# Patient Record
Sex: FEMALE | Race: WHITE | NOT HISPANIC OR LATINO | Employment: OTHER | URBAN - METROPOLITAN AREA
[De-identification: names, ages, dates, MRNs, and addresses within clinical notes are randomized per-mention and may not be internally consistent; named-entity substitution may affect disease eponyms.]

---

## 2017-01-12 ENCOUNTER — DOCUMENTATION ONLY (OUTPATIENT)
Dept: FAMILY MEDICINE | Facility: CLINIC | Age: 67
End: 2017-01-12

## 2017-01-12 NOTE — PROGRESS NOTES
"When opening a documentation only encounter, be sure to enter in \"Chief Complaint\" Forms and in \" Comments\" Title of form, description if needed.    Form received via: Fax  Form now resides in: Provider Ready    Form sent out via: Fax  Patient informed: No  Output date: January 12, 2017'    Form received by recipient via fax confirmation  Please close the encounter.    "

## 2017-06-28 ENCOUNTER — HOSPITAL ENCOUNTER (OUTPATIENT)
Dept: MAMMOGRAPHY | Facility: CLINIC | Age: 67
Discharge: HOME | End: 2017-06-28
Attending: FAMILY MEDICINE | Admitting: FAMILY MEDICINE
Payer: MEDICARE

## 2017-06-28 DIAGNOSIS — Z12.31 VISIT FOR SCREENING MAMMOGRAM: ICD-10-CM

## 2017-06-28 PROCEDURE — 77063 BREAST TOMOSYNTHESIS BI: CPT

## 2018-03-23 ENCOUNTER — TELEPHONE (OUTPATIENT)
Dept: FAMILY MEDICINE | Facility: CLINIC | Age: 68
End: 2018-03-23

## 2018-03-23 DIAGNOSIS — Z71.84 TRAVEL ADVICE ENCOUNTER: Primary | ICD-10-CM

## 2018-03-23 NOTE — TELEPHONE ENCOUNTER
[facility] phone call message- general phone call:    Reason for call: Patient called stating that her partner Jatinder Vaughn has been communicating with Dr. Menchaca regarding getting typhoid medication before they go out of the country. Patient just wants to make sure that Dr. Menchaca will be sending over medication for her as well since they will both be there. Please call back to discuss.    Return call needed: Yes    OK to leave a message on voice mail? Yes    Primary language: English      needed? No    Call taken on March 23, 2018 at 8:23 AM by Aleshia Beasley

## 2018-06-25 ENCOUNTER — HOSPITAL ENCOUNTER (OUTPATIENT)
Dept: MAMMOGRAPHY | Facility: CLINIC | Age: 68
Discharge: HOME | End: 2018-06-25
Attending: FAMILY MEDICINE | Admitting: FAMILY MEDICINE
Payer: MEDICARE

## 2018-06-25 DIAGNOSIS — Z12.31 VISIT FOR SCREENING MAMMOGRAM: ICD-10-CM

## 2018-06-25 PROCEDURE — 77063 BREAST TOMOSYNTHESIS BI: CPT

## 2018-11-28 ENCOUNTER — OFFICE VISIT (OUTPATIENT)
Dept: PHARMACY | Facility: CLINIC | Age: 68
End: 2018-11-28
Payer: MEDICARE

## 2018-11-28 ENCOUNTER — OFFICE VISIT (OUTPATIENT)
Dept: FAMILY MEDICINE | Facility: CLINIC | Age: 68
End: 2018-11-28
Payer: MEDICARE

## 2018-11-28 VITALS
HEART RATE: 62 BPM | OXYGEN SATURATION: 97 % | RESPIRATION RATE: 16 BRPM | BODY MASS INDEX: 23.51 KG/M2 | HEIGHT: 59 IN | TEMPERATURE: 98.6 F | SYSTOLIC BLOOD PRESSURE: 128 MMHG | DIASTOLIC BLOOD PRESSURE: 78 MMHG | WEIGHT: 116.6 LBS

## 2018-11-28 DIAGNOSIS — Z13.220 LIPID SCREENING: ICD-10-CM

## 2018-11-28 DIAGNOSIS — R20.0 NUMBNESS OF RIGHT HAND: ICD-10-CM

## 2018-11-28 DIAGNOSIS — M54.2 NECK PAIN: ICD-10-CM

## 2018-11-28 DIAGNOSIS — Z00.00 PREVENTATIVE HEALTH CARE: Primary | ICD-10-CM

## 2018-11-28 DIAGNOSIS — Z00.00 MEDICARE ANNUAL WELLNESS VISIT, SUBSEQUENT: Primary | ICD-10-CM

## 2018-11-28 DIAGNOSIS — Z11.59 NEED FOR HEPATITIS C SCREENING TEST: ICD-10-CM

## 2018-11-28 LAB
CHOLEST SERPL-MCNC: 185.2 MG/DL (ref 0–200)
CHOLEST/HDLC SERPL: 2 {RATIO} (ref 0–5)
HDLC SERPL-MCNC: 94.5 MG/DL
LDLC SERPL CALC-MCNC: 81 MG/DL (ref 0–129)
TRIGL SERPL-MCNC: 50.4 MG/DL (ref 0–150)
VLDL CHOLESTEROL: 10.1 MG/DL (ref 7–32)

## 2018-11-28 NOTE — PATIENT INSTRUCTIONS
[facility] - SERVICES     Review these tests with your doctor then decide which ones you want and take this page home for your reference  Immunization History   Administered Date(s) Administered     HEPA 05/12/1997, 12/01/1997     HepB 09/30/1997, 06/12/2006, 12/11/2006     Influenza (H1N1) 01/07/2010     Influenza (High Dose) 3 valent vaccine 10/11/2016, 09/30/2018     Influenza (IIV3) PF 11/14/1996, 11/07/1997, 10/17/2006, 11/06/2007, 11/19/2008, 09/29/2011, 09/17/2014     Influenza Vaccine IM 3yrs+ 4 Valent IIV4 10/21/2015     OPV, trivalent, live 08/05/1958, 06/22/1960, 07/27/1966     Small Pox 12/21/1966     TD (ADULT, 7+) 06/22/1960, 06/19/1963, 07/27/1966, 07/19/2002     TDAP Vaccine (Boostrix) 05/07/2013     Tetanus 01/01/1969     Typhoid Oral 07/11/2006, 07/11/2006, 07/13/2006, 07/15/2006, 07/17/2006     Yellow Fever 07/11/2006     Zoster vaccine, live 04/20/2010          IMMUNIZATIONS Description Recommend today?     Influenza (flu shot) Helps to prevent flu; you should get it every year Up to date   PCV 13 Prevents pneumonia; given once Recommeded and patient declined.    PPSV 23 Prevents pneumonia; given once Recommeded and patient declined.    Tetanus Prevents tetanus; need every 10 years No; is up to date.   Herpes Zoster (shingles) Prevents or lessens symptoms from shingles; given once Re[facility] to get at [facility]   Hepatitis B  If you have any of the following risk factors you should be immunized for hepatitis B: severe kidney disease, people who live in the same house as a carrier of Hepatitis B virus, people who live in  institutions (e.g. nursing homes or group homes), homosexual men, patients with hemophilia who received Factor VIII or IX concentrates, abusers of illicit injectable drugs No; is up to date.           Health Maintenance   Topic Date Due     HEPATITIS C SCREENING  06/23/1967     PNEUMOCOCCAL (1 of 2 - PCV13) 06/23/2014     LIPID SCREEN Q5  YR FEMALE (SYSTEM ASSIGNED)  02/28/2017     PHQ-2 Q1 YR  12/09/2017     ADVANCE DIRECTIVE PLANNING Q5 YRS  11/01/2018     FALL RISK ASSESSMENT  11/28/2019     MAMMO SCREEN Q2 YR (SYSTEM ASSIGNED)  06/25/2020     TETANUS IMMUNIZATION (SYSTEM ASSIGNED)  05/07/2023     COLON CANCER SCREEN (SYSTEM ASSIGNED)  12/02/2026     DEXA SCAN SCREENING (SYSTEM ASSIGNED)  Completed     INFLUENZA VACCINE  Completed       SCREENING TESTS     Description   Year of Last Screening   Recommended Today?   Heart disease screening blood tests    Cholesterol level Reducing cholesterol can reduce your risk of heart attacks by 25%.  Screening is recommended yearly if you are at risk of heart disease otherwise every 4-5 years  Yes; Recommended and ordered.   Diabetes screening tests    Hemoglobin A1c blood test   Finding and treating diabetes early can reduce complications.  Screening recommended/covered yearly if you have high blood pressure, high cholesterol, obesity (BMI >30), or a history of high blood glucose tests; or 2 of the following: family history of diabetes, overweight (BMI >25 but <30), age 65 years or older, and a history of diabetes of pregnancy or gave birth to baby weighing more than 9 lbs.  No; is up to date.   Hepatitis B screening Finding hepatitis B early can reduce complications.  Screening is recommended for persons with selected risk factors.  No: is not indicated today.   Hepatitis C screening Finding hepatitis C early can reduce complications.  Screening is recommended for all persons born from 1945 through 1965 and for those with selected other risk factors.   Yes; Recommended and ordered.   HIV screening Finding HIV early can reduce complications.  Screening is recommended for persons with risk factors for HIV infection.  No: is not indicated today.   Glaucoma screening Early detection of glaucoma can prevent blindness.   Please talk to your eye doctor about this.           SCREENING TESTS     Description   Year of  Last Screening   Recommended Today?   Colorectal cancer screening    Fecal occult blood test     Screening colonoscopy Screening for colon cancer has been shown to reduce death from colon cancer by 25-30%. Screening recommended to start at 50 years and continuing until age 75 years.    No; is up to date.   Breast Cancer Screening (women)    Mammogram Mammogram screening for breast cancer has been shown to reduce the risk of dying from breast cancer and prolong life. Screening is recommended every 1-2 years for women aged 50 to 74 years.  2018 No; is up to date.   Cervical Cancer screening (women)    Pap Cervical pap smears can reduce cervical cancer. Screening is recommended annually if high risk (history of abnormal pap smears) otherwise every 2-3 years, stop screening at 65 years of age if history of normal paps.  No; is up to date.   Screening for Osteoporosis:  Bone mass measurements (women)    Dexa Scan Screening and treating Osteoporosis can reduce the risk of hip and spine fractures. Screening is recommended in women 65 years or older and in women and men at risk of osteoporosis.  No: is not indicated today.   Screening for Lung Cancer     Low-dose CT scanning Screening can reduce mortality in persons aged 55-80 who have smoked at least 30 pack-years and who are either still smoking or have quit in the past 15 years.  No: is not indicated today.   Abdominal Aortic Aneurysm (AAA) screening    Ultrasound (US)   An aneurysm treated before rupture is very safe -a ruptured aneurysm can be fatal.  Screening  by US for AAA is limited to patients who meet one of the following criteria:    Men who are 65-75 years old and have smoked more than 100 cigarettes in their lifetime    Anyone with a family history of abdominal aortic aneurysm  No: is not indicated today.       MEDICARE WELLNESS EXAM PATIENT INSTRUCTIONS    Yearly exam:     See your health care provider every year in order to review changes in your health,  review medicines that you take, and discuss preventive care needs such as immunizations and cancer screening.    Get a flu shot each year.     Advance Directives:    If you have not done so, you are encouraged to complete advance directives and/or a living will.   More information about advance directives can be found at: http://www.[facility]med.org/advocacy/Key-Issues/Advance-Directives    Nutrition:     Eat at least 5 servings of fruits and vegetables each day.     Eat whole-grain bread, whole-wheat pasta and brown rice instead of white grains and rice.     Talk to your doctor about Calcium and Vitamin D.     Lifestyle:    Exercise for at least 150 minutes a week (30 minutes a day, 5 days a week). This will help you control your weight and prevent disease.     Limit alcohol to one drink per day.     If you smoke, try to quit - your doctor will be happy to help.     Wear sunscreen to prevent skin cancer.     See your dentist every six months for an exam and cleaning.     See your eye doctor every 1 to 2 years to screen for conditions such as glaucoma, macular degeneration and cataracts.

## 2018-11-28 NOTE — PROGRESS NOTES
Clinical Pharmacy Note     Carli was referred by Dr. Menchaca for pharmacy services for annual wellness medication review.     MEDICATION REVIEW:  Discussed all medication indications, dosage and effectiveness, adverse effects, and adherence with patient/caregiver.    Pt had meds with them: no  Pt had med list with them: no  Pt was knowledgeable about meds: yes  Medications set up by: self  Medications administered by someone else (e.g., [facility]): No  Pt uses a medication box or automated dispenser: no  Called pharmacy to obtain or clarify med list:  no  Called [facility] or [facility] to obtain or clarify med list:  no    Medication Discrepancies  Medications on EMR med list that pt is NOT taking:  triamcinolone ointment, ketoconazole cream, typhoid capsules  Medications pt IS taking that are NOT on EMR med list (e.g., from specialist, hospital): yes, Preservision ARED2  OTC meds/ dietary supplements pt taking on own that are NOT on EMR med list:  none  Dosage listed differently than how patient is taking: none  Frequency listed differently than how patient is taking: none  Duplicate medication on list (two occurrences of the same medication):  none  TOTAL NUMBER OF MEDICATION DISCREPANCIES:  2  ______________________________________________________________________      Subjective:  Carli is here today for an annual wellness visit. She has not been using her Premarin cream for a couple months. She denies side effects from any of her medications and is interested in receiving a Shingrix vaccine when it becomes available. She has no other concerns today.      Patient reported being compliant all of the time   Patient reports no side effects.    Objective:  There were no vitals taken for this visit.  BP Readings from Last 6 Encounters:   11/28/18 128/78   11/16/16 145/77   07/21/15 147/78   12/12/14 128/75   05/07/13 131/78   10/04/11 121/68     CrCl cannot be calculated (No order found.).  No results found for: GFRESTIMATED  No  results found for: GFRESTBLACK  Immunization History   Administered Date(s) Administered     HEPA 05/12/1997, 12/01/1997     HepB 09/30/1997, 06/12/2006, 12/11/2006     Influenza (H1N1) 01/07/2010     Influenza (High Dose) 3 valent vaccine 10/11/2016, 09/30/2018     Influenza (IIV3) PF 11/14/1996, 11/07/1997, 10/17/2006, 11/06/2007, 11/19/2008, 09/29/2011, 09/17/2014     Influenza Vaccine IM 3yrs+ 4 Valent IIV4 10/21/2015     OPV, trivalent, live 08/05/1958, 06/22/1960, 07/27/1966     Small Pox 12/21/1966     TD (ADULT, 7+) 06/22/1960, 06/19/1963, 07/27/1966, 07/19/2002     TDAP Vaccine (Boostrix) 05/07/2013     Tetanus 01/01/1969     Typhoid Oral 07/11/2006, 07/11/2006, 07/13/2006, 07/15/2006, 07/17/2006     Yellow Fever 07/11/2006     Zoster vaccine, live 04/20/2010     Patient Active Problem List   Diagnosis     Onychomycosis     Pain of left thumb     SOCIAL HISTORY:   reports that she has never smoked. She has never used smokeless tobacco. She reports that she drinks about 3.5 - 4.5 oz of alcohol per week  She reports that she does not use illicit drugs.    Current Outpatient Prescriptions   Medication Sig Dispense Refill     calcium carbonate (TUMS) 500 MG chewable tablet Take 1 chew tab by mouth daily       Multiple Vitamins-Minerals (MULTIVITAMIN WOMEN 50+ PO) Take 1 tablet by mouth daily       Multiple Vitamins-Minerals (PRESERVISION AREDS 2 PO) Take 1 capsule by mouth daily       conjugated estrogens (PREMARIN) vaginal cream Place 1 g vaginally once a week (Patient not taking: Reported on 11/28/2018) 30 g 3     Allergies   Allergen Reactions     Sulfa Drugs      Environmental factors that may impact patient: none.  There were no vitals taken for this visit.    Drug Therapy Assessment:  Drug therapy problems identified:     Preventative Health  Status- uncontrolled  DTP- needs additional preventative vaccine.     Carli inquired about receiving a Shingrix vaccine. This vaccine is on national shortage. It is  sporadically available in most retail settings. The shortage is estimated to resolve around the first of the year. She is indicated and has no contraindications to the vaccine. Carli should receive Shingrix if desired. It is a two dose series given day 0 and the second dose given 2-6 months later.     All medications were reviewed and found to be indicated, effective, safe and convenient unless drug therapy problem identified as described above.      Drug Therapy Plan and Follow up:    Plan  - Around the first of the new year ask local pharmacy about Shingrix vaccine and get on waiting list.     Follow up: with PharmD as requested by patient or provider     Options for treatment and/or follow-up care were reviewed with the patient. Patient was engaged and actively involved in the decision making process.  Carli STEPHENS Christakatelynn verbalized understanding of the options discussed and was satisfied with the final plan.      Dr. Menchaca was provided my action  in clinic today and was available for supervision during this visit and is the authorizing prescriber for this visit through the pharmacist collaborative practice agreement.    PURNIMA Vitale Pharm.D. IV Student     ===============    I was present with the pharmacy student who participated in the service and in the documentation of this note. I have verified the history, personally performed the medical decision making, and have verified the content of the note, which accurately reflects my assessment of the patient and the plan of care.  Clara Webb, PharmD    =================    Total Time: 15  # DTPs Identified: 0

## 2018-11-28 NOTE — PROGRESS NOTES
I have verified the content of the note, which accurately reflects my assessment of the patient and the plan of care.   Tray Kitchen, PharmD

## 2018-11-28 NOTE — MR AVS SNAPSHOT
After Visit Summary   11/28/2018    Carli Michelle    MRN: [id number]           Patient Information     Date Of Birth          1950        Visit Information        Provider Department      11/28/2018 1:40 PM Houston Menchaca MD [facility]        Today's Diagnoses     Medicare annual wellness visit, subsequent    -  1    Lipid screening        Need for hepatitis C screening test        Neck pain        Numbness of right hand          Care Instructions    [facility] PERSONAL PREVENTIVE SERVICES PLAN - SERVICES     Review these tests with your doctor then decide which ones you want and take this page home for your reference  Immunization History   Administered Date(s) Administered     HEPA 05/12/1997, 12/01/1997     HepB 09/30/1997, 06/12/2006, 12/11/2006     Influenza (H1N1) 01/07/2010     Influenza (High Dose) 3 valent vaccine 10/11/2016, 09/30/2018     Influenza (IIV3) PF 11/14/1996, 11/07/1997, 10/17/2006, 11/06/2007, 11/19/2008, 09/29/2011, 09/17/2014     Influenza Vaccine IM 3yrs+ 4 Valent IIV4 10/21/2015     OPV, trivalent, live 08/05/1958, 06/22/1960, 07/27/1966     Small Pox 12/21/1966     TD (ADULT, 7+) 06/22/1960, 06/19/1963, 07/27/1966, 07/19/2002     TDAP Vaccine (Boostrix) 05/07/2013     Tetanus 01/01/1969     Typhoid Oral 07/11/2006, 07/11/2006, 07/13/2006, 07/15/2006, 07/17/2006     Yellow Fever 07/11/2006     Zoster vaccine, live 04/20/2010          IMMUNIZATIONS Description Recommend today?     Influenza (flu shot) Helps to prevent flu; you should get it every year Up to date   PCV 13 Prevents pneumonia; given once Recommeded and patient declined.    PPSV 23 Prevents pneumonia; given once Recommeded and patient declined.    Tetanus Prevents tetanus; need every 10 years No; is up to date.   Herpes Zoster (shingles) Prevents or lessens symptoms from shingles; given once Recommended to get at [facility]   Hepatitis B  If you have any of the following  risk factors you should be immunized for hepatitis B: severe kidney disease, people who live in the same house as a carrier of Hepatitis B virus, people who live in  institutions (e.g. nursing homes or group homes), homosexual men, patients with hemophilia who received Factor VIII or IX concentrates, abusers of illicit injectable drugs No; is up to date.           Health Maintenance   Topic Date Due     HEPATITIS C SCREENING  06/23/1967     PNEUMOCOCCAL (1 of 2 - PCV13) 06/23/2014     LIPID SCREEN Q5 YR FEMALE (SYSTEM ASSIGNED)  02/28/2017     PHQ-2 Q1 YR  12/09/2017     ADVANCE DIRECTIVE PLANNING Q5 YRS  11/01/2018     FALL RISK ASSESSMENT  11/28/2019     MAMMO SCREEN Q2 YR (SYSTEM ASSIGNED)  06/25/2020     TETANUS IMMUNIZATION (SYSTEM ASSIGNED)  05/07/2023     COLON CANCER SCREEN (SYSTEM ASSIGNED)  12/02/2026     DEXA SCAN SCREENING (SYSTEM ASSIGNED)  Completed     INFLUENZA VACCINE  Completed       SCREENING TESTS     Description   Year of Last Screening   Recommended Today?   Heart disease screening blood tests    Cholesterol level Reducing cholesterol can reduce your risk of heart attacks by 25%.  Screening is recommended yearly if you are at risk of heart disease otherwise every 4-5 years  Yes; Recommended and ordered.   Diabetes screening tests    Hemoglobin A1c blood test   Finding and treating diabetes early can reduce complications.  Screening recommended/covered yearly if you have high blood pressure, high cholesterol, obesity (BMI >30), or a history of high blood glucose tests; or 2 of the following: family history of diabetes, overweight (BMI >25 but <30), age 65 years or older, and a history of diabetes of pregnancy or gave birth to baby weighing more than 9 lbs.  No; is up to date.   Hepatitis B screening Finding hepatitis B early can reduce complications.  Screening is recommended for persons with selected risk factors.  No: is not indicated today.   Hepatitis C screening Finding hepatitis C early  can reduce complications.  Screening is recommended for all persons born from 1945 through 1965 and for those with selected other risk factors.   Yes; Recommended and ordered.   HIV screening Finding HIV early can reduce complications.  Screening is recommended for persons with risk factors for HIV infection.  No: is not indicated today.   Glaucoma screening Early detection of glaucoma can prevent blindness.   Please talk to your eye doctor about this.           SCREENING TESTS     Description   Year of Last Screening   Recommended Today?   Colorectal cancer screening    Fecal occult blood test     Screening colonoscopy Screening for colon cancer has been shown to reduce death from colon cancer by 25-30%. Screening recommended to start at 50 years and continuing until age 75 years.    No; is up to date.   Breast Cancer Screening (women)    Mammogram Mammogram screening for breast cancer has been shown to reduce the risk of dying from breast cancer and prolong life. Screening is recommended every 1-2 years for women aged 50 to 74 years.  2018 No; is up to date.   Cervical Cancer screening (women)    Pap Cervical pap smears can reduce cervical cancer. Screening is recommended annually if high risk (history of abnormal pap smears) otherwise every 2-3 years, stop screening at 65 years of age if history of normal paps.  No; is up to date.   Screening for Osteoporosis:  Bone mass measurements (women)    Dexa Scan Screening and treating Osteoporosis can reduce the risk of hip and spine fractures. Screening is recommended in women 65 years or older and in women and men at risk of osteoporosis.  No: is not indicated today.   Screening for Lung Cancer     Low-dose CT scanning Screening can reduce mortality in persons aged 55-80 who have smoked at least 30 pack-years and who are either still smoking or have quit in the past 15 years.  No: is not indicated today.   Abdominal Aortic Aneurysm (AAA) screening    Ultrasound (US)    An aneurysm treated before rupture is very safe -a ruptured aneurysm can be fatal.  Screening  by US for AAA is limited to patients who meet one of the following criteria:    Men who are 65-75 years old and have smoked more than 100 cigarettes in their lifetime    Anyone with a family history of abdominal aortic aneurysm  No: is not indicated today.       MEDICARE WELLNESS EXAM PATIENT INSTRUCTIONS    Yearly exam:     See your health care provider every year in order to review changes in your health, review medicines that you take, and discuss preventive care needs such as immunizations and cancer screening.    Get a flu shot each year.     Advance Directives:    If you have not done so, you are encouraged to complete advance directives and/or a living will.   More information about advance directives can be found at: http://www.[place]med.org/advocacy/Key-Issues/Advance-Directives    Nutrition:     Eat at least 5 servings of fruits and vegetables each day.     Eat whole-grain bread, whole-wheat pasta and brown rice instead of white grains and rice.     Talk to your doctor about Calcium and Vitamin D.     Lifestyle:    Exercise for at least 150 minutes a week (30 minutes a day, 5 days a week). This will help you control your weight and prevent disease.     Limit alcohol to one drink per day.     If you smoke, try to quit - your doctor will be happy to help.     Wear sunscreen to prevent skin cancer.     See your dentist every six months for an exam and cleaning.     See your eye doctor every 1 to 2 years to screen for conditions such as glaucoma, macular degeneration and cataracts.                Follow-ups after your visit        Who to contact     Please call your clinic at [phone] to:    Ask questions about your health    Make or cancel appointments    Discuss your medicines    Learn about your test results    Speak to your doctor            Additional Information About Your Visit        MyChart Information      "[employer] gives you secure access to your electronic health record. If you see a primary care provider, you can also send messages to your care team and make appointments. If you have questions, please call your primary care clinic.  If you do not have a primary care provider, please call [phone] and they will assist you.      [employer] is an electronic gateway that provides easy, online access to your medical records. With [employer], you can request a clinic appointment, read your test results, renew a prescription or communicate with your care team.     To access your existing account, please contact your [facility] or call [phone] for assistance.        Care EveryWhere ID     This is your Care EveryWhere ID. This could be used by other organizations to access your [place] medical records  [id number]        Your Vitals Were     Pulse Temperature Respirations Height Pulse Oximetry BMI (Body Mass Index)    62 98.6  F (37  C) (Oral) 16 4' 11.41\" (150.9 cm) 97% 23.23 kg/m2       Blood Pressure from Last 3 Encounters:   11/28/18 128/78   11/16/16 145/77   07/21/15 147/78    Weight from Last 3 Encounters:   11/28/18 116 lb 9.6 oz (52.9 kg)   11/16/16 119 lb (54 kg)   07/21/15 117 lb 12.8 oz (53.4 kg)              We Performed the Following     Full Code     Hepatitis C Screen Reflex to HCV RNA Quant and Genotype     Lipid Cascade ([facility])          Today's Medication Changes          These changes are accurate as of 11/28/18  4:28 PM.  If you have any questions, ask your nurse or doctor.               These medicines have changed or have updated prescriptions.        Dose/Directions    * MULTIVITAMIN WOMEN 50+ PO   This may have changed:  Another medication with the same name was removed. Continue taking this medication, and follow the directions you see here.   Changed by:  Clara Webb [facility]        Dose:  1 tablet   Take 1 tablet by mouth daily   Refills:  0       * PRESERVISION " AREDS 2 PO   This may have changed:  Another medication with the same name was removed. Continue taking this medication, and follow the directions you see here.   Changed by:  Clara Webb RPH        Dose:  1 capsule   Take 1 capsule by mouth daily   Refills:  0       * Notice:  This list has 2 medication(s) that are the same as other medications prescribed for you. Read the directions carefully, and ask your doctor or other care provider to review them with you.      Stop taking these medicines if you haven't already. Please contact your care team if you have questions.     ketoconazole 2 % external cream   Commonly known as:  NIZORAL   Stopped by:  Clara Webb RPH           triamcinolone 0.1 % external ointment   Commonly known as:  KENALOG   Stopped by:  Clara Webb RPH           typhoid CR capsule   Commonly known as:  VIVOTIF   Stopped by:  Clara Webb RPH                    Primary Care Provider Office Phone # Fax #    Houston Menchaac [phone]       [address] [address]        Equal Access to Services     [facility]: Hadii aad ku hadasho Soomaali, waaxda luqadaha, qaybta kaalmada adeegyada, waxay joanain hayluan prabhakar . So [facility] [phone].    ATENCIÓN: Si habla español, tiene a ca disposición servicios gratuitos de asistencia lingüística. Llame al [phone].    We comply with applicable federal civil rights laws and [place] laws. We do not discriminate on the basis of race, color, national origin, age, disability, sex, sexual orientation, or gender identity.            Thank you!     Thank you for choosing [facility]  for your care. Our goal is always to provide you with excellent care. Hearing back from our patients is one way we can continue to improve our services. Please take a few minutes to complete the written survey that you may receive in the mail after your visit with us. Thank you!              Your Updated Medication List - Protect others around you: Learn how to safely use, store and throw away your medicines at www.disposemymeds.org.          This list is accurate as of 11/28/18  4:28 PM.  Always use your most recent med list.                   Brand Name Dispense Instructions for use Diagnosis    calcium carbonate 500 MG chewable tablet    TUMS     Take 1 chew tab by mouth daily        conjugated estrogens 0.625 MG/GM vaginal cream    PREMARIN    30 g    Place 1 g vaginally once a week    Atrophic vaginitis       * MULTIVITAMIN WOMEN 50+ PO      Take 1 tablet by mouth daily        * PRESERVISION AREDS 2 PO      Take 1 capsule by mouth daily        * Notice:  This list has 2 medication(s) that are the same as other medications prescribed for you. Read the directions carefully, and ask your doctor or other care provider to review them with you.

## 2018-11-28 NOTE — MR AVS SNAPSHOT
After Visit Summary   11/28/2018    Carli Michelle    MRN: [id number]           Patient Information     Date Of Birth          1950        Visit Information        Provider Department      11/28/2018 1:20 PM Clara Webb [facility]        Today's Diagnoses     Preventative health care    -  1       Follow-ups after your visit        Who to contact     Please call your clinic at [phone] to:    Ask questions about your health    Make or cancel appointments    Discuss your medicines    Learn about your test results    Speak to your doctor            Additional Information About Your Visit        MyChart Information     [employer] gives you secure access to your electronic health record. If you see a primary care provider, you can also send messages to your care team and make appointments. If you have questions, please call your primary care clinic.  If you do not have a primary care provider, please call [phone] and they will assist you.      [employer] is an electronic gateway that provides easy, online access to your medical records. With [employer], you can request a clinic appointment, read your test results, renew a prescription or communicate with your care team.     To access your existing account, please contact your [facility] or call [phone] for assistance.        Care EveryWhere ID     This is your Care EveryWhere ID. This could be used by other organizations to access your [place] medical records  [id number]         Blood Pressure from Last 3 Encounters:   11/28/18 128/78   11/16/16 145/77   07/21/15 147/78    Weight from Last 3 Encounters:   11/28/18 116 lb 9.6 oz (52.9 kg)   11/16/16 119 lb (54 kg)   07/21/15 117 lb 12.8 oz (53.4 kg)              Today, you had the following     No orders found for display         Today's Medication Changes          These changes are accurate as of 11/28/18 11:59 PM.  If you have  any questions, ask your nurse or doctor.               These medicines have changed or have updated prescriptions.        Dose/Directions    * MULTIVITAMIN WOMEN 50+ PO   This may have changed:  Another medication with the same name was removed. Continue taking this medication, and follow the directions you see here.   Changed by:  Clara Webb RPH        Dose:  1 tablet   Take 1 tablet by mouth daily   Refills:  0       * PRESERVISION AREDS 2 PO   This may have changed:  Another medication with the same name was removed. Continue taking this medication, and follow the directions you see here.   Changed by:  Clara Webb RPH        Dose:  1 capsule   Take 1 capsule by mouth daily   Refills:  0       * Notice:  This list has 2 medication(s) that are the same as other medications prescribed for you. Read the directions carefully, and ask your doctor or other care provider to review them with you.      Stop taking these medicines if you haven't already. Please contact your care team if you have questions.     ketoconazole 2 % external cream   Commonly known as:  NIZORAL   Stopped by:  Clara Webb RPH           triamcinolone 0.1 % external ointment   Commonly known as:  KENALOG   Stopped by:  Clara Webb RPH           typhoid CR capsule   Commonly known as:  VIVOTIF   Stopped by:  Clara Webb RPH                    Primary Care Provider Office Phone # Fax #    Houston Menchaca [phone]       [address] [address]        Equal Access to Services     [facility]: Hadii dalia anderson Soamalia, waaxda luqadaha, qaybta kaalmada jade, birgit prabhakar . So [facility] [phone].    ATENCIÓN: Si habla español, tiene a ca disposición servicios gratuitos de asistencia lingüística. Llame al [phone].    We comply with applicable federal civil rights laws and [place] laws. We do not discriminate on the basis of race, color, national  origin, age, disability, sex, sexual orientation, or gender identity.            Thank you!     Thank you for choosing [facility]  for your care. Our goal is always to provide you with excellent care. Hearing back from our patients is one way we can continue to improve our services. Please take a few minutes to complete the written survey that you may receive in the mail after your visit with us. Thank you!             Your Updated Medication List - Protect others around you: Learn how to safely use, store and throw away your medicines at www.disposemymeds.org.          This list is accurate as of 11/28/18 11:59 PM.  Always use your most recent med list.                   Brand Name Dispense Instructions for use Diagnosis    calcium carbonate 500 MG chewable tablet    TUMS     Take 1 chew tab by mouth daily        conjugated estrogens 0.625 MG/GM vaginal cream    PREMARIN    30 g    Place 1 g vaginally once a week    Atrophic vaginitis       * MULTIVITAMIN WOMEN 50+ PO      Take 1 tablet by mouth daily        * PRESERVISION AREDS 2 PO      Take 1 capsule by mouth daily        * Notice:  This list has 2 medication(s) that are the same as other medications prescribed for you. Read the directions carefully, and ask your doctor or other care provider to review them with you.

## 2018-11-28 NOTE — PROGRESS NOTES
>64 year old Wellness Visit ( Medicare etc)           HPI       This 68 year old female presents as an established patient  Houston Menchaca who presents for a  Annual Wellness Exam.    Other issues patient wants to address today:    yes, right shoulder pain and right hand numbness   Right handed -happens while on the treadmill, holds bar, happens when reading in bed, also reports some pain in the right neck and shoulder area.  No change in range of motion no pain with movement of shoulder.  Denies change in strength denies significant overuse though did report that this occurred after some leaflet blowing.    Some hearing loss though not enough to to get hearing aids.      Visual Acuity: :  Testing not done; patient has seen eye doctor in the past 12 months.    Patient Active Problem List   Diagnosis     Onychomycosis     Pain of left thumb       Past Medical History:   Diagnosis Date     Atrophic vaginitis         Family History   Problem Relation Age of Onset     Breast Cancer Mother 40     Glaucoma Mother      Alzheimer Disease Father 82     Diabetes Father      Cancer Maternal Grandmother 68     unknown cancer     Breast Cancer Maternal Aunt      Glaucoma Maternal Aunt          Problem List, Family History and past Medical History reviewed and unchanged/updated.       Health risk Assessment/ Review of Systems:         Constitutional:   Fevers or night sweats?  NO      Eyes:   Vision problems?  NO            Hearing:  Do you feel you have hearing loss?  Yes, some TV shows she feels like they are mumbling per pt  Cardiovascular:  Chest pain, palpitations, or pain with walking?  NO        Respiratory:   Breathing problems or cough?  NO    :   Difficulty controlling urination?  NO        Musculoskeletal:   Stiffness or sore joints?  Yes, right shoulder soreness for 6 weeks        Skin:   concerning lesions or moles?  NO           Nervous System:   loss of strength or sensation,  numbness or tingling,  tremor,   dizziness,  headache?  Yes, right hand numbness for years    Mental Health:   depression or anxiety,  sleep problems?  Yes, disrupted sleep for many years. Sleep is worsening  Cognition:  Memory problems?  NO       Weight Loss: Have you lost 10 or more pounds unintentionally in the previous year?  NO  Energy: How much of the time during the past 3 weeks did you feel tired?   (check one of the following):   ?  All of the time  ? Most of the time  ? Some of the time  X A little of the time  ? None of the Time    PHQ-2 Score:   PHQ-2 ( 1999 Pfizer) 11/16/2016 11/16/2016   Q1: Little interest or pleasure in doing things 0 0   Q2: Feeling down, depressed or hopeless 0 0   PHQ-2 Score 0 0       PHQ-9 Score:   [facility] Follow[facility] to [facility] 12/9/2016   PHQ-9 9. Suicide Ideation past 2 weeks Not at all     Medical Care:     What other specialists or organizations are involved in your medical care?  None   Patient Care Team       Relationship Specialty Notifications Start End    Houston Menchaca MD PCP - General Family Practice  4/8/11     Phone: [phone] Fax: [phone]         [address] [place] [address]    Houston Menchaca MD MD Family Practice  11/18/16     Comment:  referring to [facility]    Phone: [phone] Fax: [phone]         [address] [place] [address]    Frank Diamond MD MD Colon and Rectal Surgery  11/18/16     Phone: [phone] Fax: [phone]         [address] [place] [id number]              Social History / Home Safety     Social History   Substance Use Topics     Smoking status: Never Smoker     Smokeless tobacco: Not on file     Alcohol use 3.5 - 4.5 oz/week     7 - 9 drink(s) per week     Marital Status:  Who lives in your household?   Does your home have any of the following safety concerns? Loose rugs in the hallway, no grab bars in the bathroom, no handrails on the stairs or have poorly lit areas?  No   Do you feel  threatened or controlled by a partner, ex-partner or anyone in your life? {Yes No   Has anyone hurt you physically, for example by pushing, hitting, slapping or kicking you   or forcing you to have sex? No       Functional Status     Do you need help with dressing yourself, bathing, or walking?No   Do you need help with the phone, transportation, shopping, preparing meals, housework, laundry, medications or managing money?No   By yourself and not using aids, do you have any difficulty walking up 10 steps  without resting? No   By yourself and not using aids, do you have any difficulty walking several hundred yards? No              Risk Behaviors and Healthy Habits     History   Smoking Status     Never Smoker   Smokeless Tobacco     Not on file     How many servings of fruits and vegetables do you eat a day? 1-2  Exercise:walking  and yoga/stretching 3 days a week  Do you frequently drive without a seatbelt? No   History   Smoking Status     Never Smoker   Smokeless Tobacco     Not on file     Do you use any other drugs? No       Do you use alcohol?Yes, 1-2 per day 7 days a week        Functional Ability   Was the patient's timed Up & Go test (Get up from chair walk, 10 feet turn, return to chair and sit down) unsteady or longer than 30 seconds? No     Fall risk:     1. Have you fallen two or more times in the past year? No   2. Have you fallen and had an injury in the past year?  No         Evaulation of Cognitive Function     Family member/caregiver input: Normal    1) Repeat 3 items (Leader, Season, Table)    2) Clock draw: Normal  3) 3 item recall: Recalls 2 objects   Results: NORMAL clock, 1-2 items recalled: COGNITIVE IMPAIRMENT LESS LIKELY    Mini-CogTM Copyright S Rodríguez. Licensed by the author for use in [facility]; reprinted with permission (radha@.[email or web address]). All rights reserved.            Other Assessments:     CV Risk based on Pooled Cohort Risk (consider assessing every 4-6 years; consider  "statin in patients with 10-yr risk > 7.5%):   The 10-year ASCVD risk score ([place] URRUTIA Jr, et al., 2013) is: 7.3%    Values used to calculate the score:      Age: 68 years      Sex: Female      Is Non- : No      Diabetic: No      Tobacco smoker: No      Systolic Blood Pressure: 128 mmHg      Is BP treated: No      HDL Cholesterol: 94.5 mg/dL      Total Cholesterol: 185.2 mg/dL    Advance Directives: Discussed with patient and family as appropriate.  Has patient completed advance directives and/or a living will?  yes     Immunization History   Administered Date(s) Administered     HEPA 05/12/1997, 12/01/1997     HepB 09/30/1997, 06/12/2006, 12/11/2006     Influenza (H1N1) 01/07/2010     Influenza (High Dose) 3 valent vaccine 10/11/2016, 09/30/2018     Influenza (IIV3) PF 11/14/1996, 11/07/1997, 10/17/2006, 11/06/2007, 11/19/2008, 09/29/2011, 09/17/2014     Influenza Vaccine IM 3yrs+ 4 Valent IIV4 10/21/2015     OPV, trivalent, live 08/05/1958, 06/22/1960, 07/27/1966     Small Pox 12/21/1966     TD (ADULT, 7+) 06/22/1960, 06/19/1963, 07/27/1966, 07/19/2002     TDAP Vaccine (Boostrix) 05/07/2013     Tetanus 01/01/1969     Typhoid Oral 07/11/2006, 07/11/2006, 07/13/2006, 07/15/2006, 07/17/2006     Yellow Fever 07/11/2006     Zoster vaccine, live 04/20/2010     Reviewed Immunization Record Today    Physical Exam     Vitals: /78  Pulse 62  Temp 98.6  F (37  C) (Oral)  Resp 16  Ht 4' 11.41\" (150.9 cm)  Wt 116 lb 9.6 oz (52.9 kg)  SpO2 97%  BMI 23.23 kg/m2  BMI= Body mass index is 23.23 kg/(m^2).  GENERAL APPEARANCE: alert and no distress  HENT: ear canals patent and TM's normal; mouth without ulcers or lesions; and oral mucous membranes moist  Hearing loss screen:   Normal   DENTITION:  Good repair?  yes  RESP: lungs clear to auscultation - no rales, rhonchi or wheezes  CV: regular rates and rhythm, no murmur, click or rub, no peripheral edema and peripheral pulses strong  SKIN: no " suspicious lesions or rashes  NEURO: Normal strength and tone  MENTAL STATUS EXAM  Appearance: appropriate  Attitude: cooperative  Behavior: normal  Eye Contact: normal  Speech: normal  Orientation: oreinted to person , place, time and situation  Mood: Euthymic.  Affect: Mood Congruent  Musculoskeletal exam: Left shoulder and arm normal.  right trapezius and neck muscles showed mild spasm.  There is some increase in numbness with tests for thoracic outlet syndrome on the right.  Thought Process: clear        Assessment and Plan         Welcome to Medicare Preventive Visit / Initial Medicare Annual Wellness Exam - reviewed Preventive Services and Plan form with patient as specified in Patient Instructions.    1. Medicare annual wellness visit, subsequent  Reviewed required elements.    2. Lipid screening  We will communicate results by my chart.  - Lipid Cascade ([place])    3. Need for hepatitis C screening test  We will communicate results by my chart  - Hepatitis C Screen Reflex to HCV RNA Quant and Genotype    4. Neck pain  Differential diagnosis includes overuse syndrome neck sprain or possible nerve compression in in in the neck.  Exam shows there is no change in sensation neurologic function will continue to observe advised neck relaxation exercises and strengthening exercises follow-up via my chart in 1 month if still concerned or sooner if worse.    5. Numbness of right hand  Differential diagnosis includes carpal tunnel syndrome overuse syndrome possible nerve compression in the neck in the cervical neck.  Will continue with neck exercises and follow-up if not improved in 1 month or sooner if worse.    Options for treatment and follow-up care were reviewed with the Carli Michelle and/or guardian engaged in the decision making process and verbalized understanding of the options discussed and agreed with the final plan.    Houston Menchaca MD

## 2018-11-29 LAB — HCV AB SERPL QL IA: NONREACTIVE

## 2019-05-27 ENCOUNTER — MYC MEDICAL ADVICE (OUTPATIENT)
Dept: FAMILY MEDICINE | Facility: CLINIC | Age: 69
End: 2019-05-27
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
## 2019-05-28 RX ORDER — DOXYCYCLINE 100 MG/1
200 CAPSULE ORAL ONCE
Qty: 2 CAPSULE | Refills: 0 | Status: SHIPPED | OUTPATIENT
Start: 2019-05-28 | End: 2019-06-13

## 2019-06-13 ENCOUNTER — MYC MEDICAL ADVICE (OUTPATIENT)
Dept: FAMILY MEDICINE | Facility: CLINIC | Age: 69
End: 2019-06-13
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
## 2019-06-13 RX ORDER — DOXYCYCLINE 100 MG/1
200 CAPSULE ORAL ONCE
Qty: 2 CAPSULE | Refills: 0 | Status: SHIPPED | OUTPATIENT
Start: 2019-06-13 | End: 2019-06-13

## 2019-06-13 NOTE — TELEPHONE ENCOUNTER

## 2019-06-26 ENCOUNTER — HOSPITAL ENCOUNTER (OUTPATIENT)
Dept: MAMMOGRAPHY | Facility: CLINIC | Age: 69
Discharge: HOME | End: 2019-06-26
Attending: FAMILY MEDICINE | Admitting: FAMILY MEDICINE
Payer: MEDICARE

## 2019-06-26 DIAGNOSIS — Z12.31 VISIT FOR SCREENING MAMMOGRAM: ICD-10-CM

## 2019-06-26 PROCEDURE — 77063 BREAST TOMOSYNTHESIS BI: CPT

## 2019-10-02 ENCOUNTER — HEALTH MAINTENANCE LETTER (OUTPATIENT)
Age: 69
End: 2019-10-02

## 2020-01-30 ENCOUNTER — OFFICE VISIT (OUTPATIENT)
Dept: FAMILY MEDICINE | Facility: CLINIC | Age: 70
End: 2020-01-30
Payer: MEDICARE

## 2020-01-30 VITALS
TEMPERATURE: 98.4 F | WEIGHT: 118 LBS | BODY MASS INDEX: 23.79 KG/M2 | DIASTOLIC BLOOD PRESSURE: 78 MMHG | HEIGHT: 59 IN | SYSTOLIC BLOOD PRESSURE: 138 MMHG

## 2020-01-30 DIAGNOSIS — H91.90 HEARING LOSS, UNSPECIFIED HEARING LOSS TYPE, UNSPECIFIED LATERALITY: ICD-10-CM

## 2020-01-30 DIAGNOSIS — M75.41 IMPINGEMENT SYNDROME OF SHOULDER REGION, RIGHT: ICD-10-CM

## 2020-01-30 DIAGNOSIS — Z71.89 ADVANCED DIRECTIVES, COUNSELING/DISCUSSION: ICD-10-CM

## 2020-01-30 DIAGNOSIS — Z23 IMMUNIZATION DUE: ICD-10-CM

## 2020-01-30 DIAGNOSIS — Z00.00 MEDICARE ANNUAL WELLNESS VISIT, SUBSEQUENT: Primary | ICD-10-CM

## 2020-01-30 ASSESSMENT — MIFFLIN-ST. JEOR: SCORE: 960.87

## 2020-01-30 NOTE — PROGRESS NOTES
>64 year old Wellness Visit ( Medicare etc)         HPI       This 69 year old female presents as an established patient  Houston Menchaca who presents for a  Annual Wellness Exam.    Other issues patient wants to address today: Yes    1. Hearing Concerns  For the past one year, Carli has noticed gradually worsening hearing in both ears. This is most evident when she is talking to her , who frequently mumbles, or watching TV. She has not been to an audiologist in over 20 years but would like a referral so she can get a baseline hearing assessment.     2. Memory Concerns  Carli and her  both have parents who had Alzheimer's dementia. Carli's dad had symptoms in his late 60s and had a very slow progression. Since her last visit one year ago, Carli feels she has greater difficulty finding words. She occasionally sets her coffee cup down and loses it, which has been happening the past several decades, but is happening more frequently lately. She has decided not to do their taxes this year because she is worried about making a mistake.     3. Numbness in R hand  Over the past several years, Carli has experienced numbness in her R hand with certain activities, namely walking on the treadmill and holding her book or newspaper up to the light while she lays in bed. She reports that it feels like her hand falls asleep, and occasionally but not always the numbness is also present in her forearm. The numbness occurs daily with reading, but only occasionally on the treadmill. It is acute-onset after about 5 minutes of the activity, and the numbness resolves on its own when she changes her arm position within a few minutes. She maldonado snot experience shoulder pain, neck pain, weakness, or any other symptoms with this numbness.     4. Cough  Longstanding history of post-nasal drip associated with a constant sensation of needing to clear her throat and a dry cough. Carli reports she has been noticing this  cough/needing to clear her throat more since her last visit a year ago. Her cough does not produce any sputum and has not tried any interventions for the cough.     Visual Acuity: :  Testing not done; patient has seen eye doctor in the past 12 months (and visit planned in February).    Patient Active Problem List   Diagnosis     Onychomycosis     Pain of left thumb       Past Medical History:   Diagnosis Date     Atrophic vaginitis         Family History   Problem Relation Age of Onset     Breast Cancer Mother 40     Glaucoma Mother      Alzheimer Disease Father 82     Diabetes Father      Cancer Maternal Grandmother 68        unknown cancer     Breast Cancer Maternal Aunt      Glaucoma Maternal Aunt          Problem List, Family History and past Medical History reviewed and unchanged/updated.       Health risk Assessment/ Review of Systems:       Constitutional:   Fevers or night sweats?  NO      Eyes:   Vision problems?  NO            Hearing:  Do you feel you have hearing loss?  NO  Cardiovascular:  Chest pain, palpitations, or pain with walking?  NO        Respiratory:   Breathing problems or cough?  NO    :   Difficulty controlling urination?  NO        Musculoskeletal:   Stiffness or sore joints?  NO            Skin:   concerning lesions or moles?  NO           Nervous System:   loss of strength or sensation,  numbness or tingling,  tremor,  dizziness,  headache?  NO         Mental Health:   depression or anxiety,  sleep problems?  NO   Cognition:  Memory problems?  YES (see HPI)  Weight Loss: Have you lost 10 or more pounds unintentionally in the previous year?  NO  Energy: How much of the time during the past 3 weeks did you feel tired?   (check one of the following):   ?  All of the time  ? Most of the time  ? Some of the time  ? A little of the time  ? None of the Time    PHQ-2 Score:   PHQ-2 ( 1999 Pfizer) 1/30/2020 11/28/2018   Q1: Little interest or pleasure in doing things 0 0   Q2: Feeling down,  depressed or hopeless 0 0   PHQ-2 Score 0 0       PHQ-9 Score:   [facility] Follow-up to [facility] 12/9/2016   PHQ-9 9. Suicide Ideation past 2 weeks Not at all     Medical Care:     What other specialists or organizations are involved in your medical care?   Patient Care Team       Relationship Specialty Notifications Start End    Houston eMnchaca MD PCP - General Family Practice  4/8/11     Phone: [phone] Fax: [phone]         [address] [address]    Houston Menchaca MD MD Family Practice  11/18/16     referring to [facility]    Phone: [phone] Fax: [phone]         [address] [address]    Frank Diamond MD MD Colon and Rectal Surgery  11/18/16     Phone: [phone] Fax: [phone]         [address] [facility] [address] [facility] [address]        ASCVD 10 year RISK:  Calculated 01/30/20   The 10-year ASCVD risk score ([place] URRUTIA Jr., et al., 2013) is: 9.6%    Values used to calculate the score:      Age: 69 years      Sex: Female      Is Non- : No      Diabetic: No      Tobacco smoker: No      Systolic Blood Pressure: 138 mmHg      Is BP treated: No      HDL Cholesterol: 94.5 mg/dL      Total Cholesterol: 185.2 mg/dL     Therapy Plan:Statin not prescribed intentionally at this time: discussed risks/benefits of statin initiation today. Carli plans to think about it but prefers to avoid medication at this time.        Do you have an advanced directive? Yes - reviewed. Still desires to be full code. The scanned advanced directive form from 2013 in the chart was reviewed and still current.       Social History / Home Safety     Social History     Tobacco Use     Smoking status: Never Smoker     Smokeless tobacco: Never Used   Substance Use Topics     Alcohol use: Yes     Alcohol/week: 5.8 - 7.5 standard drinks     Types: 7 - 9 Standard drinks or equivalent per week     Marital Status: Partnered  Who lives in your household? Self and  Partner   Does your home have any of the following safety concerns? Loose rugs in the hallway, no grab bars in the bathroom, no handrails on the stairs or have poorly lit areas?  No   Do you feel threatened or controlled by a partner, ex-partner or anyone in your life? {Yes No   Has anyone hurt you physically, for example by pushing, hitting, slapping or kicking you   or forcing you to have sex? No       Functional Status     Do you need help with dressing yourself, bathing, or walking?No   Do you need help with the phone, transportation, shopping, preparing meals, housework, laundry, medications or managing money?No   By yourself and not using aids, do you have any difficulty walking up 10 steps  without resting? No   By yourself and not using aids, do you have any difficulty walking several hundred yards? No              Risk Behaviors and Healthy Habits     History   Smoking Status     Never Smoker   Smokeless Tobacco     Never Used     How many servings of fruits and vegetables do you eat a day? 2-3  Exercise:Does a exercise class at [facility] and does Treadmill  4-5 days/week for an average of 30-45 minutes  Do you frequently drive without a seatbelt? No   History   Smoking Status     Never Smoker   Smokeless Tobacco     Never Used     Do you use any other drugs? No       Do you use alcohol?No      Functional Ability   Was the patient's timed Up & Go test (Get up from chair walk, 10 feet turn, return to chair and sit down) unsteady or longer than 10 seconds? No     Fall risk:     1. Have you fallen two or more times in the past year? No   2. Have you fallen and had an injury in the past year?  No       Evaulation of Cognitive Function     Family member/caregiver input: Normal    1) Repeat 3 items (Leader, Season, Table)    2) Clock draw: NORMAL  3) 3 item recall: Recalls 2 objects   Results: NORMAL clock, 1-2 items recalled: COGNITIVE IMPAIRMENT LESS LIKELY    Mini-CogTM Copyright RONALDO Arreguin. Licensed by the  "author for use in [facility]; reprinted with permission (radha@[email or web address]). All rights reserved.          Other Assessments:     CV Risk based on Pooled Cohort Risk (consider assessing every 4-6 years; consider statin in patients with 10-yr risk > 7.5%):   The 10-year ASCVD risk score ([place] URRUTIA Jr., et al., 2013) is: 9.6%    Values used to calculate the score:      Age: 69 years      Sex: Female      Is Non- : No      Diabetic: No      Tobacco smoker: No      Systolic Blood Pressure: 138 mmHg      Is BP treated: No      HDL Cholesterol: 94.5 mg/dL      Total Cholesterol: 185.2 mg/dL    Advance Directives: Discussed with patient and family as appropriate.  Has patient completed advance directives? Yes, advance care planning is on file. Reviewed and discussed, as above.    Immunization History   Administered Date(s) Administered     HEPA 05/12/1997, 12/01/1997     HepB 09/30/1997, 06/12/2006, 12/11/2006     Influenza (H1N1) 01/07/2010     Influenza (High Dose) 3 valent vaccine 10/11/2016, 09/30/2018     Influenza (IIV3) PF 11/14/1996, 11/07/1997, 10/17/2006, 11/06/2007, 11/19/2008, 09/29/2011, 09/17/2014     Influenza Vaccine IM > 6 months Valent IIV4 10/21/2015, 10/17/2019     OPV, trivalent, live 08/05/1958, 06/22/1960, 07/27/1966     Pneumo Conj 13-V (2010&after) 01/30/2020     Small Pox 12/21/1966     TD (ADULT, 7+) 06/22/1960, 06/19/1963, 07/27/1966, 07/19/2002     TDAP Vaccine (Boostrix) 05/07/2013     Tetanus 01/01/1969     Typhoid Oral 07/11/2006, 07/11/2006, 07/13/2006, 07/15/2006, 07/17/2006     Yellow Fever 07/11/2006     Zoster vaccine, live 04/20/2010, 02/25/2019, 04/30/2019     Reviewed Immunization Record Today.    Physical Exam     Vitals: /78   Temp 98.4  F (36.9  C) (Oral)   Ht 1.499 m (4' 11\")   Wt 53.5 kg (118 lb)   BMI 23.83 kg/m    BMI= Body mass index is 23.83 kg/m .  GENERAL APPEARANCE: Alert and no distress, pleasant and interactive  HENT: ear canals " "patent and TM's normal; mouth without ulcers or lesions; and oral mucous membranes moist. Inferior turbinates are red and edematous bilaterally.   Hearing loss screen:  Normal.  DENTITION:  Good repair?  yes  RESP: lungs clear to auscultation - no rales, rhonchi or wheezes  CV: regular rates and rhythm, no murmur, click or rub, no peripheral edema and peripheral pulses strong  SKIN: no suspicious lesions or rashes  NEURO: Normal strength and tone  MENTAL STATUS EXAM  Appearance: appropriate  Attitude: cooperative  Behavior: normal  Eye Contact: normal  Speech: normal  Orientation: oreinted to person , place, time and situation  Mood:  \"good\"  Affect: Mood Congruent  Thought Process: clear  MUSCULOSKELETAL: Negative Tinel sign and Phalen maneuver. Unable to reproduce numbness or tingling on exam. Normal strength and sensation of bilateral upper extremities. Hyperextending R arm and rotation behind back does not elicit numbness in R hand or forearm.      Assessment and Plan     Medicare Annual Wellness Exam - reviewed Preventive Services and Plan form with patient as specified in Patient Instructions.    1. Medicare annual wellness visit, subsequent  Discussed and encouraged preventative care. Carli will get the pneumococcal vaccine today. Gave information about initiation of statin. She prefers to defer medication today, but will consider it in the future.   - Pneumococcal vaccine 13 valent PCV13 IM (Prevnar) [[id number]]    2. Hearing loss, unspecified hearing loss type, unspecified laterality  Gradual worsening of hearing bilaterally, most noticeable over the past one year. Carli usually notices this in conversations with her  or with the TV.   - AUDIOLOGY ADULT REFERRAL; Future    3. Impingement syndrome of shoulder region, right  Carli has a history of a frozen R shoulder and has recently been experiencing intermittent soreness. She has also been experiencing R hand numbness occasionally when walking on the " treadmill and nightly when holding her book or newspaper to read before bed. No evidence for carpal tunnel on exam or based on history, as her wrists are in a very neutral position. She has experienced benefit from PT in the past and is interested in returning, so a referral was made today.   - [facility] REFERRAL; Future    5. Advanced directives, counseling/discussion  Discussed advanced directive, up to date and on file.     6. Cough  Longstanding post-nasal drip and associated mild cough/feeling that she needs to clear her throat. Based on clinical history and exam, likely allergic in nature. Discussed use of a Neti Pot, saline drops, daily non-drowsy antihistamine, or nasal steroid spray (I.e. Flonase) and encouraged good hydration.      Options for treatment and follow-up care were reviewed with the Carli STEPHENS Christakatelynn and/or guardian engaged in the decision making process and verbalized understanding of the options discussed and agreed with the final plan.    Josie Love, MS3  Carli was seen and evaluated with Dr. Houston Menchaca.    I was present with the medical student who participated in the service and in the documentation of this note. I have verified the history and personally performed the physical exam and medical decision making, and have verified the content of the note, which accurately reflects my assessment of the patient and the plan of care.   Houston Menchaca MD

## 2020-02-05 ENCOUNTER — THERAPY VISIT (OUTPATIENT)
Dept: PHYSICAL THERAPY | Facility: CLINIC | Age: 70
End: 2020-02-05
Attending: FAMILY MEDICINE
Payer: MEDICARE

## 2020-02-05 DIAGNOSIS — M75.41 IMPINGEMENT SYNDROME OF SHOULDER REGION, RIGHT: ICD-10-CM

## 2020-02-05 DIAGNOSIS — M54.12 CERVICAL RADICULOPATHY: ICD-10-CM

## 2020-02-05 PROCEDURE — 97110 THERAPEUTIC EXERCISES: CPT | Mod: GP | Performed by: PHYSICAL THERAPIST

## 2020-02-05 PROCEDURE — 97112 NEUROMUSCULAR REEDUCATION: CPT | Mod: GP | Performed by: PHYSICAL THERAPIST

## 2020-02-05 PROCEDURE — 97161 PT EVAL LOW COMPLEX 20 MIN: CPT | Mod: GP | Performed by: PHYSICAL THERAPIST

## 2020-02-05 NOTE — LETTER
DEPARTMENT OF HEALTH AND HUMAN SERVICES  CENTERS FOR MEDICARE & MEDICAID SERVICES    PLAN/UPDATED PLAN OF PROGRESS FOR OUTPATIENT REHABILITATION    PATIENTS NAME:  Carli Michelle   : 1950  PROVIDER NUMBER:    [id number]  HICN:  [id number]  PROVIDER NAME: [facility] PHYSICAL [facility]  MEDICAL RECORD NUMBER: [id number]   START OF CARE DATE:  SOC Date: 20   TYPE:  PT    PRIMARY/TREATMENT DIAGNOSIS: (Pertinent Medical Diagnosis)     Impingement syndrome of shoulder region, right  Cervical radiculopathy    VISITS FROM START OF CARE:  Rxs Used: 1     [place] [facility] Initial Evaluation -- Cervical  Evaluation Date: 2020  Carli Michelle is a 69 year old female with a R shoulder/neck condition.   Referral: IM  Work mechanical stresses: retired   Employment status: retired  Leisure mechanical stresses: normal household activities, [facility] [employer]  Functional disability score (NDI):    VAS score (0-10): 2/10  Patient goals/expectations:  To be able to read in bed and walk on the treadmill without my R hand going numb.    HISTORY:    Present symptoms:  R scapular pain, R neck pain  Pain quality (sharp/shooting/stabbing/aching/burning/cramping):   Aching, tight.  Paresthesia (yes/no):  Has R hand numbness with holding the newspaper while reading in bed (lying on back with pillow under her upper back/neck), also with holding the railing on the treadmill while walking--becoming more frequent and comes on quicker over the past 5 years.  Carli Michelle being seen for right shoulder, numbness in right hand.  Problem began 2010 and reported as 2/10 on pain scale. General health as reported by patient is excellent. Pertinent medical history includes:  Numbness/tingling.  Medical allergies: other. Other medical allergies details: sulfa drugs. Surgeries include:  Other. Other surgery history details: apendectomy.  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  Patient is retired.   Present since (onset date): 20+ years; saw MD on 02/04/2020 who ordered PT.     Symptoms (improving/unchanging/worsening):  worsening.  Symptoms commenced as a result of: unknown   Condition occurred in the following environment:  unknown  Symptoms at onset (neck/arm/forearm/headache): R neck pain  Constant symptoms (neck/arm/forearm/headache): none  Intermittent symptoms (neck/arm/forearm/headache): R neck pain, R hand numbness  Symptoms are made worse with the following: lying on her back/R side in bed and reading--R hand goes numb in approximately 3 minutes; walking on treadmill and holding on to the railings--R hand goes numb in 3 minutes   Symptoms are made better with the following: changing positions  Disturbed sleep (yes/no): no  Number of pillows: 1  Sleeping postures (prone/sup/side R/L): sides  Previous episodes (0/1-5/6-10/11+): 11+ Year of first episode: many years  Previous history: ongoing problems with R shoulder/shoulder blade  Previous treatments: PT--helpful and resolved pain    Specific Questions: (as reported by the patient)  Dizziness/Tinnitus/Nausea/Swallowing (pos/neg): neg  Gait/Upper Limbs (normal/abnormal):  normal  Medications (nil/NSAIDS/anlag/steroids/anticoag/other):  None  Medical allergies:  sulfa  General health (excellent/good/fair/poor):  excellent  Pertinent medical history:  Calf pain, numbness/tingling  Imaging (None/Xray/MRI/Other):  none  Recent or major surgery (yes/no): no--hx of appendectomy  Night pain (yes/no): no  Accidents (yes/no): no  Unexplained weight loss (yes/no): no  Barriers at home: no  Other red flags: no    EXAMINATION    Posture:   Sitting (good/fair/poor): fair  Standing (good/fair/poor): good     Protruded head (yes/no): yes    Wry Neck (right/left/nil):  nil  Relevant (yes/no):  na     Correction of posture(better/worse/no effect): NE  Other observations:  none    Neurological:    Motor Deficit:   normal   Reflexes:  Not assessed  Sensory Deficit:  normal   Dural signs:  Not assessed    Movement Loss:   Jessee Mod Min Nil Pain   Protrusion    x NE   Flexion    x NE   Retraction   x  NE   Extension   x  NE   Lateral flexion R    x Tight L neck   Lateral flexion L    x Tight R neck   Rotation R    x Increases R scapular pain   Rotation L    x NE       Test Movements:   During: produces, abolishes, increases, decreases, no effect, centralizing, peripheralizing  After: better, worse, no better, no worse, no effect, centralized, peripheralized    Pretest symptoms sitting: R scapular pain 2/10   Symptoms During Symptoms After ROM increased ROM decreased No Effect   PRO        Rep PRO        RET W/self-OP--NE No Effect         Rep RET W/self-OP--produces R neck pain No Worse neck  R scapula better      X--abolished R scapular pain with R rotation   RET EXT        Rep RET EXT        Pretest symptoms lying:     Symptoms During Symptoms After ROM increased ROM decreased No Effect   RET        Rep RET        RET EXT        Rep RET EXT        If required, pretest symptoms sitting:      Symptoms During Symptoms After ROM increased ROM decreased No Effect   LF-R        Rep LF-R        LF-L        Rep LF-L        ROT-R        Rep ROT-R        ROT-L        Rep ROT-L        FLEX        Rep FLEX            Static Tests:   Protrusion:    Flexion:    Retraction:    Extension (sitting/prone/supine):      Other Tests:     Provisional Classification:  Derangement - Asymmetrical, unilateral, symptoms below elbow        Principle of Management:  Education:  Posture--use of lumbar roll in sitting, correct posture for neck with reading at night, role and impact of posture, POC, treatment rationale and expectations.  Equipment provided:  none  Mechanical therapy (Y/N):  y   Extension principle:  Repeated cervical retraction with self-OP x10 reps, every 2 hours  Lateral principle:  Flexion principle:  Other:      ASSESSMENT/PLAN:  Patient  is a 69 year old female with cervical and R UE complaints.  Provisional classification of derangement with directional preference for retraction.  R scapular pain was abolished after repeated cervical retraction with self-OP today.  R hand numbness was not reproduced so she will assess effect on this as she does exercises at home.  She needs to change postures with some of her activities--I.e. reading and walking on the treadmill--to decrease stress on her spine and assist with resolution of symptoms.    Patient has the following significant findings with corresponding treatment plan.                Diagnosis 1:  R cervical radiculopathy  Pain -  self management, education, directional preference exercise and home program  Decreased ROM/flexibility - manual therapy, therapeutic exercise and home program  Decreased function - therapeutic activities, home program  Impaired posture - neuro re-education and home program    Cumulative Therapy Evaluation is: Low complexity.  Previous and current functional limitations:  (See Goal Flow Sheet for this information)    Short term and Long term goals: (See Goal Flow Sheet for this information)   Communication ability:  Patient appears to be able to clearly communicate and understand verbal and written communication and follow directions correctly.  Treatment Explanation - The following has been discussed with the patient:   RX ordered/plan of care  Anticipated outcomes  Possible risks and side effects  This patient would benefit from PT intervention to resume normal activities.   Rehab potential is good.  Frequency:  1 X week, once daily  Duration:  for 4 weeks tapering to 2 X a month over 1 month   Discharge Plan:  Achieve all LTG.  Independent in home treatment program.  Reach maximal therapeutic benefit.    Caregiver Signature/Credentials _____________________________ Date ________       Treating Provider: Clara Aguirre, PT   I have reviewed and certified the need for these  "services and plan of treatment while under my care.    PHYSICIAN'S SIGNATURE:   _________________________ Date___________                                           Houston Menchaca MD    Certification period:  Beginning of Cert date period: 02/05/20 to  End of Cert period date: 04/15/20     Functional Level Progress Report: Please see attached \"Goal Flow sheet for Functional level.\"  ____X____ Continue Services or       ________ DC Services              Service dates: From  SOC Date: 02/05/20 date to present                       "

## 2020-02-05 NOTE — PROGRESS NOTES
[place] [facility] Initial Evaluation -- Cervical    Evaluation Date: February 5, 2020  Carli Michelle is a 69 year old female with a R shoulder/neck condition.   Referral: IM  Work mechanical stresses: retired   Employment status: retired  Leisure mechanical stresses: normal household activities, working out at [employer]  Functional disability score (NDI):    VAS score (0-10): 2/10  Patient goals/expectations:  To be able to read in bed and walk on the treadmill without my R hand going numb.    HISTORY:    Present symptoms:  R scapular pain, R neck pain  Pain quality (sharp/shooting/stabbing/aching/burning/cramping):   Aching, tight.  Paresthesia (yes/no):  Has R hand numbness with holding the newspaper while reading in bed (lying on back with pillow under her upper back/neck), also with holding the railing on the treadmill while walking--becoming more frequent and comes on quicker over the past 5 years    Present since (onset date): 20+ years; saw MD on 02/04/2020 who ordered PT.     Symptoms (improving/unchanging/worsening):  worsening.    Symptoms commenced as a result of: unknown   Condition occurred in the following environment:  unknown    Symptoms at onset (neck/arm/forearm/headache): R neck pain  Constant symptoms (neck/arm/forearm/headache): none  Intermittent symptoms (neck/arm/forearm/headache): R neck pain, R hand numbness    Symptoms are made worse with the following: lying on her back/R side in bed and reading--R hand goes numb in approximately 3 minutes; walking on treadmill and holding on to the railings--R hand goes numb in 3 minutes   Symptoms are made better with the following: changing positions    Disturbed sleep (yes/no): no  Number of pillows: 1  Sleeping postures (prone/sup/side R/L): sides    Previous episodes (0/1-5/6-10/11+): 11+ Year of first episode: many years    Previous history: ongoing problems with R shoulder/shoulder blade  Previous treatments: PT--helpful and  resolved pain    Specific Questions: (as reported by the patient)  Dizziness/Tinnitus/Nausea/Swallowing (pos/neg): neg  Gait/Upper Limbs (normal/abnormal):  normal  Medications (nil/NSAIDS/anlag/steroids/anticoag/other):  None  Medical allergies:  sulfa  General health (excellent/good/fair/poor):  excellent  Pertinent medical history:  Calf pain, numbness/tingling  Imaging (None/Xray/MRI/Other):  none  Recent or major surgery (yes/no): no--hx of appendectomy  Night pain (yes/no): no  Accidents (yes/no): no  Unexplained weight loss (yes/no): no  Barriers at home: no  Other red flags: no    EXAMINATION    Posture:   Sitting (good/fair/poor): fair  Standing (good/fair/poor): good     Protruded head (yes/no): yes    Wry Neck (right/left/nil):  nil  Relevant (yes/no):  na     Correction of posture(better/worse/no effect): NE  Other observations:  none    Neurological:    Motor Deficit:  normal   Reflexes:  Not assessed  Sensory Deficit:  normal   Dural signs:  Not assessed    Movement Loss:   Jessee Mod Min Nil Pain   Protrusion    x NE   Flexion    x NE   Retraction   x  NE   Extension   x  NE   Lateral flexion R    x Tight L neck   Lateral flexion L    x Tight R neck   Rotation R    x Increases R scapular pain   Rotation L    x NE     Test Movements:   During: produces, abolishes, increases, decreases, no effect, centralizing, peripheralizing  After: better, worse, no better, no worse, no effect, centralized, peripheralized    Pretest symptoms sitting: R scapular pain 2/10   Symptoms During Symptoms After ROM increased ROM decreased No Effect   PRO        Rep PRO        RET W/self-OP--NE No Effect         Rep RET W/self-OP--produces R neck pain No Worse neck  R scapula better      X--abolished R scapular pain with R rotation   RET EXT        Rep RET EXT        Pretest symptoms lying:     Symptoms During Symptoms After ROM increased ROM decreased No Effect   RET        Rep RET        RET EXT        Rep RET EXT        If  required, pretest symptoms sitting:      Symptoms During Symptoms After ROM increased ROM decreased No Effect   LF-R        Rep LF-R        LF-L        Rep LF-L        ROT-R        Rep ROT-R        ROT-L        Rep ROT-L        FLEX        Rep FLEX            Static Tests:   Protrusion:    Flexion:    Retraction:    Extension (sitting/prone/supine):      Other Tests:     Provisional Classification:  Derangement - Asymmetrical, unilateral, symptoms below elbow    Principle of Management:  Education:  Posture--use of lumbar roll in sitting, correct posture for neck with reading at night, role and impact of posture, POC, treatment rationale and expectations.    Equipment provided:  none  Mechanical therapy (Y/N):  y   Extension principle:  Repeated cervical retraction with self-OP x10 reps, every 2 hours   Lateral principle:    Flexion principle:     Other:      ASSESSMENT/PLAN:    Patient is a 69 year old female with cervical and R UE complaints.  Provisional classification of derangement with directional preference for retraction.  R scapular pain was abolished after repeated cervical retraction with self-OP today.  R hand numbness was not reproduced so she will assess effect on this as she does exercises at home.  She needs to change postures with some of her activities--I.e. reading and walking on the treadmill--to decrease stress on her spine and assist with resolution of symptoms.        Patient has the following significant findings with corresponding treatment plan.                Diagnosis 1:  R cervical radiculopathy  Pain -  self management, education, directional preference exercise and home program  Decreased ROM/flexibility - manual therapy, therapeutic exercise and home program  Decreased function - therapeutic activities, home program  Impaired posture - neuro re-education and home program    Cumulative Therapy Evaluation is: Low complexity.    Previous and current functional limitations:  (See Goal  Flow Sheet for this information)    Short term and Long term goals: (See Goal Flow Sheet for this information)     Communication ability:  Patient appears to be able to clearly communicate and understand verbal and written communication and follow directions correctly.  Treatment Explanation - The following has been discussed with the patient:   RX ordered/plan of care  Anticipated outcomes  Possible risks and side effects  This patient would benefit from PT intervention to resume normal activities.   Rehab potential is good.    Frequency:  1 X week, once daily  Duration:  for 4 weeks tapering to 2 X a month over 1 month   Discharge Plan:  Achieve all LTG.  Independent in home treatment program.  Reach maximal therapeutic benefit.    Please refer to the daily flowsheet for treatment today, total treatment time and time spent performing 1:1 timed codes.

## 2020-02-06 NOTE — PROGRESS NOTES
[place] [facility] Initial Evaluation  Subjective:    Carli Michelle being seen for right shoulder, numbness in right hand.   Problem began 2/5/2015.   and reported as 2/10 on pain scale. General health as reported by patient is excellent. Pertinent medical history includes:  Numbness/tingling.  Medical allergies: other. Other medical allergies details: sulfa drugs.  Surgeries include:  Other. Other surgery history details: apendectomy.     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           Patient is retired.                           Objective:  System    Physical Exam    General     ROS    Assessment/Plan:

## 2020-02-19 ENCOUNTER — THERAPY VISIT (OUTPATIENT)
Dept: PHYSICAL THERAPY | Facility: CLINIC | Age: 70
End: 2020-02-19
Payer: MEDICARE

## 2020-02-19 DIAGNOSIS — M54.12 CERVICAL RADICULOPATHY: ICD-10-CM

## 2020-02-19 PROCEDURE — 97530 THERAPEUTIC ACTIVITIES: CPT | Mod: GP | Performed by: PHYSICAL THERAPIST

## 2020-02-19 PROCEDURE — 97112 NEUROMUSCULAR REEDUCATION: CPT | Mod: GP | Performed by: PHYSICAL THERAPIST

## 2020-02-19 PROCEDURE — 97110 THERAPEUTIC EXERCISES: CPT | Mod: GP | Performed by: PHYSICAL THERAPIST

## 2020-02-28 ENCOUNTER — DOCUMENTATION ONLY (OUTPATIENT)
Dept: FAMILY MEDICINE | Facility: CLINIC | Age: 70
End: 2020-02-28

## 2020-02-28 NOTE — PROGRESS NOTES
"When opening a documentation only encounter, be sure to enter in \"Chief Complaint\" Forms and in \" Comments\" Title of form, description if needed.    Carli is a 69 year old  female  Form received via: Fax  Form now resides in: Provider Ready    Leyda Rea CMA          Form has been completed by provider.     Form sent out via: Fax to [facility] athletic medicine 02/28/2020 at Fax Number: [phone]  Patient informed: NA   Output date: February 28, 2020    Leyda Rea CMA      **Please close the encounter**          "

## 2020-07-20 ENCOUNTER — HOSPITAL ENCOUNTER (OUTPATIENT)
Dept: MAMMOGRAPHY | Facility: CLINIC | Age: 70
Discharge: HOME | End: 2020-07-20
Attending: FAMILY MEDICINE | Admitting: FAMILY MEDICINE
Payer: MEDICARE

## 2020-07-20 DIAGNOSIS — Z12.31 SCREENING MAMMOGRAM, ENCOUNTER FOR: ICD-10-CM

## 2020-07-20 PROCEDURE — 77063 BREAST TOMOSYNTHESIS BI: CPT

## 2020-10-19 ENCOUNTER — OFFICE VISIT (OUTPATIENT)
Dept: FAMILY MEDICINE | Facility: CLINIC | Age: 70
End: 2020-10-19
Payer: MEDICARE

## 2020-10-19 VITALS
RESPIRATION RATE: 16 BRPM | HEART RATE: 66 BPM | TEMPERATURE: 98.7 F | WEIGHT: 118.8 LBS | BODY MASS INDEX: 23.95 KG/M2 | HEIGHT: 59 IN | SYSTOLIC BLOOD PRESSURE: 155 MMHG | DIASTOLIC BLOOD PRESSURE: 75 MMHG | OXYGEN SATURATION: 98 %

## 2020-10-19 DIAGNOSIS — T14.8XXD DELAYED WOUND HEALING: Primary | ICD-10-CM

## 2020-10-19 DIAGNOSIS — M77.8 DELTOID TENDINITIS OF RIGHT SHOULDER: ICD-10-CM

## 2020-10-19 LAB — HBA1C MFR BLD: 4.8 % (ref 4.1–5.7)

## 2020-10-19 PROCEDURE — 83036 HEMOGLOBIN GLYCOSYLATED A1C: CPT | Mod: GZ | Performed by: FAMILY MEDICINE

## 2020-10-19 PROCEDURE — 99213 OFFICE O/P EST LOW 20 MIN: CPT | Performed by: FAMILY MEDICINE

## 2020-10-19 PROCEDURE — 36415 COLL VENOUS BLD VENIPUNCTURE: CPT | Performed by: FAMILY MEDICINE

## 2020-10-19 ASSESSMENT — MIFFLIN-ST. JEOR: SCORE: 959.5

## 2020-10-19 NOTE — PROGRESS NOTES
audiologyDenise is a 70 year old  who presents for   Patient presents with:  Pain: Chronic right arm pain, present since May, limiting mobility. Patient states the pain is more localized to the upper arm. Patient states it was triggered by digging/moving many plants in her yard.   WOUND CARE: Patient has an anti itch (diphenhydramine) cream that she has been using for 2 months on healing burn site on the right hand.     Assessment and Plan      1. Delayed wound healing  With lichenification from scratching  Recommended using hydrocortisone ointment to decrease inflamation  - Hemoglobin A1c ([facility])    2. Deltoid tendinitis of right shoulder  Ice Two times daily, ROM exercises, then advance to low weights if getting better       No follow-ups on file.    Medications Discontinued During This Encounter   Medication Reason     conjugated estrogens (PREMARIN) vaginal cream Stopped by Patient         Houston Menchaca MD         [facility] [place]       Carli is a 70 year old  who presents for   Patient presents with:  Pain: Chronic right arm pain, present since May, limiting mobility. Patient states the pain is more localized to the upper arm. Patient states it was triggered by digging/moving many plants in her yard.   WOUND CARE: Patient has an anti itch (diphenhydramine) cream that she has been using for 2 months on healing burn site on the right hand.         Visit [facility]  1. R upper arm pain  Has been intermittent for 6-12 months, worse with lifting and gardening -no injury.  2. Right had has a healing burn for the last 2 months. Very itchy        Problem, Medication and Allergy Lists were reviewed and updated if needed..  Patient is an established patient of this clinic.         Review of Systems:   Review of Systems  7 point review of symptoms was otherwise negative except as noted in HPI         Physical Exam:     Vitals:    10/19/20 1116   BP: (!) 155/75   BP Location: Right arm   Patient Position: Sitting   Cuff Size: Adult  "Regular   Pulse: 66   Resp: 16   Temp: 98.7  F (37.1  C)   TempSrc: Oral   SpO2: 98%   Weight: 53.9 kg (118 lb 12.8 oz)   Height: 1.499 m (4' 11\")     Body mass index is 23.99 kg/m .  Vitals were reviewed and were normal     Physical Exam  Vitals signs and nursing note reviewed.   Constitutional:       General: She is not in acute distress.     Appearance: She is well-developed. She is not diaphoretic.   Cardiovascular:      Rate and Rhythm: Normal rate.   Musculoskeletal:      Right shoulder: She exhibits tenderness ( over deltoid insertion, ) and pain.        Arms:    Skin:     General: Skin is warm and dry.   Neurological:      Mental Status: She is alert and oriented to person, place, and time.   Psychiatric:         Behavior: Behavior normal.         Thought Content: Thought content normal.           Results:      Results from this visit  Results for orders placed or performed in visit on 10/19/20   Hemoglobin A1c ([facility])     Status: None   Result Value Ref Range    Hemoglobin A1C 4.8 4.1 - 5.7 %       Options for treatment and follow-up care were reviewed with the patient. Carli Michelle  engaged in the decision making process and verbalized understanding of the options discussed and agreed with the final plan.  Houston Menchaca MD  [facility]          "

## 2020-10-19 NOTE — PATIENT INSTRUCTIONS
Here is the plan from today's visit    1. Delayed wound healing  Try over the counter 1% hydrocortisone ointment   - Hemoglobin A1c ([facility])    2. Deltoid tendinitis of right shoulder  No weight bearing in the R arm for 2 weeks. Ice to mid arm two times daily, two times daily range of motion exercises -progress to light weights when pain decreases      Please call or return to clinic if your symptoms don't go away.    Follow up plan  Return for [facility] in 2 weeks if not improved.     Thank you for coming to [facility] today.  Lab Testing:  **If you had lab testing today and your results are reassuring or normal they will be mailed to you or sent through [employer] within 7 days.   **If the lab tests need quick action we will call you with the results.  The phone number we will call with results is # There are no phone numbers on file.. If this is not the best number please call our clinic and change the number.  Medication Refills:  If you need any refills please call your pharmacy and they will contact us.   If you need to  your refill at a new pharmacy, please contact the new pharmacy directly. The new pharmacy will help you get your medications transferred faster.   Scheduling:  If you have any concerns about today's visit or wish to schedule another appointment please call our office during normal business hours [phone] (8-5:00 M-F)   eferrals to [facility] please call [phone].   Mammogram Scheduling [phone]     XRay/CT/Ultrasound/MRI Scheduling [phone]    Medical Concerns:  If you have urgent medical concerns please call [phone] at any time of the day.    Houston Menchaca MD

## 2020-12-30 NOTE — PROGRESS NOTES
Subjective:  HPI  Physical Exam                    Objective:  System    Physical Exam    General     ROS    Assessment/Plan:    DISCHARGE REPORT    Progress reporting period is from 02/05/2020 to 02/19/2020.       SUBJECTIVE  Subjective: Patient reports her R UE numbness does not seem as frequent.  She was able to walk on the treadmill with her hands on the railings the other day without R UE numbness.  Still gets numbness with reading in bed and holding the book with her R hand, but this seems not as intense.      Current Pain level: As of 02/19/2020:  0/10.     Initial Pain level: 2/10.   Changes in function:  Unknown as patient did not return for additional follow-up visits.  Adverse reaction to treatment or activity: Unknown as patient did not return for additional follow-up visits.    OBJECTIVE  Objective:   As of 02/19/2020:  No symptoms in clinic today.  Corrected technique with exercises and worked more to end range.  Pt to continue as previously.     Current objective measurements are unavailable as patient did not return for additional follow-up visits.    ASSESSMENT/PLAN  Patient was seen for 2 visits with treatment focusing on cervical retraction exercises and posture to address neck and R UE symptoms.  She was doing well at her last visit and planned to follow-up if not continuing to improve.  No additional follow-up visits have been scheduled, so patient is discharged from PT.    Updated problem list and treatment plan: Diagnosis 1:  Neck, R UE numbness   No updated problem list or treatment plan as patient did not return for additional visits and is discharged from PT at this time.    STG/LTGs have been met or progress has been made towards goals:  Unknown as patient did not return for additional follow-up visits.  Assessment of Progress: The patient has not returned to therapy. Current status is unknown.  Self Management Plans:  Patient has been instructed in a home treatment program.  Patient  has  been instructed in self management of symptoms.  Carli continues to require the following intervention to meet STG and LTG's:  PT intervention is no longer required to meet STG/LTG.    Recommendations:  Patient is discharged from PT as she did not require further follow-up visits.    Please refer to the daily flowsheet for treatment today, total treatment time and time spent performing 1:1 timed codes.

## 2021-01-24 ENCOUNTER — MYC MEDICAL ADVICE (OUTPATIENT)
Dept: FAMILY MEDICINE | Facility: CLINIC | Age: 71
End: 2021-01-24

## 2021-01-24 DIAGNOSIS — H90.6 MIXED HEARING LOSS, BILATERAL: Primary | ICD-10-CM

## 2021-02-02 ENCOUNTER — ANCILLARY PROCEDURE (OUTPATIENT)
Dept: GENERAL RADIOLOGY | Facility: CLINIC | Age: 71
End: 2021-02-02
Attending: FAMILY MEDICINE
Payer: MEDICARE

## 2021-02-02 ENCOUNTER — OFFICE VISIT (OUTPATIENT)
Dept: FAMILY MEDICINE | Facility: CLINIC | Age: 71
End: 2021-02-02
Payer: MEDICARE

## 2021-02-02 VITALS
BODY MASS INDEX: 22.93 KG/M2 | OXYGEN SATURATION: 100 % | HEART RATE: 57 BPM | HEIGHT: 60 IN | TEMPERATURE: 98.3 F | DIASTOLIC BLOOD PRESSURE: 81 MMHG | SYSTOLIC BLOOD PRESSURE: 147 MMHG | RESPIRATION RATE: 16 BRPM | WEIGHT: 116.8 LBS

## 2021-02-02 DIAGNOSIS — M25.511 CHRONIC RIGHT SHOULDER PAIN: ICD-10-CM

## 2021-02-02 DIAGNOSIS — G89.29 CHRONIC RIGHT SHOULDER PAIN: ICD-10-CM

## 2021-02-02 DIAGNOSIS — Z00.00 HEALTHCARE MAINTENANCE: ICD-10-CM

## 2021-02-02 DIAGNOSIS — M75.41 IMPINGEMENT SYNDROME OF SHOULDER REGION, RIGHT: Primary | ICD-10-CM

## 2021-02-02 PROCEDURE — G0009 ADMIN PNEUMOCOCCAL VACCINE: HCPCS | Performed by: FAMILY MEDICINE

## 2021-02-02 PROCEDURE — 90732 PPSV23 VACC 2 YRS+ SUBQ/IM: CPT | Performed by: FAMILY MEDICINE

## 2021-02-02 PROCEDURE — 99212 OFFICE O/P EST SF 10 MIN: CPT | Mod: 25 | Performed by: FAMILY MEDICINE

## 2021-02-02 PROCEDURE — 20610 DRAIN/INJ JOINT/BURSA W/O US: CPT | Mod: RT | Performed by: FAMILY MEDICINE

## 2021-02-02 PROCEDURE — 73030 X-RAY EXAM OF SHOULDER: CPT | Mod: RT | Performed by: RADIOLOGY

## 2021-02-02 RX ORDER — LIDOCAINE HYDROCHLORIDE 10 MG/ML
9 INJECTION, SOLUTION INFILTRATION; PERINEURAL ONCE
Status: DISCONTINUED | OUTPATIENT
Start: 2021-02-02 | End: 2021-02-02 | Stop reason: CLARIF

## 2021-02-02 RX ORDER — TRIAMCINOLONE ACETONIDE 40 MG/ML
40 INJECTION, SUSPENSION INTRA-ARTICULAR; INTRAMUSCULAR ONCE
Status: COMPLETED | OUTPATIENT
Start: 2021-02-02 | End: 2021-02-02

## 2021-02-02 RX ADMIN — TRIAMCINOLONE ACETONIDE 40 MG: 40 INJECTION, SUSPENSION INTRA-ARTICULAR; INTRAMUSCULAR at 12:18

## 2021-02-02 ASSESSMENT — MIFFLIN-ST. JEOR: SCORE: 966.3

## 2021-02-02 NOTE — PATIENT INSTRUCTIONS
Here is the plan from today's visit    1. Chronic right shoulder pain  Shot today - not sure how long this will last  May have some rotator cuff too  You'll get a call for your PT scheduling - let them know where you do/do not want to go (but thru the [facility])  - X-ray rt Shoulder G/E 3 vw; Future  - [facility], PT, HAND AND CHIROPRACTIC REFERRAL - [facility]; Future    2. Healthcare maintenance    - Pneumococcal vaccine 23 valent PPSV23  (Pneumovax) [[id number]]    3. Impingement syndrome of shoulder region, right    - [facility], PT, HAND AND CHIROPRACTIC REFERRAL - [facility]; Future      Please call or return to clinic if your symptoms don't go away.    Follow up plan  6-8 weeks if not improving  Thank you for coming to [facility] today.  Lab Testing:  **If you had lab testing today and your results are reassuring or normal they will be mailed to you or sent through [employer] within 7 days.   **If the lab tests need quick action we will call you with the results.  The phone number we will call with results is # [phone] (home) . If this is not the best number please call our clinic and change the number.  Medication Refills:  If you need any refills please call your pharmacy and they will contact us.   If you need to  your refill at a new pharmacy, please contact the new pharmacy directly. The new pharmacy will help you get your medications transferred faster.   Scheduling:  If you have any concerns about today's visit or wish to schedule another appointment please call our office during normal business hours [phone] (8-5:00 M-F)   eferrals to [facility] please call [phone].   Mammogram Scheduling [phone]     XRay/CT/Ultrasound/MRI Scheduling [phone]    Medical Concerns:  If you have urgent medical concerns please call [phone] at any time of the day.    Yareli Wheeler MD

## 2021-02-02 NOTE — PROGRESS NOTES
PROCEDURE:  JOINT ASPIRATION/INJECTION    After a discussion of risks, benefits and side effects of procedure, informed patient consent was obtained.  The right shoulder was prepped and draped in the usual clean fashion (sterile not required for this procedure).  Cold spray was used for local analgesia.     ASPIRATION: Not performed.    INJECTION:  Using 9 cc of 1 % lidocaine mixed with 40 mg of kenalog, the R subacromial space was successfully injected without complication.  Patient did experience some pain relief following injection.    Invasive Procedure Safety Checklist completed by nurse? No

## 2021-02-03 ENCOUNTER — TRANSFERRED RECORDS (OUTPATIENT)
Dept: HEALTH INFORMATION MANAGEMENT | Facility: CLINIC | Age: 71
End: 2021-02-03

## 2021-02-08 ENCOUNTER — THERAPY VISIT (OUTPATIENT)
Dept: PHYSICAL THERAPY | Facility: CLINIC | Age: 71
End: 2021-02-08
Attending: FAMILY MEDICINE
Payer: MEDICARE

## 2021-02-08 DIAGNOSIS — G89.29 CHRONIC RIGHT SHOULDER PAIN: ICD-10-CM

## 2021-02-08 DIAGNOSIS — M75.41 IMPINGEMENT SYNDROME OF SHOULDER REGION, RIGHT: ICD-10-CM

## 2021-02-08 DIAGNOSIS — M25.511 CHRONIC RIGHT SHOULDER PAIN: ICD-10-CM

## 2021-02-08 PROCEDURE — 97110 THERAPEUTIC EXERCISES: CPT | Mod: GP | Performed by: PHYSICAL THERAPIST

## 2021-02-08 PROCEDURE — 97161 PT EVAL LOW COMPLEX 20 MIN: CPT | Mod: GP | Performed by: PHYSICAL THERAPIST

## 2021-02-08 NOTE — LETTER
DEPARTMENT OF HEALTH AND HUMAN SERVICES  CENTERS FOR MEDICARE & MEDICAID SERVICES    PLAN/UPDATED PLAN OF PROGRESS FOR OUTPATIENT REHABILITATION     PATIENTS NAME:  Carli Michelle   : 1950    PROVIDER NUMBER:    [id number]    HICN:  [id number]     PROVIDER NAME: [facility]    MEDICAL RECORD NUMBER: [id number]     START OF CARE DATE:  SOC Date: 21   TYPE:  PT    PRIMARY/TREATMENT DIAGNOSIS: (Pertinent Medical Diagnosis)     Chronic right shoulder pain  Impingement syndrome of shoulder region, right    VISITS FROM START OF CARE:  Rxs Used: 1     [facility] Initial Evaluation  2021     Precautions/Restrictions/MD instructions: Evaluate and Treat for R shoulder pain    Therapist Assessment: Carli Michelle is a 70 year old female patient presenting to Physical Therapy with chronic R shoulder pain. Patient demonstrates pain with functional R shoulder IR, as well as poor scapular stability. Signs and symptoms are consistent with R shoulder impingement syndrome. These impairments limit their ability to reach behind back and dress without discomfort. Skilled PT services are necessary in order to reduce impairments and improve independent function.    Subjective History  Injury/Condition Details:  Presenting Complaint R shoulder pain   Onset Timing/Date 2020; MD referral on 2021   Mechanism A lot of repetitive movement during a stint of gardening.     Symptom Behavior Details    Primary Symptoms Constant symptoms; worsen with activity, pain (Location: R upper arm, front of arm, Quality: Aching/Throbbing), numbness/tingling into R arm   Worst Pain 8/10 prior to injection; 2/10 after injection   Symptom Provocators Reaching behind back, laying on R shoulder, arm rotation   Best Pain 2/10    Symptom Relievers Cortisone injection, ibuprofen    Time of day dependent? No   Recent symptom change? symptoms  improving since injection on 2021     PATIENTS NAME:  Carli Michelle   : 1950    Prior Testing/Intervention for current condition:  Prior Tests  x-ray on 21  Impression:  1. No acute osseous abnormality.  2. Mild glenohumeral degenerative change.     ALYSSA NAJERA MD (Joe)   Prior Treatment Injections: cortisone (extremely helpful)     Lifestyle & General Medical History:  Employment Retired   Usual physical activities  (within past year) Cross country skiing, gardening, chores around the home   Orthopaedic History  Frozen shoulder ~30 years ago on R    Medication  Supplements and vitamins   Notable medical history See Epic Chart   Patient goals Find exercises to improve shoulder discomfort   Patient Reported Health excellent     Red Flags: (Bold when present) - reviewed the following and denies  Malaise, unexplained weight loss, night pain, fever    OBJECTIVE  Posture: rounded shoulders and forward head posture  Palpation: no notable tenderness  Cervical Screen: no notable deficits    Shoulder: * if reproductive of patient's primary complaint   PROM L PROM R AROM L AROM R MMT L MMT R   Flex   162 160 4+ 4+   Abd   159 155 4+ 4*   IR   62 42 5 5   ER   120 115* 4+ 4+   Ext/IR   T5 T9*     Lower trap         Middle trap           Scapulothoraic Rhythm: increased medial and inferior R scapular border protrusion at rest, as well as with active shoulder elevation    Special tests:   L R   Impingement     Neer's     Hawkin's-Jose G     Cross-over test     Painful Arc of Motion - +   Internal impingement test - +   Pain with resisted ER - -   Pull Test     Labral     Traill's     Crank     Instability     Apprehension (anterior)     Relocation (anterior)     Anterior Load & Shift     Posterior Load & Shift     Multi-Directional Instability      Sulcus     Biceps      Speed's     Rotator Cuff Tear     Drop Arm - -   Belly Press     Lift off        ASSESSMENT/PLAN  Patient is a 70 year old  female with right side shoulder complaints.    Patient has the following significant findings with corresponding treatment plan.                Diagnosis 1:  Chronic R shoulder pain; signs and symptoms consistent with likely impingement of R rotator cuff musculature      Pain -  hot/cold therapy, US, electric stimulation, manual therapy, splint/taping/bracing/orthotics, self management, education and home program  Decreased ROM/flexibility - manual therapy, therapeutic exercise and home program  Decreased strength - therapeutic exercise, therapeutic activities and home program  Impaired muscle performance - neuro re-education and home program  Decreased function - therapeutic activities and home program  Impaired posture - neuro re-education and home program    Therapy Evaluation Codes:   1) History comprised of:   Personal factors that impact the plan of care:      None.    Comorbidity factors that impact the plan of care are:      None.     Medications impacting care: None.  2) Examination of Body Systems comprised of:   Body structures and functions that impact the plan of care:      Shoulder.   Activity limitations that impact the plan of care are:      Bathing, Dressing, Lifting, Sleeping and Laying down.  3) Clinical presentation characteristics are:   Stable/Uncomplicated.  4) Decision-Making    Low complexity using standardized patient assessment instrument and/or measureable assessment of functional outcome.  Cumulative Therapy Evaluation is: Low complexity.    Communication ability:  Patient appears to be able to clearly communicate and understand verbal and written communication and follow directions correctly.  Treatment Explanation - The following has been discussed with the patient:   RX ordered/plan of care  Anticipated outcomes  Possible risks and side effects  This patient would benefit from PT intervention to resume normal activities.   Rehab potential is good.  Frequency:  1 X week, once  "daily  Duration:  for 4 weeks; tapering to 2x/month for 1 month  Discharge Plan:  Achieve all LTG.  Independent in home treatment program.  Reach maximal therapeutic benefit.    Caregiver Signature/Credentials _____________________________ Date ________       Treating Provider: Reji Littlejohn, PT, DPT     I have reviewed and certified the need for these services and plan of treatment while under my care.      PHYSICIAN'S SIGNATURE: ____________________________________  Date___________                 Allie Hidalgo MD    Certification period:  Beginning of Cert date period: 02/08/21 to  End of Cert period date: 05/08/21     Functional Level Progress Report: Please see attached \"Goal Flow sheet for Functional level.\"    ____X____Continue Services or________ DC Services                Service dates: From  SOC Date: 02/08/21 date to present                         "

## 2021-02-08 NOTE — PROGRESS NOTES
[facility] Initial Evaluation  2/8/2021     Precautions/Restrictions/MD instructions: Evaluate and Treat for R shoulder pain    Therapist Assessment: Carli Michelle is a 70 year old female patient presenting to Physical Therapy with chronic R shoulder pain. Patient demonstrates pain with functional R shoulder IR, as well as poor scapular stability. Signs and symptoms are consistent with R shoulder impingement syndrome. These impairments limit their ability to reach behind back and dress without discomfort. Skilled PT services are necessary in order to reduce impairments and improve independent function.    Subjective History    Injury/Condition Details:  Presenting Complaint R shoulder pain   Onset Timing/Date April 30, 2020; MD referral on 2/2/2021   Mechanism A lot of repetitive movement during a stint of gardening.     Symptom Behavior Details    Primary Symptoms Constant symptoms; worsen with activity, pain (Location: R upper arm, front of arm, Quality: Aching/Throbbing), numbness/tingling into R arm   Worst Pain 8/10 prior to injection; 2/10 after injection   Symptom Provocators Reaching behind back, laying on R shoulder, arm rotation   Best Pain 2/10    Symptom Relievers Cortisone injection, ibuprofen    Time of day dependent? No   Recent symptom change? symptoms improving since injection on 2/2/2021     Prior Testing/Intervention for current condition:  Prior Tests  x-ray on 2/2/21  Impression:  1. No acute osseous abnormality.  2. Mild glenohumeral degenerative change.     ALYSSA NAJERA MD (Joe)   Prior Treatment Injections: cortisone (extremely helpful)     Lifestyle & General Medical History:  Employment Retired   Usual physical activities  (within past year) Cross country skiing, gardening, chores around the home   Orthopaedic History  Frozen shoulder ~30 years ago on R    Medication  Supplements and vitamins   Notable medical history See Epic Chart    Patient goals Find exercises to improve shoulder discomfort   Patient Reported Health excellent     Red Flags: (Bold when present) - reviewed the following and denies  Malaise, unexplained weight loss, night pain, fever    OBJECTIVE    Posture: rounded shoulders and forward head posture    Palpation: no notable tenderness    Cervical Screen: no notable deficits    Shoulder: * if reproductive of patient's primary complaint   PROM L PROM R AROM L AROM R MMT L MMT R   Flex   162 160 4+ 4+   Abd   159 155 4+ 4*   IR   62 42 5 5   ER   120 115* 4+ 4+   Ext/IR   T5 T9*     Lower trap         Middle trap           Scapulothoraic Rhythm: increased medial and inferior R scapular border protrusion at rest, as well as with active shoulder elevation    Special tests:   L R   Impingement     Neer's     Hawkin's-Jose G     Cross-over test     Painful Arc of Motion - +   Internal impingement test - +   Pain with resisted ER - -   Pull Test     Labral     Olney's     Crank     Instability     Apprehension (anterior)     Relocation (anterior)     Anterior Load & Shift     Posterior Load & Shift     Multi-Directional Instability      Sulcus     Biceps      Speed's     Rotator Cuff Tear     Drop Arm - -   Belly Press     Lift off          ASSESSMENT/PLAN  Patient is a 70 year old female with right side shoulder complaints.    Patient has the following significant findings with corresponding treatment plan.                Diagnosis 1:  Chronic R shoulder pain; signs and symptoms consistent with likely impingement of R rotator cuff musculature      Pain -  hot/cold therapy, US, electric stimulation, manual therapy, splint/taping/bracing/orthotics, self management, education and home program  Decreased ROM/flexibility - manual therapy, therapeutic exercise and home program  Decreased strength - therapeutic exercise, therapeutic activities and home program  Impaired muscle performance - neuro re-education and home program  Decreased  function - therapeutic activities and home program  Impaired posture - neuro re-education and home program    Therapy Evaluation Codes:   1) History comprised of:   Personal factors that impact the plan of care:      None.    Comorbidity factors that impact the plan of care are:      None.     Medications impacting care: None.  2) Examination of Body Systems comprised of:   Body structures and functions that impact the plan of care:      Shoulder.   Activity limitations that impact the plan of care are:      Bathing, Dressing, Lifting, Sleeping and Laying down.  3) Clinical presentation characteristics are:   Stable/Uncomplicated.  4) Decision-Making    Low complexity using standardized patient assessment instrument and/or measureable assessment of functional outcome.  Cumulative Therapy Evaluation is: Low complexity.    Previous and current functional limitations:  (See Goal Flow Sheet for this information)    Short term and Long term goals: (See Goal Flow Sheet for this information)     Communication ability:  Patient appears to be able to clearly communicate and understand verbal and written communication and follow directions correctly.  Treatment Explanation - The following has been discussed with the patient:   RX ordered/plan of care  Anticipated outcomes  Possible risks and side effects  This patient would benefit from PT intervention to resume normal activities.   Rehab potential is good.    Frequency:  1 X week, once daily  Duration:  for 4 weeks; tapering to 2x/month for 1 month  Discharge Plan:  Achieve all LTG.  Independent in home treatment program.  Reach maximal therapeutic benefit.    Please refer to the daily flowsheet for treatment today, total treatment time and time spent performing 1:1 timed codes.

## 2021-02-15 ENCOUNTER — THERAPY VISIT (OUTPATIENT)
Dept: PHYSICAL THERAPY | Facility: CLINIC | Age: 71
End: 2021-02-15
Payer: MEDICARE

## 2021-02-15 DIAGNOSIS — M75.41 IMPINGEMENT SYNDROME OF SHOULDER REGION, RIGHT: ICD-10-CM

## 2021-02-15 DIAGNOSIS — G89.29 CHRONIC RIGHT SHOULDER PAIN: ICD-10-CM

## 2021-02-15 DIAGNOSIS — M25.511 CHRONIC RIGHT SHOULDER PAIN: ICD-10-CM

## 2021-02-15 PROCEDURE — 97112 NEUROMUSCULAR REEDUCATION: CPT | Mod: GP | Performed by: PHYSICAL THERAPIST

## 2021-02-15 PROCEDURE — 97110 THERAPEUTIC EXERCISES: CPT | Mod: GP | Performed by: PHYSICAL THERAPIST

## 2021-02-22 ENCOUNTER — THERAPY VISIT (OUTPATIENT)
Dept: PHYSICAL THERAPY | Facility: CLINIC | Age: 71
End: 2021-02-22
Payer: MEDICARE

## 2021-02-22 DIAGNOSIS — M25.511 CHRONIC RIGHT SHOULDER PAIN: ICD-10-CM

## 2021-02-22 DIAGNOSIS — G89.29 CHRONIC RIGHT SHOULDER PAIN: ICD-10-CM

## 2021-02-22 DIAGNOSIS — M75.41 IMPINGEMENT SYNDROME OF SHOULDER REGION, RIGHT: ICD-10-CM

## 2021-02-22 PROCEDURE — 97112 NEUROMUSCULAR REEDUCATION: CPT | Mod: GP | Performed by: PHYSICAL THERAPIST

## 2021-02-22 PROCEDURE — 97110 THERAPEUTIC EXERCISES: CPT | Mod: GP | Performed by: PHYSICAL THERAPIST

## 2021-02-23 ENCOUNTER — OFFICE VISIT (OUTPATIENT)
Dept: FAMILY MEDICINE | Facility: CLINIC | Age: 71
End: 2021-02-23
Payer: MEDICARE

## 2021-02-23 VITALS — TEMPERATURE: 98.6 F | HEART RATE: 62 BPM | OXYGEN SATURATION: 100 %

## 2021-02-23 DIAGNOSIS — Z20.822 ENCOUNTER FOR LABORATORY TESTING FOR COVID-19 VIRUS: Primary | ICD-10-CM

## 2021-02-23 PROCEDURE — 99212 OFFICE O/P EST SF 10 MIN: CPT | Mod: CS | Performed by: FAMILY MEDICINE

## 2021-02-23 PROCEDURE — U0003 INFECTIOUS AGENT DETECTION BY NUCLEIC ACID (DNA OR RNA); SEVERE ACUTE RESPIRATORY SYNDROME CORONAVIRUS 2 (SARS-COV-2) (CORONAVIRUS DISEASE [COVID-19]), AMPLIFIED PROBE TECHNIQUE, MAKING USE OF HIGH THROUGHPUT TECHNOLOGIES AS DESCRIBED BY CMS-2020-01-R: HCPCS | Performed by: FAMILY MEDICINE

## 2021-02-23 PROCEDURE — U0005 INFEC AGEN DETEC AMPLI PROBE: HCPCS | Performed by: FAMILY MEDICINE

## 2021-02-23 NOTE — PATIENT INSTRUCTIONS
"Discharge Instructions for COVID-19 Patients  You have--or may have--COVID-19. Please follow the instructions listed below.   If you have a weakened immune system, discuss with your doctor any other actions you need to take.  How can I protect others?  If you have symptoms (fever, cough, body aches or trouble breathing):    Stay home and away from others (self-isolate) until:  ? Your other symptoms have resolved (gotten better). And   ? You've had no fever--and no medicine that reduces fever--for 1 full day (24 hours). And   ? At least 10 days have passed since your symptoms started. (You may need to wait 20 days. Follow the advice of your care team.)  If you don't show symptoms, but testing showed that you have COVID-19:    Stay home and away from others (self-isolate) until at least 10 days have passed since the date of your first positive COVID-19 test.  During this time    Stay in your own room, even for meals. Use your own bathroom if you can.    Stay away from others in your home. No hugging, kissing or shaking hands. No visitors.    Don't go to work, school or anywhere else.    Clean \"high touch\" surfaces often (doorknobs, counters, handles). Use household cleaning spray or wipes.    You'll find a full list of  on the EPA website: www.epa.gov/pesticide-registration/list-n-disinfectants-use-against-sars-cov-2.    Cover your mouth and nose with a mask or other face covering to avoid spreading germs.    Wash your hands and face often. Use soap and water.    Caregivers in these groups are at risk for severe illness due to COVID-19:  ? People 65 years and older  ? People who live in a nursing home or long-term care facility  ? People with chronic disease (lung, heart, cancer, diabetes, kidney, liver, immunologic)  ? People who have a weakened immune system, including those who:    Are in cancer treatment    Take medicine that weakens the immune system, such as corticosteroids    Had a bone marrow or organ " transplant    Have an immune deficiency    Have poorly controlled HIV or AIDS    Are obese (body mass index of 40 or higher)    Smoke regularly    Caregivers should wear gloves while washing dishes, handling laundry and cleaning bedrooms and bathrooms.    Use caution when washing and drying laundry: Don't shake dirty laundry and use the warmest water setting that you can.    For more tips on managing your health at home, go to www.cdc.gov/coronavirus/2019-ncov/downloads/10Things.pdf.  How can I take care of myself at home?  1. Get lots of rest. Drink extra fluids (unless a doctor has told you not to).  2. Take Tylenol (acetaminophen) for fever or pain. If you have liver or kidney problems, ask your family doctor if it's okay to take Tylenol.   Adults can take either:   ? 650 mg (two 325 mg pills) every 4 to 6 hours, or   ? 1,000 mg (two 500 mg pills) every 8 hours as needed.  ? Note: Don't take more than 3,000 mg in one day. Acetaminophen is found in many medicines (both prescribed and over-the-counter medicines). Read all labels to be sure you don't take too much.   For children, check the Tylenol bottle for the right dose. The dose is based on the child's age or weight.  3. If you have other health problems (like cancer, heart failure, an organ transplant or severe kidney disease): Call your specialty clinic if you don't feel better in the next 2 days.  4. Know when to call 911. Emergency warning signs include:  ? Trouble breathing or shortness of breath  ? Pain or pressure in the chest that doesn't go away  ? Feeling confused like you haven't felt before, or not being able to wake up  ? Bluish-colored lips or face  5. Your doctor may have prescribed a blood thinner medicine. Follow their instructions.  Where can I get more information?     [facility] - About COVID-19:   [email or web address]    CDC - What to Do If You're Sick:  www.cdc.gov/coronavirus/2019-ncov/about/steps-when-sick.html    CDC - Ending Home Isolation: www.cdc.gov/coronavirus/2019-ncov/hcp/disposition-in-home-patients.html    CDC - Caring for Someone: www.cdc.gov/coronavirus/2019-ncov/if-you-are-sick/care-for-someone.html    [facility] - Interim Guidance for Hospital Discharge to Home: www.health.[facility].[place]./diseases/coronavirus/hcp/hospdischarge.pdf    Below are the COVID-19 hotlines at the [place] Department of Health ([facility]). Interpreters are available.  ? For health questions: Call 055-129-4281 or 1-472.859.9610 (7 a.m. to 7 p.m.)  ? For questions about schools and childcare: Call 022-141-9083 or 1-170.488.3646 (7 a.m. to 7 p.m.)    For informational purposes only. Not to replace the advice of your health care provider. Clinically reviewed by Dr. Michael Poon.   Copyright   2020 [facility]. All rights reserved. [employer] 464703 - REV 01/05/21.  \    Covid vaccine information  We are working hard to begin vaccinating more people against COVID-19. Currently, we are only vaccinating individuals age 75 and older and Phase 1a workers - healthcare workers who are unable to do their job remotely. Vaccine availability is very limited.    If you are 75 or older, or a healthcare worker who is unable to do your job remotely, please log in to [employer] using this link to see if we have an open appointment and schedule an appointment.  If there are no appointments left, you will be unable to schedule and need to check back later.  If you are a healthcare worker, you will be asked to provide proof of employment at your appointment. If you cannot, you will be turned away.    Vaccine appointments are being added as they become available. Please check your [employer] account frequently for availability. If you have technical difficulty using [employer], call [phone] for assistance.    You can learn more about the state's phased approach to administering the vaccine, with details  on each phase, https://www.health.[facility].[place]./diseases/coronavirus/vaccine/plan.html.      As vaccine supply increases and we are able to open appointments to more groups, we will share that information on our website [email or web address]. Check this website to stay up to date on COVID-19 vaccination information.

## 2021-02-23 NOTE — PROGRESS NOTES
Assessment & Plan     Encounter for laboratory testing for COVID-19 virus  Test for travel  No symptoms.  No contacts.    Discussed vaccines    - Asymptomatic COVID-19 Virus (Coronavirus) by PCR      Andres Noel MD  [facility] ARISTIDES Boyce is a 70 year old who presents for the following health issues     HPI     Test for travel  No symptoms.  No contacts.          Review of Systems         Objective    Pulse 62   Temp 98.6  F (37  C)   SpO2 100%   There is no height or weight on file to calculate BMI.     Physical Exam   GENERAL: healthy, alert and no distress  RESP: no respiratory distress

## 2021-02-24 LAB
LABORATORY COMMENT REPORT: NORMAL
SARS-COV-2 RNA RESP QL NAA+PROBE: NEGATIVE
SARS-COV-2 RNA RESP QL NAA+PROBE: NORMAL
SPECIMEN SOURCE: NORMAL
SPECIMEN SOURCE: NORMAL

## 2021-03-12 ENCOUNTER — OFFICE VISIT (OUTPATIENT)
Dept: FAMILY MEDICINE | Facility: CLINIC | Age: 71
End: 2021-03-12
Payer: MEDICARE

## 2021-03-12 VITALS
OXYGEN SATURATION: 97 % | HEART RATE: 67 BPM | TEMPERATURE: 98.3 F | DIASTOLIC BLOOD PRESSURE: 80 MMHG | SYSTOLIC BLOOD PRESSURE: 146 MMHG

## 2021-03-12 DIAGNOSIS — Z20.822 ENCOUNTER FOR LABORATORY TESTING FOR COVID-19 VIRUS: Primary | ICD-10-CM

## 2021-03-12 PROCEDURE — 99212 OFFICE O/P EST SF 10 MIN: CPT | Mod: CS | Performed by: STUDENT IN AN ORGANIZED HEALTH CARE EDUCATION/TRAINING PROGRAM

## 2021-03-12 PROCEDURE — U0003 INFECTIOUS AGENT DETECTION BY NUCLEIC ACID (DNA OR RNA); SEVERE ACUTE RESPIRATORY SYNDROME CORONAVIRUS 2 (SARS-COV-2) (CORONAVIRUS DISEASE [COVID-19]), AMPLIFIED PROBE TECHNIQUE, MAKING USE OF HIGH THROUGHPUT TECHNOLOGIES AS DESCRIBED BY CMS-2020-01-R: HCPCS | Performed by: FAMILY MEDICINE

## 2021-03-12 PROCEDURE — U0005 INFEC AGEN DETEC AMPLI PROBE: HCPCS | Performed by: FAMILY MEDICINE

## 2021-03-12 NOTE — PROGRESS NOTES
Assessment & Plan     Encounter for laboratory testing for COVID-19 virus  Asymptomatic, tested today after travel. Will call with results. Had 1st dose of Moderna vaccine 2 weeks ago so will update chart.  - Asymptomatic COVID-19 Virus (Coronavirus) by PCR      No follow-ups on file.    Norbert Campbell MD  [facility] ARISTIDES Boyce is a 70 year old who presents for the following health issues  accompanied by her spouse:    HPI     Here for COVID testing today after travel back to [place] from [place]. Arrived back here 3 days ago. Currently asymptomatic, afebrile, and denies shortness of breath, cough, loss of taste sensation. Partner is also asymptomatic.         Objective    BP (!) 146/80   Pulse 67   Temp 98.3  F (36.8  C)   SpO2 97%   There is no height or weight on file to calculate BMI.  Physical Exam  Constitutional:       General: She is not in acute distress.     Appearance: She is well-developed.   HENT:      Head: Normocephalic and atraumatic.   Eyes:      General: No scleral icterus.     Conjunctiva/sclera: Conjunctivae normal.   Cardiovascular:      Rate and Rhythm: Normal rate.   Pulmonary:      Effort: Pulmonary effort is normal. No respiratory distress.   Skin:     General: Skin is warm and dry.      Findings: No erythema.   Neurological:      Mental Status: She is alert and oriented to person, place, and time.

## 2021-03-13 LAB
SARS-COV-2 RNA RESP QL NAA+PROBE: NOT DETECTED
SPECIMEN SOURCE: NORMAL

## 2021-03-14 NOTE — PROGRESS NOTES
Preceptor Attestation:   Patient seen, evaluated and discussed with the resident. I have verified the content of the note, which accurately reflects my assessment of the patient and the plan of care.   Supervising Physician:  Ernesto Kelley MD

## 2021-03-15 ENCOUNTER — THERAPY VISIT (OUTPATIENT)
Dept: PHYSICAL THERAPY | Facility: CLINIC | Age: 71
End: 2021-03-15
Payer: MEDICARE

## 2021-03-15 DIAGNOSIS — M75.41 IMPINGEMENT SYNDROME OF SHOULDER REGION, RIGHT: ICD-10-CM

## 2021-03-15 DIAGNOSIS — M25.511 CHRONIC RIGHT SHOULDER PAIN: ICD-10-CM

## 2021-03-15 DIAGNOSIS — G89.29 CHRONIC RIGHT SHOULDER PAIN: ICD-10-CM

## 2021-03-15 PROCEDURE — 97110 THERAPEUTIC EXERCISES: CPT | Mod: GP | Performed by: PHYSICAL THERAPIST

## 2021-03-15 NOTE — PROGRESS NOTES
PROGRESS  REPORT    Progress reporting period is from 2/8/2021 to 3/15/2021.       SUBJECTIVE  Subjective changes noted by patient:  Pt reports her R shoulder is doing great overall. She did not have any issues during her recent vacation to [place]. Exercises have continued to go well at home.   Current pain level is 0/10.     Initial Pain level: 4/10.   Changes in function:  Yes (See Goal flowsheet attached for changes in current functional level)  Adverse reaction to treatment or activity: None    OBJECTIVE  Changes noted in objective findings:  Yes, improved R shoulder AROM and strength, with no pain elicited during objective testing.    Shoulder: * if reproductive of patient's primary complaint   PROM L PROM R AROM L AROM R MMT L MMT R   Flex   170 165 5 5   Abd   165 165 5 5   IR   55 55 5 5   ER   115 115 5 5   Ext/IR   T7 T6     Lower trap         Middle trap             Scapulothoracic mobility: mild medial and inferior border protrusion with active R shoulder elevation; no reports of pain    ASSESSMENT/PLAN  Updated problem list and treatment plan: Diagnosis 1:  R shoulder pain; signs and symptoms consistent with impingement syndrome  Pain -  self management and home program  Impaired muscle performance - home program  STG/LTGs have been met or progress has been made towards goals:  Yes (See Goal flow sheet completed today.)  Assessment of Progress: The patient's condition is improving.  The patient has met all of their long term goals.  Self Management Plans:  Patient is independent in a home treatment program.  Patient is independent in self management of symptoms.  I have re-evaluated this patient and find that the nature, scope, duration and intensity of the therapy is appropriate for the medical condition of the patient.  Carli continues to require the following intervention to meet STG and LTG's:  PT intervention is no longer required to meet STG/LTG.    Recommendations:  This patient is ready to be  discharged from therapy and continue their home treatment program.    Please refer to the daily flowsheet for treatment today, total treatment time and time spent performing 1:1 timed codes.

## 2021-03-21 ENCOUNTER — HEALTH MAINTENANCE LETTER (OUTPATIENT)
Age: 71
End: 2021-03-21

## 2021-03-24 ENCOUNTER — DOCUMENTATION ONLY (OUTPATIENT)
Dept: FAMILY MEDICINE | Facility: CLINIC | Age: 71
End: 2021-03-24

## 2021-03-24 NOTE — PROGRESS NOTES
"When opening a documentation only encounter, be sure to enter in \"Chief Complaint\" Forms and in \" Comments\" Title of form, description if needed.    Carli is a 70 year old  female  Form received via: Fax  Form now resides in: Provider Ready    Carolina Cordova MA     Form has been completed by provider.     Form sent out via: Fax to [facility] at Fax Number: [phone]  Patient informed: No, Reason:n/a  Output date: March 31, 2021    Carolina Cordova MA      **Please close the encounter**                      "

## 2021-04-01 ENCOUNTER — OFFICE VISIT (OUTPATIENT)
Dept: FAMILY MEDICINE | Facility: CLINIC | Age: 71
End: 2021-04-01
Payer: MEDICARE

## 2021-04-01 VITALS
OXYGEN SATURATION: 98 % | SYSTOLIC BLOOD PRESSURE: 152 MMHG | RESPIRATION RATE: 16 BRPM | BODY MASS INDEX: 22.97 KG/M2 | WEIGHT: 117 LBS | DIASTOLIC BLOOD PRESSURE: 81 MMHG | HEIGHT: 60 IN | TEMPERATURE: 98.6 F | HEART RATE: 61 BPM

## 2021-04-01 DIAGNOSIS — Z71.89 ADVANCED DIRECTIVES, COUNSELING/DISCUSSION: ICD-10-CM

## 2021-04-01 DIAGNOSIS — R03.0 BORDERLINE HYPERTENSION: ICD-10-CM

## 2021-04-01 DIAGNOSIS — Z00.00 MEDICARE ANNUAL WELLNESS VISIT, SUBSEQUENT: Primary | ICD-10-CM

## 2021-04-01 PROCEDURE — G0439 PPPS, SUBSEQ VISIT: HCPCS | Performed by: FAMILY MEDICINE

## 2021-04-01 ASSESSMENT — MIFFLIN-ST. JEOR: SCORE: 961.59

## 2021-04-01 NOTE — PROGRESS NOTES
>64 year old Wellness Visit ( Medicare etc)         HPI       This 70 year old female presents as an established patient  Houston Menchaca who presents for a  Annual Wellness Exam.    Other issues patient wants to address today:     - Bp - has recently purchased a home blood pressure monitor. Does not want medications. Has had a few stressors in her life lately. Wants to get back to classes at the [employer].   -  Cough and persistently runny nose - exposed to second hand smoking from her partner using the pipe. Wonders if she needs to do anything about it.  - Had one too many cups of coffee which makes her stools loose. Sometimes had to rush to the restroom. Not a problem now but will report back when it becomes an issue.   - Early macular signs in eyes. Is currently managed by her Ophthalmologist. No visual field deficits or problems in her vision.     Visual Acuity:  Right Eye: 20/40   Left Eye: 20/30  Both Eyes: 20/40    Patient Active Problem List   Diagnosis     Onychomycosis     Pain of left thumb     Cervical radiculopathy       Past Medical History:   Diagnosis Date     Atrophic vaginitis         Family History   Problem Relation Age of Onset     Breast Cancer Mother 40     Glaucoma Mother      Alzheimer Disease Father 82     Diabetes Father      Cancer Maternal Grandmother 68        unknown cancer     Breast Cancer Maternal Aunt      Glaucoma Maternal Aunt          Problem List, Family History and past Medical History reviewed and unchanged/updated.       Health risk Assessment/ Review of Systems:          Constitutional:   Fevers or night sweats?  NO      Eyes:   Vision problems?  NO            Hearing:  Do you feel you have hearing loss?  NO  Cardiovascular:  Chest pain, palpitations, or pain with walking?  NO        Respiratory:   Breathing problems or cough?  NO    :   Difficulty controlling urination?  NO        Musculoskeletal:   Stiffness or sore joints?  NO            Skin:   concerning lesions or moles?   NO           Nervous System:   loss of strength or sensation,  numbness or tingling,  tremor,  dizziness,  headache?  NO         Mental Health:   depression or anxiety,  sleep problems?  NO   Cognition:  Memory problems?  NO       Weight Loss: Have you lost 10 or more pounds unintentionally in the previous year?  NO  Energy: How much of the time during the past 3 weeks did you feel tired?   (check one of the following):   ?  All of the time  ? Most of the time  ? Some of the time  ? A little of the time  ? None of the Time    PHQ-2 Score:   PHQ-2 ( 1999 Pf[employer]) 4/1/2021 2/2/2021   Q1: Little interest or pleasure in doing things 0 0   Q2: Feeling down, depressed or hopeless 0 0   PHQ-2 Score 0 0       PHQ-9 Score:   [facility] Follow-up to Located within [facility] 12/9/2016   PHQ-9 9. Suicide Ideation past 2 weeks Not at all     Medical Care:     What other specialists or organizations are involved in your medical care?      Patient Care Team       Relationship Specialty Notifications Start End    Houston Menchaca MD PCP - General Family Practice  4/8/11     Phone: [phone] Fax: [phone]         [address] [place] [id number]    Houston Menchaca MD MD Family Practice  11/18/16     referring to [facility]    Phone: [phone] Fax: [phone]         [address] [place] [phone]    Frank Diamond MD MD Colon and Rectal Surgery  11/18/16     Phone: [phone] Fax: [phone]         [address] [place] [address] [address] [facility] [address]    Houston Menchaca MD Assigned PCP   11/22/20     Phone: [phone] Fax: [phone]         [address] [place] [phone]          Do you have an advanced directive? Yes      Social History / Home Safety     Social History     Tobacco Use     Smoking status: Never Smoker     Smokeless tobacco: Never Used   Substance Use Topics     Alcohol use: Yes     Alcohol/week: 5.8 - 7.5 standard drinks     Types: 7 - 9 Standard drinks or equivalent per week      Marital Status:  Who lives in your household? Living with Ex   Does your home have any of the following safety concerns? Loose rugs in the hallway, no grab bars in the bathroom, no handrails on the stairs or have poorly lit areas?  No   Do you feel threatened or controlled by a partner, ex-partner or anyone in your life? {Yes No   Has anyone hurt you physically, for example by pushing, hitting, slapping or kicking you   or forcing you to have sex? No       Functional Status     Do you need help with dressing yourself, bathing, or walking?No   Do you need help with the phone, transportation, shopping, preparing meals, housework, laundry, medications or managing money?No   By yourself and not using aids, do you have any difficulty walking up 10 steps  without resting? No   By yourself and not using aids, do you have any difficulty walking several hundred yards? No              Risk Behaviors and Healthy Habits     History   Smoking Status     Never Smoker   Smokeless Tobacco     Never Used     How many servings of fruits and vegetables do you eat a day? 2-3  Exercise:walking  and swimming 6-7 days/week for an average of 30-45 minutes  Do you frequently drive without a seatbelt? No   History   Smoking Status     Never Smoker   Smokeless Tobacco     Never Used     Do you use any other drugs? No       Do you use alcohol?Yes      Functional Ability   Was the patient's timed Up & Go test (Get up from chair walk, 10 feet turn, return to chair and sit down) unsteady or longer than 10 seconds? No     Fall risk:     1. Have you fallen two or more times in the past year? No   2. Have you fallen and had an injury in the past year?  No         Evaulation of Cognitive Function     Family member/caregiver input: Normal    1) Repeat 3 items (Leader, Season, Table)    2) Clock draw: NORMAL  3) 3 item recall: Recalls 3 objects  Results: 3 items recalled: COGNITIVE IMPAIRMENT LESS LIKELY    Mini-CogTM Copyright S  Rodríguez. Licensed by the author for use in [facility]; reprinted with permission (radha@[facility]). All rights reserved.            Other Assessments:     # High blood pressure measurements at clinic   Patient has had several bp measurements in the 140s-150s. Patient has recently procured a home bp monitor and will measure her bps at home and let us know.     #Cough and passive smoke inhalation   Adequately ventilate your surrounding. No screening recommended at this time for secondary smoke exposure.        CV Risk based on Pooled Cohort Risk (consider assessing every 4-6 years; consider statin in patients with 10-yr risk > 7.5%):    The 10-year ASCVD risk score (Bradly URRUTIA JrIrvin, et al., 2013) is: 12.2%    Values used to calculate the score:      Age: 70 years      Sex: Female      Is Non- : No      Diabetic: No      Tobacco smoker: No      Systolic Blood Pressure: 146 mmHg      Is BP treated: No      HDL Cholesterol: 94.5 mg/dL      Total Cholesterol: 185.2 mg/dL    Advance Directives: Discussed with patient and family as appropriate.  Has patient completed advance directives? Yes, advance care planning is on file.        Immunization History   Administered Date(s) Administered     COVID-19,PF,Moderna 02/25/2021     HEPA 05/12/1997, 12/01/1997     HepB 09/30/1997, 06/12/2006, 12/11/2006     Influenza (H1N1) 01/07/2010     Influenza (High Dose) 3 valent vaccine 10/11/2016, 09/30/2018, 09/02/2020     Influenza (IIV3) PF 11/14/1996, 11/07/1997, 10/17/2006, 11/06/2007, 11/19/2008, 09/29/2011, 09/17/2014     Influenza Vaccine IM > 6 months Valent IIV4 10/21/2015, 10/17/2019     OPV, trivalent, live 08/05/1958, 06/22/1960, 07/27/1966     Pneumo Conj 13-V (2010&after) 01/30/2020     Pneumococcal 23 valent 02/02/2021     Small Pox 12/21/1966     TD (ADULT, 7+) 06/22/1960, 06/19/1963, 07/27/1966, 07/19/2002     TDAP Vaccine (Boostrix) 05/07/2013     Tetanus 01/01/1969     Typhoid Oral  07/11/2006, 07/11/2006, 07/13/2006, 07/15/2006, 07/17/2006     Yellow Fever 07/11/2006     Zoster vaccine, live 04/20/2010, 02/25/2019, 04/30/2019     Reviewed Immunization Record Today    Physical Exam     Vitals: There were no vitals taken for this visit.  BMI= There is no height or weight on file to calculate BMI.  GENERAL APPEARANCE: alert and no distress  HENT: ear canals patent and TM's normal; mouth without ulcers or lesions; and oral mucous membranes moist  Hearing loss screen:   Normal   DENTITION:  Good repair?  yes  RESP: lungs clear to auscultation - no rales, rhonchi or wheezes  CV: regular rates and rhythm, no murmur, click or rub, no peripheral edema and peripheral pulses strong  SKIN: no suspicious lesions or rashes  NEURO: Normal strength and tone  MENTAL STATUS EXAM  Appearance: appropriate  Attitude: cooperative  Behavior: normal  Eye Contact: normal  Speech: normal  Orientation: oreinted to person , place, time and situation  Mood:  euthymic  Affect: Mood Congruent  Thought Process: clear         Assessment and Plan         Welcome to Medicare Preventive Visit / Initial Medicare Annual Wellness Exam - reviewed Preventive Services and Plan form with patient as specified in Patient Instructions.    1. Medicare annual wellness visit, subsequent  Reviewed Medicare wellness guidelines patient qualifies for statins though is not interested in the this time.  Otherwise patient is doing well no other pending recommended screening    2. Borderline hypertension  Blood pressure has been mild elevation wit advised h in office though generally normal at home encourage continue monitoring and advised if blood pressure greater than 135/85 at home to recontact us otherwise follow-up in 1 year    Advanced directives were discussed and were unchanged and on file in our records    Options for treatment and follow-up care were reviewed with the Carli Michelle and/or guardian engaged in the decision making  process and verbalized understanding of the options discussed and agreed with the final plan.    Houston Menchaca MD

## 2021-04-01 NOTE — PATIENT INSTRUCTIONS
Patient Education   Here is the plan from today's visit    1. Medicare annual wellness visit, subsequent  You are up-to-date on all recommended preventive maintenance plans.  You do meet criteria to start a statin medication if you would like to this could have the effect of reducing your risk of heart attacks and strokes..    2. Borderline hypertension  As we discussed a goal for home blood pressure is less than 135/85 if you are consistently above this consider contacting me by my chart.    3. Advanced directives, counseling/discussion  As we discussed there is no change in your advanced directives or counseling      Please call or return to clinic if your symptoms don't go away.    Follow up plan  Return in about 1 year (around 4/1/2022) for CPE/Wellness Visit.    Thank you for coming to [facility] today.  Lab Testing:  **If you had lab testing today and your results are reassuring or normal they will be mailed to you or sent through [facility] within 7 days.   **If the lab tests need quick action we will call you with the results.  **If you are having labs done on a different day, please call [phone] to schedule at [facility] or [phone] for other [facility] locations.   The phone number we will call with results is # [phone] (home) . If this is not the best number please call our clinic and change the number.  Medication Refills:  If you need any refills please call your pharmacy and they will contact us.   If you need to  your refill at a new pharmacy, please contact the new pharmacy directly. The new pharmacy will help you get your medications transferred faster.   Scheduling:  If you have any concerns about today's visit or wish to schedule another appointment please call our office during normal business hours [phone] (8-5:00 M-F)  If a referral was made to a [facility] and you don't get a call from central scheduling please call  [phone].  If a Mammogram was ordered for you at [facility] call [phone] to schedule or change your appointment.  If you had an XRay/CT/Ultrasound/MRI ordered the number is [phone] to schedule or change your radiology appointment.   Medical Concerns:  If you have urgent medical concerns please call [phone] at any time of the day.    Houston Menchaca MD

## 2021-07-29 ENCOUNTER — HOSPITAL ENCOUNTER (OUTPATIENT)
Dept: MAMMOGRAPHY | Facility: CLINIC | Age: 71
Discharge: HOME | End: 2021-07-29
Attending: FAMILY MEDICINE | Admitting: FAMILY MEDICINE
Payer: MEDICARE

## 2021-07-29 DIAGNOSIS — Z12.31 VISIT FOR SCREENING MAMMOGRAM: ICD-10-CM

## 2021-07-29 PROCEDURE — 77063 BREAST TOMOSYNTHESIS BI: CPT

## 2021-08-15 NOTE — PROGRESS NOTES
[facility] Initial Evaluation    Subjective:  Carli Michelle is a 71 year old female.    Patient s chief complaints: R shoulder pain.    Condition occurred due to gardening, pain came on within the past 1-2 years, had injection and a few visits of PT last winter with benefit, but symptoms recurred in June with resumption of gardening activities  Date of Onset: around 6/1/21  Location of symptoms is anterior shoulder/biceps .  Symptoms other than pain include: numbness when reading in bed  Quality of pain is dull and frequency is intermittent.    Pain dependence on time of day is: worse at night.   Pain rating is: 2-6/10.    Symptoms are exacerbated by: reaching behind back, wake at night and affects sleep, laying on it.  After gardening activities in general.  Symptoms are relieved by:  Prior injection and PT, nothing recently.    Progression of symptoms is that symptoms are:  Recently recurred so gradually worse.    Imaging/Special tests include: Impression:  1. No acute osseous abnormality.  2. Mild glenohumeral degenerative change.     Previous treatments include: injection, PT.   Patient reports that general health is: excellent.     Pertinent medical history includes:    Past Medical History:   Diagnosis Date     Atrophic vaginitis      Medical allergies includes:   Sulfa drugs  Surgical history includes:   Past Surgical History:   Procedure Laterality Date     APPENDECTOMY  3/2012     Current medications include:     Current Outpatient Medications:      calcium carbonate (TUMS) 500 MG chewable tablet, Take 1 chew tab by mouth daily, Disp: , Rfl:      Multiple Vitamins-Minerals (MULTIVITAMIN WOMEN 50+ PO), Take 1 tablet by mouth daily, Disp: , Rfl:      Multiple Vitamins-Minerals (PRESERVISION AREDS 2 PO), Take 1 capsule by mouth daily, Disp: , Rfl:     Current occupation is: retired  Work status is: retired  Primary job tasks include: none, gardens and walks  Barriers include:  none    Red flags include: none    Patient's expectations for therapy include: reduce pain, get maintenance program.    SHOULDER:    Cervical Screen: WNL, in loss B rot; no reproduction of pain.    Shoulder:   PROM L PROM R AROM L AROM R MMT L MMT R   Flex   WNL +120 5 5 with pain   Abd   WNL +90 5 5 with pain   Full Can     5 5   Empty Can     5 5   IR   WNL WNL 5 5   ER   WNL  end range pain 5 4 with pain   Ext/IR   T5 T7 trace pain       Scapulothoraic Rhythm: R inferior angle winging on descent from abduction but not flexion.     Palpation: tender subscap, not supra/infra/biceps    Special tests:   L R   Impingement     Neer's  negative   Hawkin's-Jose G  positive   Coracoid Impingement  positive   Internal impingement     Labral     Anterior Slide  negative   Enville's  Pain, no click   Crank     Instability     Apprehension (anterior)     Relocation (anterior)     Anterior Load & Shift     Posterior Load & Shift     Posterior instability (with 90 degrees flex)     Multi-Directional Instability      Sulcus     Biceps      Speed's  positive   Rotator Cuff Tear     Drop Arm     Belly Press  negative   Lift off   Pain, but able     Assessment/Plan:    Patient is a 71 year old female with right side shoulder complaints.    Patient has the following significant findings with corresponding treatment plan.                Diagnosis 1:  R shoulder impingement, likely infra/teres, bursa, with some biceps tendinopathy    Pain -  hot/cold therapy, US, electric stimulation, manual therapy and directional preference exercise  Decreased ROM/flexibility - manual therapy and therapeutic exercise  Decreased strength - therapeutic exercise and therapeutic activities  Impaired balance - neuro re-education and therapeutic activities  Decreased proprioception - neuro re-education and therapeutic activities  Edema - electric stimulation and cold therapy  Impaired gait - gait training  Decreased function - therapeutic  activities  Impaired posture - neuro re-education    Therapy Evaluation Codes:   1) History comprised of:   Personal factors that impact the plan of care:      None.    Comorbidity factors that impact the plan of care are:      None.     Medications impacting care: None.  2) Examination of Body Systems comprised of:   Body structures and functions that impact the plan of care:      Shoulder.   Activity limitations that impact the plan of care are:      Dressing, Lifting, Sleeping and Laying down.  3) Clinical presentation characteristics are:   Stable/Uncomplicated.  4) Decision-Making    Low complexity using standardized patient assessment instrument and/or measureable assessment of functional outcome.  Cumulative Therapy Evaluation is: Low complexity.    Previous and current functional limitations:  (See Goal Flow Sheet for this information)    Short term and Long term goals: (See Goal Flow Sheet for this information)     Communication ability:  Patient appears to be able to clearly communicate and understand verbal and written communication and follow directions correctly.  Treatment Explanation - The following has been discussed with the patient:   RX ordered/plan of care  Anticipated outcomes  Possible risks and side effects  This patient would benefit from PT intervention to resume normal activities.   Rehab potential is good.    Frequency:  1 X week, once daily  Duration:  for 6 weeks  Discharge Plan:  Achieve all LTG.  Independent in home treatment program.  Reach maximal therapeutic benefit.    Please refer to the daily flowsheet for treatment today, total treatment time and time spent performing 1:1 timed codes.

## 2021-08-16 ENCOUNTER — THERAPY VISIT (OUTPATIENT)
Dept: PHYSICAL THERAPY | Facility: CLINIC | Age: 71
End: 2021-08-16
Attending: FAMILY MEDICINE
Payer: MEDICARE

## 2021-08-16 DIAGNOSIS — M75.41 IMPINGEMENT SYNDROME OF SHOULDER REGION, RIGHT: ICD-10-CM

## 2021-08-16 PROCEDURE — 97110 THERAPEUTIC EXERCISES: CPT | Mod: GP | Performed by: PHYSICAL THERAPIST

## 2021-08-16 PROCEDURE — 97161 PT EVAL LOW COMPLEX 20 MIN: CPT | Mod: GP | Performed by: PHYSICAL THERAPIST

## 2021-08-16 NOTE — LETTER
DEPARTMENT OF HEALTH AND HUMAN SERVICES  [facility] MEDICA[facility]    PLAN/UPDATED PLAN OF PROGRESS FOR OUTPATIENT REHABILITATION      PATIENTS NAME:  Carli Michelle   : 1950    PROVIDER NUMBER:    [id number]    HICN:  [id number]     PROVIDER NAME: [facility]    MEDICAL RECORD NUMBER: [id number]     START OF CARE DATE:  SOC Date: 21   TYPE:  PT    PRIMARY/TREATMENT DIAGNOSIS: (Pertinent Medical Diagnosis)  Impingement syndrome of shoulder region, right    VISITS FROM START OF CARE:  Rxs Used: 1     [facility] Initial Evaluation    Subjective:  Carli Michelle is a 71 year old female.    Patient s chief complaints: R shoulder pain.    Condition occurred due to gardening, pain came on within the past 1-2 years, had injection and a few visits of PT last winter with benefit, but symptoms recurred in  with resumption of gardening activities  Date of Onset: around 21  Location of symptoms is anterior shoulder/biceps .  Symptoms other than pain include: numbness when reading in bed  Quality of pain is dull and frequency is intermittent.    Pain dependence on time of day is: worse at night.   Pain rating is: 2-6/10.    Symptoms are exacerbated by: reaching behind back, wake at night and affects sleep, laying on it.  After gardening activities in general.  Symptoms are relieved by:  Prior injection and PT, nothing recently.    Progression of symptoms is that symptoms are:  Recently recurred so gradually worse.    Imaging/Special tests include: Impression:  1. No acute osseous abnormality.  2. Mild glenohumeral degenerative change.     Previous treatments include: injection, PT.   Patient reports that general health is: excellent.     Pertinent medical history includes:    Past Medical History:   Diagnosis Date     Atrophic vaginitis        PATIENTS NAME:  Carli Michelle   : 1950    Medical allergies  includes:   Sulfa drugs  Surgical history includes:   Past Surgical History:   Procedure Laterality Date     APPENDECTOMY  3/2012     Current medications include:     Current Outpatient Medications:      calcium carbonate (TUMS) 500 MG chewable tablet, Take 1 chew tab by mouth daily, Disp: , Rfl:      Multiple Vitamins-Minerals (MULTIVITAMIN WOMEN 50+ PO), Take 1 tablet by mouth daily, Disp: , Rfl:      Multiple Vitamins-Minerals (PRESERVISION AREDS 2 PO), Take 1 capsule by mouth daily, Disp: , Rfl:     Current occupation is: retired  Work status is: retired  Primary job tasks include: none, gardens and walks  Barriers include: none  Red flags include: none    Patient's expectations for therapy include: reduce pain, get maintenance program.    SHOULDER:  Cervical Screen: WNL, in loss B rot; no reproduction of pain.    Shoulder:   PROM L PROM R AROM L AROM R MMT L MMT R   Flex   WNL +120 5 5 with pain   Abd   WNL +90 5 5 with pain   Full Can     5 5   Empty Can     5 5   IR   WNL WNL 5 5   ER   WNL  end range pain 5 4 with pain   Ext/IR   T5 T7 trace pain       Scapulothoraic Rhythm: R inferior angle winging on descent from abduction but not flexion.     Palpation: tender subscap, not supra/infra/biceps              PATIENTS NAME:  Carli Michelle   : 1950    Special tests:   L R   Impingement     Neer's  negative   Hawkin's-Jose G  positive   Coracoid Impingement  positive   Internal impingement     Labral     Anterior Slide  negative   Bessemer's  Pain, no click   Crank     Instability     Apprehension (anterior)     Relocation (anterior)     Anterior Load & Shift     Posterior Load & Shift     Posterior instability (with 90 degrees flex)     Multi-Directional Instability      Sulcus     Biceps      Speed's  positive   Rotator Cuff Tear     Drop Arm     Belly Press  negative   Lift off   Pain, but able     Assessment/Plan:    Patient is a 71 year old female with right side shoulder complaints.     Patient has the following significant findings with corresponding treatment plan.                Diagnosis 1:  R shoulder impingement, likely infra/teres, bursa, with some biceps tendinopathy    Pain -  hot/cold therapy, US, electric stimulation, manual therapy and directional preference exercise  Decreased ROM/flexibility - manual therapy and therapeutic exercise  Decreased strength - therapeutic exercise and therapeutic activities  Impaired balance - neuro re-education and therapeutic activities  Decreased proprioception - neuro re-education and therapeutic activities  Edema - electric stimulation and cold therapy  Impaired gait - gait training  Decreased function - therapeutic activities  Impaired posture - neuro re-education    Therapy Evaluation Codes:   1) History comprised of:   Personal factors that impact the plan of care:      None.    Comorbidity factors that impact the plan of care are:      None.     Medications impacting care: None.  PATIENTS NAME:  Carli Michelle   : 1950    2) Examination of Body Systems comprised of:   Body structures and functions that impact the plan of care:      Shoulder.   Activity limitations that impact the plan of care are:      Dressing, Lifting, Sleeping and Laying down.  3) Clinical presentation characteristics are:   Stable/Uncomplicated.  4) Decision-Making    Low complexity using standardized patient assessment instrument and/or measureable assessment of functional outcome.  Cumulative Therapy Evaluation is: Low complexity.    Communication ability:  Patient appears to be able to clearly communicate and understand verbal and written communication and follow directions correctly.  Treatment Explanation - The following has been discussed with the patient:   RX ordered/plan of care  Anticipated outcomes  Possible risks and side effects  This patient would benefit from PT intervention to resume normal activities.   Rehab potential is good.  Frequency:  1 X  "week, once daily  Duration:  for 6 weeks  Discharge Plan:  Achieve all LTG.  Independent in home treatment program.  Reach maximal therapeutic benefit.      Caregiver Signature/Credentials _____________________________ Date ________      Treating Provider: Chun Potter, DPT, SCS, Cert. MDT     I have reviewed and certified the need for these services and plan of treatment while under my care.    PHYSICIAN'S SIGNATURE:   ____________________________________  Date___________                       Houston Menchaca MD    Certification period:  Beginning of Cert date period: 08/16/21 to  End of Cert period date: 11/13/21     Functional Level Progress Report: Please see attached \"Goal Flow sheet for Functional level.\"    ____X____ Continue Services or ________ DC Services                Service dates: From  SOC Date: 08/16/21 date to present                         "

## 2021-09-03 ENCOUNTER — DOCUMENTATION ONLY (OUTPATIENT)
Dept: FAMILY MEDICINE | Facility: CLINIC | Age: 71
End: 2021-09-03

## 2021-09-03 NOTE — PROGRESS NOTES
"When opening a documentation only encounter, be sure to enter in \"Chief Complaint\" Forms and in \" Comments\" Title of form, description if needed.    Carli is a 71 year old  female  Form received via: Fax  Form now resides in: Provider Ready    Carolina Cordova MA    Form has been completed by provider.     Form sent out via: Fax to [facility] at Fax Number: [phone]  Patient informed: No, Reason:n/a  Output date: September 9, 2021    Carolina Cordova MA      **Please close the encounter**                  "

## 2021-09-04 ENCOUNTER — HEALTH MAINTENANCE LETTER (OUTPATIENT)
Age: 71
End: 2021-09-04

## 2022-04-04 ENCOUNTER — OFFICE VISIT (OUTPATIENT)
Dept: FAMILY MEDICINE | Facility: CLINIC | Age: 72
End: 2022-04-04
Payer: MEDICARE

## 2022-04-04 VITALS
DIASTOLIC BLOOD PRESSURE: 77 MMHG | TEMPERATURE: 98.7 F | WEIGHT: 116.8 LBS | SYSTOLIC BLOOD PRESSURE: 144 MMHG | RESPIRATION RATE: 16 BRPM | OXYGEN SATURATION: 98 % | BODY MASS INDEX: 23.55 KG/M2 | HEIGHT: 59 IN | HEART RATE: 61 BPM

## 2022-04-04 DIAGNOSIS — Z23 HIGH PRIORITY FOR COVID-19 VACCINATION: ICD-10-CM

## 2022-04-04 DIAGNOSIS — Z00.00 ENCOUNTER FOR ANNUAL WELLNESS EXAM IN MEDICARE PATIENT: Primary | ICD-10-CM

## 2022-04-04 DIAGNOSIS — Z71.89 ADVANCE CARE PLANNING: ICD-10-CM

## 2022-04-04 DIAGNOSIS — M25.512 ACUTE PAIN OF LEFT SHOULDER: ICD-10-CM

## 2022-04-04 PROCEDURE — G0439 PPPS, SUBSEQ VISIT: HCPCS | Performed by: STUDENT IN AN ORGANIZED HEALTH CARE EDUCATION/TRAINING PROGRAM

## 2022-04-04 PROCEDURE — 0054A COVID-19,PF,PFIZER (12+ YRS): CPT | Performed by: STUDENT IN AN ORGANIZED HEALTH CARE EDUCATION/TRAINING PROGRAM

## 2022-04-04 PROCEDURE — 91305 COVID-19,PF,PFIZER (12+ YRS): CPT | Performed by: STUDENT IN AN ORGANIZED HEALTH CARE EDUCATION/TRAINING PROGRAM

## 2022-04-04 NOTE — PATIENT INSTRUCTIONS
If you had an XRay/CT/Ultrasound/MRI ordered the number is [phone] to schedule or change your radiology appointment.   Medical Concerns:  If you have urgent medical concerns please call [phone] at any time of the day.    Deandre Bloom, DO [facility] MEDICARE PERSONAL PREVENTIVE SERVICES PLAN - SERVICES     Review these tests with your doctor then decide which ones you want and take this page home for your reference  Immunization History   Administered Date(s) Administered     COVID-19,PF,Moderna 02/25/2021, 03/29/2021, 11/02/2021     HEPA 05/12/1997, 12/01/1997     HepB 09/30/1997, 06/12/2006, 12/11/2006     Influenza (H1N1) 01/07/2010     Influenza (High Dose) 3 valent vaccine 10/11/2016, 09/30/2018, 09/02/2020     Influenza (IIV3) PF 11/14/1996, 11/07/1997, 10/17/2006, 11/06/2007, 11/19/2008, 09/29/2011, 09/17/2014     Influenza Vaccine IM > 6 months Valent IIV4 (Alfuria,Fluzone) 10/21/2015, 10/17/2019     Influenza, Quad, High Dose, Pf, 65yr+ (Fluzone HD) 09/28/2021     OPV, trivalent, live 08/05/1958, 06/22/1960, 07/27/1966     Pneumo Conj 13-V (2010&after) 01/30/2020     Pneumococcal 23 valent 02/02/2021     Small Pox 12/21/1966     TD (ADULT, 7+) 06/22/1960, 06/19/1963, 07/27/1966, 07/19/2002     TDAP Vaccine (Boostrix) 05/07/2013     Tetanus 01/01/1969     Typhoid Oral 07/11/2006, 07/11/2006, 07/13/2006, 07/15/2006, 07/17/2006     Yellow Fever 07/11/2006     Zoster vaccine, live 04/20/2010, 02/25/2019, 04/30/2019          IMMUNIZATIONS Description Recommend today?     Influenza (flu shot) Helps to prevent flu; you should get it every year No; is up to date.   PCV 13 Prevents pneumonia; given once No; is up to date.   PPSV 23 Prevents pneumonia; given once No; is up to date.   Tetanus Prevents tetanus; need every 10 years No; is up to date.   Herpes Zoster (shingles) Prevents or lessens symptoms from shingles; given once No; is up to date.   Hepatitis B  If you have any of the following risk factors  you should be immunized for hepatitis B: severe kidney disease, people who live in the same house as a carrier of Hepatitis B virus, people who live in  institutions (e.g. nursing homes or group homes), homosexual men, patients with hemophilia who received Factor VIII or IX concentrates, abusers of illicit injectable drugs No; is up to date.           Health Maintenance   Topic Date Due     MEDICARE ANNUAL WELLNESS VISIT  04/04/2023     FALL RISK ASSESSMENT  04/04/2023     DTAP/TDAP/TD IMMUNIZATION (7 - Td or Tdap) 05/07/2023     MAMMO SCREENING  07/29/2023     LIPID  11/28/2023     ADVANCE CARE PLANNING  04/01/2026     COLORECTAL CANCER SCREENING  12/02/2026     DEXA  01/21/2030     HEPATITIS C SCREENING  Completed     PHQ-2 (once per calendar year)  Completed     INFLUENZA VACCINE  Completed     Pneumococcal Vaccine: 65+ Years  Completed     IPV IMMUNIZATION  Completed     COVID-19 Vaccine  Completed     MENINGITIS IMMUNIZATION  Aged Out     HEPATITIS B IMMUNIZATION  Aged Out     ZOSTER IMMUNIZATION  Discontinued       SCREENING TESTS     Description   Year of Last Screening   Recommended Today?   Heart disease screening blood tests    Cholesterol level Reducing cholesterol can reduce your risk of heart attacks by 25%.  Screening is recommended yearly if you are at risk of heart disease otherwise every 4-5 years  No; is up to date.   Diabetes screening tests    Hemoglobin A1c blood test   Finding and treating diabetes early can reduce complications.  Screening recommended/covered yearly if you have high blood pressure, high cholesterol, obesity (BMI >30), or a history of high blood glucose tests; or 2 of the following: family history of diabetes, overweight (BMI >25 but <30), age 65 years or older, and a history of diabetes of pregnancy or gave birth to baby weighing more than 9 lbs.  No; is up to date.   Hepatitis B screening Finding hepatitis B early can reduce complications.  Screening is recommended for  persons with selected risk factors.  No; is up to date.   Hepatitis C screening Finding hepatitis C early can reduce complications.  Screening is recommended for all persons born from 1945 through 1965 and for those with selected other risk factors.   No; is up to date.   HIV screening Finding HIV early can reduce complications.  Screening is recommended for persons with risk factors for HIV infection.  No; is up to date.   Glaucoma screening Early detection of glaucoma can prevent blindness.   Please talk to your eye doctor about this.     {  Health Maintenance   Topic Date Due     MEDICARE ANNUAL WELLNESS VISIT  04/04/2023     FALL RISK ASSESSMENT  04/04/2023     DTAP/TDAP/TD IMMUNIZATION (7 - Td or Tdap) 05/07/2023     MAMMO SCREENING  07/29/2023     LIPID  11/28/2023     ADVANCE CARE PLANNING  04/01/2026     COLORECTAL CANCER SCREENING  12/02/2026     DEXA  01/21/2030     HEPATITIS C SCREENING  Completed     PHQ-2 (once per calendar year)  Completed     INFLUENZA VACCINE  Completed     Pneumococcal Vaccine: 65+ Years  Completed     IPV IMMUNIZATION  Completed     COVID-19 Vaccine  Completed     MENINGITIS IMMUNIZATION  Aged Out     HEPATITIS B IMMUNIZATION  Aged Out     ZOSTER IMMUNIZATION  Discontinued         SCREENING TESTS     Description   Year of Last Screening   Recommended Today?   Colorectal cancer screening    Fecal occult blood test     Screening colonoscopy Screening for colon cancer has been shown to reduce death from colon cancer by 25-30%. Screening recommended to start at 50 years and continuing until age 75 years.    No; is up to date.   Breast Cancer Screening (women)    Mammogram Mammogram screening for breast cancer has been shown to reduce the risk of dying from breast cancer and prolong life. Screening is recommended every 1-2 years for women aged 50 to 74 years.   No; is up to date.   Cervical Cancer screening (women)    Pap Cervical pap smears can reduce cervical cancer. Screening is  recommended annually if high risk (history of abnormal pap smears) otherwise every 2-3 years, stop screening at 65 years of age if history of normal paps.  No; is up to date.   Screening for Osteoporosis:  Bone mass measurements (women)    Dexa Scan Screening and treating Osteoporosis can reduce the risk of hip and spine fractures. Screening is recommended in women 65 years or older and in women and men at risk of osteoporosis.  No; is up to date.   Screening for Lung Cancer     Low-dose CT scanning Screening can reduce mortality in persons aged 55-80 who have smoked at least 30 pack-years and who are either still smoking or have quit in the past 15 years.  No; is up to date.   Abdominal Aortic Aneurysm (AAA) screening    Ultrasound (US)   An aneurysm treated before rupture is very safe -a ruptured aneurysm can be fatal.  Screening  by US for AAA is limited to patients who meet one of the following criteria:    Men who are 65-75 years old and have smoked more than 100 cigarettes in their lifetime    Anyone with a family history of abdominal aortic aneurysm  No; is up to date.       MEDICARE WELLNESS EXAM PATIENT INSTRUCTIONS    Yearly exam:     See your health care provider every year in order to review changes in your health, review medicines that you take, and discuss preventive care needs such as immunizations and cancer screening.    Get a flu shot each year.     Advance Directive:    If you have not done so, you are encouraged to complete an advance directive. If you would like support with this, please contact the clinic  through the main clinic line. More information about advance directives can be found at:     [email or web address]    Nutrition:     Eat at least 5 servings of fruits and vegetables each day.     Eat whole-grain bread, whole-wheat pasta and brown rice instead of white grains and rice.     Talk to your doctor about Calcium and Vitamin  D.     Lifestyle:    Exercise for at least 150 minutes a week (30 minutes a day, 5 days a week). This will help you control your weight and prevent disease.     Limit alcohol to one drink per day.     If you smoke, try to quit - your doctor will be happy to help.     Wear sunscreen to prevent skin cancer.     See your dentist every six months for an exam and cleaning.     See your eye doctor every 1 to 2 years to screen for conditions such as glaucoma, macular degeneration and cataracts.

## 2022-04-04 NOTE — PROGRESS NOTES
>64 year old Wellness Visit ( Medicare etc)         HPI       This 71 year old female presents as an established patient  Houston Mencahca who presents for a  Annual Wellness Exam.    Other issues patient wants to address today:    yes, Pain in the left shoulder - reading in bed    Visual Acuity: :  Testing not done; patient has seen eye doctor in the past 12 months.    Patient Active Problem List   Diagnosis     Onychomycosis     Pain of left thumb     Cervical radiculopathy       Past Medical History:   Diagnosis Date     Atrophic vaginitis         Family History   Problem Relation Age of Onset     Breast Cancer Mother 40     Glaucoma Mother      Alzheimer Disease Father 82     Diabetes Father      Cancer Maternal Grandmother 68        unknown cancer     Breast Cancer Maternal Aunt      Glaucoma Maternal Aunt          Problem List, Family History and past Medical History reviewed and unchanged/updated.       Health risk Assessment/ Review of Systems:         Constitutional:   Fevers or night sweats?  NO      Eyes:   Vision problems?  NO            Hearing:  Do you feel you have hearing loss?  Yes, harder to understand TV, harder to hear conversation in some settings  Cardiovascular:  Chest pain, palpitations, or pain with walking?  NO        Respiratory:   Breathing problems or cough?  NO    :   Difficulty controlling urination?  NO        Musculoskeletal:   Stiffness or sore joints?  Yes, pain in the left shoulder blade area            Skin:   concerning lesions or moles?  NO           Nervous System:   loss of strength or sensation,  numbness or tingling,  tremor,  dizziness,  headache?  NO         Mental Health:   depression or anxiety,  sleep problems?  Yes, wake up around 3 or 4 am difficulty falling back asleep   Cognition:  Memory problems?  Yes       Weight Loss: Have you lost 10 or more pounds unintentionally in the previous year?  NO  Energy: How much of the time during the past 3 weeks did you feel  tired?   (check one of the following):   ?  All of the time  ? Most of the time  ? Some of the time  ? A little of the time  ? None of the Time    PHQ-2 Score:   PHQ-2 ( 1999 Pfizer) 4/4/2022 4/1/2021   Q1: Little interest or pleasure in doing things 0 0   Q2: Feeling down, depressed or hopeless 0 0   PHQ-2 Score 0 0   PHQ-2 Total Score (12-17 Years)- Positive if 3 or more points; Administer PHQ-A if positive - 0       PHQ-9 Score:   [facility] Follow-up to PHQ 12/9/2016   PHQ-9 9. Suicide Ideation past 2 weeks Not at all     Medical Care:     What other specialists or organizations are involved in your medical care?   Dr. Mike Harrison, ophthalmology  Patient Care Team       Relationship Specialty Notifications Start End    Houston Menchaca MD PCP - General Family Practice  4/8/11     Phone: [phone] Fax: [phone]         [address] [address]    Houston Menchaca MD MD Family Practice  11/18/16     referring to [facility]    Phone: [phone] Fax: [phone]         [address] [address]    Frank Diamond MD MD Colon and Rectal Surgery  11/18/16     Phone: [phone] Fax: [phone]         [address] [place] [address] [address] [facility] [address]    Houston Menchaca MD Assigned PCP   11/22/20     Phone: [phone] Fax: [phone]         [address] [address]          Do you have an advanced directive? Yes      Social History / Home Safety     Social History     Tobacco Use     Smoking status: Never Smoker     Smokeless tobacco: Never Used   Substance Use Topics     Alcohol use: Yes     Alcohol/week: 5.8 - 7.5 standard drinks     Types: 7 - 9 Standard drinks or equivalent per week     Marital Status:Partnered  Who lives in your household? Partner and self  Does your home have any of the following safety concerns? Loose rugs in the hallway, no grab bars in the bathroom, no handrails on the stairs or have poorly lit areas?  No   Do  you feel threatened or controlled by a partner, ex-partner or anyone in your life? {Yes No   Has anyone hurt you physically, for example by pushing, hitting, slapping or kicking you   or forcing you to have sex? No       Functional Status     Do you need help with dressing yourself, bathing, or walking?No   Do you need help with the phone, transportation, shopping, preparing meals, housework, laundry, medications or managing money?No   By yourself and not using aids, do you have any difficulty walking up 10 steps  without resting? No   By yourself and not using aids, do you have any difficulty walking several hundred yards? No              Risk Behaviors and Healthy Habits     History   Smoking Status     Never Smoker   Smokeless Tobacco     Never Used     How many servings of fruits and vegetables do you eat a day? 2  Exercise:walking  and strength training 4-5 days/week for an average of 45-60 minutes  Do you frequently drive without a seatbelt? No   History   Smoking Status     Never Smoker   Smokeless Tobacco     Never Used     Do you use any other drugs? No       Do you use alcohol?Yes  Number of drinks per day 1-2  Number of drinking days a week 7      Functional Ability   Was the patient's timed Up & Go test (Get up from chair walk, 10 feet turn, return to chair and sit down) unsteady or longer than 10 seconds? No     Fall risk:     1. Have you fallen two or more times in the past year? No   2. Have you fallen and had an injury in the past year?  No         Evaulation of Cognitive Function     Family member/caregiver input: Normal    1) Repeat 3 items (Leader, Season, Table)    2) Clock draw: NORMAL  3) 3 item recall: Recalls 3 objects  Results: 3 items recalled: COGNITIVE IMPAIRMENT LESS LIKELY    Mini-CogTM Copyright S Rodríguez. Licensed by the author for use in [facility]; reprinted with permission ([email or web address]). All rights reserved.          Other Assessments:     CV Risk based on Pooled  "Cohort Risk (consider assessing every 4-6 years; consider statin in patients with 10-yr risk > 7.5%):   The 10-year ASCVD risk score (Bradly URRUTIA Jr., et al., 2013) is: 13.5%    Values used to calculate the score:      Age: 71 years      Sex: Female      Is Non- : No      Diabetic: No      Tobacco smoker: No      Systolic Blood Pressure: 144 mmHg      Is BP treated: No      HDL Cholesterol: 94.5 mg/dL      Total Cholesterol: 185.2 mg/dL    Advance Directives: Discussed with patient and family as appropriate.  Has patient completed advance directives? Yes, advance care planning is on file.      Immunization History   Administered Date(s) Administered     COVID-19,PF,Moderna 02/25/2021, 03/29/2021, 11/02/2021     COVID-19,PF,Pfizer 12+ Yrs (2022 and After) 04/04/2022     HEPA 05/12/1997, 12/01/1997     HepB 09/30/1997, 06/12/2006, 12/11/2006     Influenza (H1N1) 01/07/2010     Influenza (High Dose) 3 valent vaccine 10/11/2016, 09/30/2018, 09/02/2020     Influenza (IIV3) PF 11/14/1996, 11/07/1997, 10/17/2006, 11/06/2007, 11/19/2008, 09/29/2011, 09/17/2014     Influenza Vaccine IM > 6 months Valent IIV4 (Alfuria,Fluzone) 10/21/2015, 10/17/2019     Influenza, Quad, High Dose, Pf, 65yr+ (Fluzone HD) 09/28/2021     OPV, trivalent, live 08/05/1958, 06/22/1960, 07/27/1966     Pneumo Conj 13-V (2010&after) 01/30/2020     Pneumococcal 23 valent 02/02/2021     Small Pox 12/21/1966     TD (ADULT, 7+) 06/22/1960, 06/19/1963, 07/27/1966, 07/19/2002     TDAP Vaccine (Boostrix) 05/07/2013     Tetanus 01/01/1969     Typhoid Oral 07/11/2006, 07/11/2006, 07/13/2006, 07/15/2006, 07/17/2006     Yellow Fever 07/11/2006     Zoster vaccine, live 04/20/2010, 02/25/2019, 04/30/2019     Reviewed Immunization Record Today    Physical Exam     Vitals: BP (!) 144/77 (BP Location: Left arm)   Pulse 61   Temp 98.7  F (37.1  C) (Oral)   Resp 16   Ht 1.499 m (4' 11\")   Wt 53 kg (116 lb 12.8 oz)   SpO2 98%   Breastfeeding No "   BMI 23.59 kg/m    BMI= Body mass index is 23.59 kg/m .  GENERAL APPEARANCE: alert and no distress  HENT: ear canals patent and TM's normal; mouth without ulcers or lesions; and oral mucous membranes moist  Hearing loss screen:   Normal   DENTITION:  Good repair?  yes  RESP: lungs clear to auscultation - no rales, rhonchi or wheezes  CV: regular rates and rhythm, no murmur, click or rub, no peripheral edema and peripheral pulses strong  SKIN: no suspicious lesions or rashes  NEURO: Normal strength and tone  MENTAL STATUS EXAM  Appearance: appropriate  Attitude: cooperative  Behavior: normal  Eye Contact: normal  Speech: normal  Orientation: oreinted to person , place, time and situation  Mood:  good  Affect: Mood Congruent  Thought Process: clear      Assessment and Plan     Carli was seen today for medicare visit, shoulder pain and hypertension.    Diagnoses and all orders for this visit:    Encounter for annual wellness exam in Medicare patient  reviewed Preventive Services and Plan form with patient as specified in Patient Instructions.  Advance care planning  Advance care document on file    Acute pain of left shoulder  Patient with acute pain of left shoulder that improved within the visit.  Seems to be scapular muscle strain.  Light range of motion exercises completed with patient.    High priority for COVID-19 vaccination  -     COVID-19,PF,PFIZER (12+ YRS)    Options for treatment and follow-up care were reviewed with the Carli KAT Michelle and/or guardian engaged in the decision making process and verbalized understanding of the options discussed and agreed with the final plan.    Deandre Bloom DO, MA  Pronouns: he/him/his    [facility] - [facility]/ [facility]    Department of Family Medicine and Community Health

## 2022-08-11 ENCOUNTER — HOSPITAL ENCOUNTER (OUTPATIENT)
Dept: MAMMOGRAPHY | Facility: CLINIC | Age: 72
Discharge: HOME | End: 2022-08-11
Attending: FAMILY MEDICINE | Admitting: FAMILY MEDICINE
Payer: MEDICARE

## 2022-08-11 DIAGNOSIS — Z12.31 VISIT FOR SCREENING MAMMOGRAM: ICD-10-CM

## 2022-08-11 PROCEDURE — 77067 SCR MAMMO BI INCL CAD: CPT

## 2022-10-22 ENCOUNTER — HEALTH MAINTENANCE LETTER (OUTPATIENT)
Age: 72
End: 2022-10-22

## 2022-10-31 ENCOUNTER — APPOINTMENT (OUTPATIENT)
Dept: GENERAL RADIOLOGY | Facility: CLINIC | Age: 72
DRG: 481 | End: 2022-10-31
Attending: EMERGENCY MEDICINE
Payer: MEDICARE

## 2022-10-31 ENCOUNTER — HOSPITAL ENCOUNTER (INPATIENT)
Facility: CLINIC | Age: 72
LOS: 3 days | Discharge: HOME HEALTH | DRG: 481 | End: 2022-11-04
Attending: EMERGENCY MEDICINE | Admitting: HOSPITALIST
Payer: MEDICARE

## 2022-10-31 DIAGNOSIS — S72.001A CLOSED FRACTURE OF NECK OF RIGHT FEMUR, INITIAL ENCOUNTER (H): ICD-10-CM

## 2022-10-31 DIAGNOSIS — R50.9 FEVER IN ADULT: ICD-10-CM

## 2022-10-31 PROCEDURE — C9803 HOPD COVID-19 SPEC COLLECT: HCPCS

## 2022-10-31 PROCEDURE — 85014 HEMATOCRIT: CPT | Performed by: EMERGENCY MEDICINE

## 2022-10-31 PROCEDURE — 96376 TX/PRO/DX INJ SAME DRUG ADON: CPT

## 2022-10-31 PROCEDURE — 80048 BASIC METABOLIC PNL TOTAL CA: CPT | Performed by: EMERGENCY MEDICINE

## 2022-10-31 PROCEDURE — 85041 AUTOMATED RBC COUNT: CPT | Performed by: EMERGENCY MEDICINE

## 2022-10-31 PROCEDURE — 250N000011 HC RX IP 250 OP 636: Performed by: EMERGENCY MEDICINE

## 2022-10-31 PROCEDURE — 93005 ELECTROCARDIOGRAM TRACING: CPT

## 2022-10-31 PROCEDURE — 73502 X-RAY EXAM HIP UNI 2-3 VIEWS: CPT

## 2022-10-31 PROCEDURE — 36415 COLL VENOUS BLD VENIPUNCTURE: CPT | Performed by: EMERGENCY MEDICINE

## 2022-10-31 PROCEDURE — U0003 INFECTIOUS AGENT DETECTION BY NUCLEIC ACID (DNA OR RNA); SEVERE ACUTE RESPIRATORY SYNDROME CORONAVIRUS 2 (SARS-COV-2) (CORONAVIRUS DISEASE [COVID-19]), AMPLIFIED PROBE TECHNIQUE, MAKING USE OF HIGH THROUGHPUT TECHNOLOGIES AS DESCRIBED BY CMS-2020-01-R: HCPCS | Performed by: EMERGENCY MEDICINE

## 2022-10-31 PROCEDURE — 99285 EMERGENCY DEPT VISIT HI MDM: CPT | Mod: 25

## 2022-10-31 PROCEDURE — 96374 THER/PROPH/DIAG INJ IV PUSH: CPT

## 2022-10-31 RX ORDER — HYDROMORPHONE HYDROCHLORIDE 1 MG/ML
0.5 INJECTION, SOLUTION INTRAMUSCULAR; INTRAVENOUS; SUBCUTANEOUS
Status: DISCONTINUED | OUTPATIENT
Start: 2022-10-31 | End: 2022-11-01

## 2022-10-31 RX ADMIN — HYDROMORPHONE HYDROCHLORIDE 0.5 MG: 1 INJECTION, SOLUTION INTRAMUSCULAR; INTRAVENOUS; SUBCUTANEOUS at 23:57

## 2022-10-31 ASSESSMENT — ACTIVITIES OF DAILY LIVING (ADL): ADLS_ACUITY_SCORE: 33

## 2022-11-01 ENCOUNTER — ANESTHESIA (OUTPATIENT)
Dept: SURGERY | Facility: CLINIC | Age: 72
DRG: 481 | End: 2022-11-01
Payer: MEDICARE

## 2022-11-01 ENCOUNTER — APPOINTMENT (OUTPATIENT)
Dept: GENERAL RADIOLOGY | Facility: CLINIC | Age: 72
DRG: 481 | End: 2022-11-01
Attending: HOSPITALIST
Payer: MEDICARE

## 2022-11-01 ENCOUNTER — APPOINTMENT (OUTPATIENT)
Dept: GENERAL RADIOLOGY | Facility: CLINIC | Age: 72
DRG: 481 | End: 2022-11-01
Attending: ORTHOPAEDIC SURGERY
Payer: MEDICARE

## 2022-11-01 ENCOUNTER — ANESTHESIA EVENT (OUTPATIENT)
Dept: SURGERY | Facility: CLINIC | Age: 72
DRG: 481 | End: 2022-11-01
Payer: MEDICARE

## 2022-11-01 PROBLEM — S72.001A CLOSED FRACTURE OF NECK OF RIGHT FEMUR, INITIAL ENCOUNTER (H): Status: ACTIVE | Noted: 2022-11-01

## 2022-11-01 PROBLEM — R50.9 FEVER IN ADULT: Status: ACTIVE | Noted: 2022-11-01

## 2022-11-01 LAB
ALBUMIN UR-MCNC: NEGATIVE MG/DL
ANION GAP SERPL CALCULATED.3IONS-SCNC: 6 MMOL/L (ref 3–14)
ANION GAP SERPL CALCULATED.3IONS-SCNC: 7 MMOL/L (ref 3–14)
APPEARANCE UR: CLEAR
ATRIAL RATE - MUSE: 78 BPM
BASOPHILS # BLD AUTO: 0 10E3/UL (ref 0–0.2)
BASOPHILS # BLD AUTO: 0.1 10E3/UL (ref 0–0.2)
BASOPHILS NFR BLD AUTO: 1 %
BASOPHILS NFR BLD AUTO: 1 %
BILIRUB UR QL STRIP: NEGATIVE
BUN SERPL-MCNC: 18 MG/DL (ref 7–30)
BUN SERPL-MCNC: 20 MG/DL (ref 7–30)
CALCIUM SERPL-MCNC: 8.1 MG/DL (ref 8.5–10.1)
CALCIUM SERPL-MCNC: 9.1 MG/DL (ref 8.5–10.1)
CHLORIDE BLD-SCNC: 109 MMOL/L (ref 94–109)
CHLORIDE BLD-SCNC: 113 MMOL/L (ref 94–109)
CO2 SERPL-SCNC: 26 MMOL/L (ref 20–32)
CO2 SERPL-SCNC: 26 MMOL/L (ref 20–32)
COLOR UR AUTO: ABNORMAL
CREAT SERPL-MCNC: 0.94 MG/DL (ref 0.52–1.04)
CREAT SERPL-MCNC: 0.96 MG/DL (ref 0.52–1.04)
DIASTOLIC BLOOD PRESSURE - MUSE: NORMAL MMHG
EOSINOPHIL # BLD AUTO: 0 10E3/UL (ref 0–0.7)
EOSINOPHIL # BLD AUTO: 0.1 10E3/UL (ref 0–0.7)
EOSINOPHIL NFR BLD AUTO: 0 %
EOSINOPHIL NFR BLD AUTO: 1 %
ERYTHROCYTE [DISTWIDTH] IN BLOOD BY AUTOMATED COUNT: 13.1 % (ref 10–15)
ERYTHROCYTE [DISTWIDTH] IN BLOOD BY AUTOMATED COUNT: 13.2 % (ref 10–15)
GFR SERPL CREATININE-BSD FRML MDRD: 62 ML/MIN/1.73M2
GFR SERPL CREATININE-BSD FRML MDRD: 64 ML/MIN/1.73M2
GLUCOSE BLD-MCNC: 106 MG/DL (ref 70–99)
GLUCOSE BLD-MCNC: 99 MG/DL (ref 70–99)
GLUCOSE UR STRIP-MCNC: NEGATIVE MG/DL
HCT VFR BLD AUTO: 39.8 % (ref 35–47)
HCT VFR BLD AUTO: 40.6 % (ref 35–47)
HGB BLD-MCNC: 13.3 G/DL (ref 11.7–15.7)
HGB BLD-MCNC: 13.9 G/DL (ref 11.7–15.7)
HGB UR QL STRIP: NEGATIVE
IMM GRANULOCYTES # BLD: 0 10E3/UL
IMM GRANULOCYTES # BLD: 0.1 10E3/UL
IMM GRANULOCYTES NFR BLD: 0 %
IMM GRANULOCYTES NFR BLD: 1 %
INTERPRETATION ECG - MUSE: NORMAL
KETONES UR STRIP-MCNC: NEGATIVE MG/DL
LEUKOCYTE ESTERASE UR QL STRIP: ABNORMAL
LYMPHOCYTES # BLD AUTO: 1.9 10E3/UL (ref 0.8–5.3)
LYMPHOCYTES # BLD AUTO: 3.1 10E3/UL (ref 0.8–5.3)
LYMPHOCYTES NFR BLD AUTO: 13 %
LYMPHOCYTES NFR BLD AUTO: 36 %
MCH RBC QN AUTO: 31.2 PG (ref 26.5–33)
MCH RBC QN AUTO: 31.2 PG (ref 26.5–33)
MCHC RBC AUTO-ENTMCNC: 33.4 G/DL (ref 31.5–36.5)
MCHC RBC AUTO-ENTMCNC: 34.2 G/DL (ref 31.5–36.5)
MCV RBC AUTO: 91 FL (ref 78–100)
MCV RBC AUTO: 93 FL (ref 78–100)
MONOCYTES # BLD AUTO: 0.9 10E3/UL (ref 0–1.3)
MONOCYTES # BLD AUTO: 1.1 10E3/UL (ref 0–1.3)
MONOCYTES NFR BLD AUTO: 11 %
MONOCYTES NFR BLD AUTO: 7 %
MUCOUS THREADS #/AREA URNS LPF: PRESENT /LPF
NEUTROPHILS # BLD AUTO: 11.8 10E3/UL (ref 1.6–8.3)
NEUTROPHILS # BLD AUTO: 4.4 10E3/UL (ref 1.6–8.3)
NEUTROPHILS NFR BLD AUTO: 51 %
NEUTROPHILS NFR BLD AUTO: 78 %
NITRATE UR QL: NEGATIVE
NRBC # BLD AUTO: 0 10E3/UL
NRBC # BLD AUTO: 0 10E3/UL
NRBC BLD AUTO-RTO: 0 /100
NRBC BLD AUTO-RTO: 0 /100
P AXIS - MUSE: 51 DEGREES
PH UR STRIP: 5.5 [PH] (ref 5–7)
PLATELET # BLD AUTO: 214 10E3/UL (ref 150–450)
PLATELET # BLD AUTO: 219 10E3/UL (ref 150–450)
POTASSIUM BLD-SCNC: 3.7 MMOL/L (ref 3.4–5.3)
POTASSIUM BLD-SCNC: 3.9 MMOL/L (ref 3.4–5.3)
PR INTERVAL - MUSE: 128 MS
QRS DURATION - MUSE: 76 MS
QT - MUSE: 356 MS
QTC - MUSE: 405 MS
R AXIS - MUSE: 54 DEGREES
RBC # BLD AUTO: 4.26 10E6/UL (ref 3.8–5.2)
RBC # BLD AUTO: 4.46 10E6/UL (ref 3.8–5.2)
RBC URINE: 1 /HPF
SARS-COV-2 RNA RESP QL NAA+PROBE: NEGATIVE
SODIUM SERPL-SCNC: 142 MMOL/L (ref 133–144)
SODIUM SERPL-SCNC: 145 MMOL/L (ref 133–144)
SP GR UR STRIP: 1.02 (ref 1–1.03)
SQUAMOUS EPITHELIAL: 1 /HPF
SYSTOLIC BLOOD PRESSURE - MUSE: NORMAL MMHG
T AXIS - MUSE: 28 DEGREES
UROBILINOGEN UR STRIP-MCNC: NORMAL MG/DL
VENTRICULAR RATE- MUSE: 78 BPM
WBC # BLD AUTO: 15.1 10E3/UL (ref 4–11)
WBC # BLD AUTO: 8.6 10E3/UL (ref 4–11)
WBC URINE: 6 /HPF

## 2022-11-01 PROCEDURE — 250N000009 HC RX 250: Performed by: ORTHOPAEDIC SURGERY

## 2022-11-01 PROCEDURE — 120N000001 HC R&B MED SURG/OB

## 2022-11-01 PROCEDURE — 999N000141 HC STATISTIC PRE-PROCEDURE NURSING ASSESSMENT: Performed by: ORTHOPAEDIC SURGERY

## 2022-11-01 PROCEDURE — 80048 BASIC METABOLIC PNL TOTAL CA: CPT | Performed by: HOSPITALIST

## 2022-11-01 PROCEDURE — 250N000009 HC RX 250: Performed by: ANESTHESIOLOGY

## 2022-11-01 PROCEDURE — 370N000017 HC ANESTHESIA TECHNICAL FEE, PER MIN: Performed by: ORTHOPAEDIC SURGERY

## 2022-11-01 PROCEDURE — 250N000011 HC RX IP 250 OP 636: Performed by: ANESTHESIOLOGY

## 2022-11-01 PROCEDURE — 250N000011 HC RX IP 250 OP 636

## 2022-11-01 PROCEDURE — 250N000025 HC SEVOFLURANE, PER MIN: Performed by: ORTHOPAEDIC SURGERY

## 2022-11-01 PROCEDURE — 250N000011 HC RX IP 250 OP 636: Performed by: ORTHOPAEDIC SURGERY

## 2022-11-01 PROCEDURE — 272N000001 HC OR GENERAL SUPPLY STERILE: Performed by: ORTHOPAEDIC SURGERY

## 2022-11-01 PROCEDURE — 85025 COMPLETE CBC W/AUTO DIFF WBC: CPT | Performed by: HOSPITALIST

## 2022-11-01 PROCEDURE — 81001 URINALYSIS AUTO W/SCOPE: CPT | Performed by: EMERGENCY MEDICINE

## 2022-11-01 PROCEDURE — 710N000009 HC RECOVERY PHASE 1, LEVEL 1, PER MIN: Performed by: ORTHOPAEDIC SURGERY

## 2022-11-01 PROCEDURE — 250N000009 HC RX 250

## 2022-11-01 PROCEDURE — 258N000003 HC RX IP 258 OP 636: Performed by: HOSPITALIST

## 2022-11-01 PROCEDURE — 999N000179 XR SURGERY CARM FLUORO LESS THAN 5 MIN W STILLS

## 2022-11-01 PROCEDURE — 258N000003 HC RX IP 258 OP 636: Performed by: SURGERY

## 2022-11-01 PROCEDURE — 0QS634Z REPOSITION RIGHT UPPER FEMUR WITH INTERNAL FIXATION DEVICE, PERCUTANEOUS APPROACH: ICD-10-PCS | Performed by: ORTHOPAEDIC SURGERY

## 2022-11-01 PROCEDURE — 258N000003 HC RX IP 258 OP 636: Performed by: PHYSICIAN ASSISTANT

## 2022-11-01 PROCEDURE — 250N000011 HC RX IP 250 OP 636: Performed by: EMERGENCY MEDICINE

## 2022-11-01 PROCEDURE — 250N000011 HC RX IP 250 OP 636: Performed by: HOSPITALIST

## 2022-11-01 PROCEDURE — 250N000013 HC RX MED GY IP 250 OP 250 PS 637: Performed by: PHYSICIAN ASSISTANT

## 2022-11-01 PROCEDURE — 258N000003 HC RX IP 258 OP 636

## 2022-11-01 PROCEDURE — 71046 X-RAY EXAM CHEST 2 VIEWS: CPT

## 2022-11-01 PROCEDURE — 250N000013 HC RX MED GY IP 250 OP 250 PS 637: Performed by: HOSPITALIST

## 2022-11-01 PROCEDURE — 360N000084 HC SURGERY LEVEL 4 W/ FLUORO, PER MIN: Performed by: ORTHOPAEDIC SURGERY

## 2022-11-01 PROCEDURE — C1713 ANCHOR/SCREW BN/BN,TIS/BN: HCPCS | Performed by: ORTHOPAEDIC SURGERY

## 2022-11-01 PROCEDURE — 82306 VITAMIN D 25 HYDROXY: CPT | Performed by: PHYSICIAN ASSISTANT

## 2022-11-01 PROCEDURE — 250N000011 HC RX IP 250 OP 636: Performed by: PHYSICIAN ASSISTANT

## 2022-11-01 PROCEDURE — 36415 COLL VENOUS BLD VENIPUNCTURE: CPT | Performed by: HOSPITALIST

## 2022-11-01 PROCEDURE — 99222 1ST HOSP IP/OBS MODERATE 55: CPT | Mod: AI | Performed by: HOSPITALIST

## 2022-11-01 PROCEDURE — C1769 GUIDE WIRE: HCPCS | Performed by: ORTHOPAEDIC SURGERY

## 2022-11-01 DEVICE — IMP SCR SYN CAN 6.5X16 THRDX80MM SS 208.411: Type: IMPLANTABLE DEVICE | Site: HIP | Status: FUNCTIONAL

## 2022-11-01 DEVICE — IMP SCR SYN CAN 6.5X32 THRDX75MM SS 208.437: Type: IMPLANTABLE DEVICE | Site: HIP | Status: FUNCTIONAL

## 2022-11-01 DEVICE — IMP SCR SYN CAN 6.5X16 THRDX75MM SS 208.410: Type: IMPLANTABLE DEVICE | Site: HIP | Status: FUNCTIONAL

## 2022-11-01 RX ORDER — HYDROMORPHONE HCL IN WATER/PF 6 MG/30 ML
0.2 PATIENT CONTROLLED ANALGESIA SYRINGE INTRAVENOUS
Status: DISCONTINUED | OUTPATIENT
Start: 2022-11-01 | End: 2022-11-04 | Stop reason: HOSPADM

## 2022-11-01 RX ORDER — ONDANSETRON 2 MG/ML
4 INJECTION INTRAMUSCULAR; INTRAVENOUS EVERY 30 MIN PRN
Status: DISCONTINUED | OUTPATIENT
Start: 2022-11-01 | End: 2022-11-01 | Stop reason: HOSPADM

## 2022-11-01 RX ORDER — SODIUM CHLORIDE, SODIUM LACTATE, POTASSIUM CHLORIDE, CALCIUM CHLORIDE 600; 310; 30; 20 MG/100ML; MG/100ML; MG/100ML; MG/100ML
INJECTION, SOLUTION INTRAVENOUS CONTINUOUS
Status: DISCONTINUED | OUTPATIENT
Start: 2022-11-01 | End: 2022-11-02

## 2022-11-01 RX ORDER — TRANEXAMIC ACID 10 MG/ML
1 INJECTION, SOLUTION INTRAVENOUS ONCE
Status: DISCONTINUED | OUTPATIENT
Start: 2022-11-01 | End: 2022-11-01 | Stop reason: HOSPADM

## 2022-11-01 RX ORDER — FENTANYL CITRATE 0.05 MG/ML
50 INJECTION, SOLUTION INTRAMUSCULAR; INTRAVENOUS EVERY 5 MIN PRN
Status: DISCONTINUED | OUTPATIENT
Start: 2022-11-01 | End: 2022-11-01 | Stop reason: HOSPADM

## 2022-11-01 RX ORDER — LIDOCAINE 40 MG/G
CREAM TOPICAL
Status: DISCONTINUED | OUTPATIENT
Start: 2022-11-01 | End: 2022-11-04 | Stop reason: HOSPADM

## 2022-11-01 RX ORDER — ACETAMINOPHEN 325 MG/1
975 TABLET ORAL EVERY 8 HOURS
Status: COMPLETED | OUTPATIENT
Start: 2022-11-01 | End: 2022-11-04

## 2022-11-01 RX ORDER — BISACODYL 10 MG
10 SUPPOSITORY, RECTAL RECTAL DAILY PRN
Status: DISCONTINUED | OUTPATIENT
Start: 2022-11-01 | End: 2022-11-04 | Stop reason: HOSPADM

## 2022-11-01 RX ORDER — POLYETHYLENE GLYCOL 3350 17 G/17G
17 POWDER, FOR SOLUTION ORAL DAILY PRN
Status: DISCONTINUED | OUTPATIENT
Start: 2022-11-01 | End: 2022-11-04 | Stop reason: HOSPADM

## 2022-11-01 RX ORDER — NALOXONE HYDROCHLORIDE 0.4 MG/ML
0.4 INJECTION, SOLUTION INTRAMUSCULAR; INTRAVENOUS; SUBCUTANEOUS
Status: DISCONTINUED | OUTPATIENT
Start: 2022-11-01 | End: 2022-11-04 | Stop reason: HOSPADM

## 2022-11-01 RX ORDER — NALOXONE HYDROCHLORIDE 0.4 MG/ML
0.2 INJECTION, SOLUTION INTRAMUSCULAR; INTRAVENOUS; SUBCUTANEOUS
Status: DISCONTINUED | OUTPATIENT
Start: 2022-11-01 | End: 2022-11-04 | Stop reason: HOSPADM

## 2022-11-01 RX ORDER — CEFAZOLIN SODIUM 1 G/3ML
1 INJECTION, POWDER, FOR SOLUTION INTRAMUSCULAR; INTRAVENOUS EVERY 8 HOURS
Status: COMPLETED | OUTPATIENT
Start: 2022-11-01 | End: 2022-11-02

## 2022-11-01 RX ORDER — SODIUM CHLORIDE 9 MG/ML
INJECTION, SOLUTION INTRAVENOUS CONTINUOUS
Status: DISCONTINUED | OUTPATIENT
Start: 2022-11-01 | End: 2022-11-01

## 2022-11-01 RX ORDER — HYDROMORPHONE HCL IN WATER/PF 6 MG/30 ML
0.4 PATIENT CONTROLLED ANALGESIA SYRINGE INTRAVENOUS EVERY 5 MIN PRN
Status: DISCONTINUED | OUTPATIENT
Start: 2022-11-01 | End: 2022-11-01 | Stop reason: HOSPADM

## 2022-11-01 RX ORDER — CEFAZOLIN SODIUM/WATER 2 G/20 ML
2 SYRINGE (ML) INTRAVENOUS SEE ADMIN INSTRUCTIONS
Status: DISCONTINUED | OUTPATIENT
Start: 2022-11-01 | End: 2022-11-01 | Stop reason: HOSPADM

## 2022-11-01 RX ORDER — ONDANSETRON 4 MG/1
4 TABLET, ORALLY DISINTEGRATING ORAL EVERY 30 MIN PRN
Status: DISCONTINUED | OUTPATIENT
Start: 2022-11-01 | End: 2022-11-01 | Stop reason: HOSPADM

## 2022-11-01 RX ORDER — ONDANSETRON 4 MG/1
4 TABLET, ORALLY DISINTEGRATING ORAL EVERY 6 HOURS PRN
Status: DISCONTINUED | OUTPATIENT
Start: 2022-11-01 | End: 2022-11-01

## 2022-11-01 RX ORDER — PROPOFOL 10 MG/ML
INJECTION, EMULSION INTRAVENOUS PRN
Status: DISCONTINUED | OUTPATIENT
Start: 2022-11-01 | End: 2022-11-01

## 2022-11-01 RX ORDER — PROCHLORPERAZINE MALEATE 5 MG
5 TABLET ORAL EVERY 6 HOURS PRN
Status: DISCONTINUED | OUTPATIENT
Start: 2022-11-01 | End: 2022-11-04 | Stop reason: HOSPADM

## 2022-11-01 RX ORDER — PROCHLORPERAZINE 25 MG
12.5 SUPPOSITORY, RECTAL RECTAL EVERY 12 HOURS PRN
Status: DISCONTINUED | OUTPATIENT
Start: 2022-11-01 | End: 2022-11-04 | Stop reason: HOSPADM

## 2022-11-01 RX ORDER — AMOXICILLIN 250 MG
1 CAPSULE ORAL 2 TIMES DAILY
Status: DISCONTINUED | OUTPATIENT
Start: 2022-11-01 | End: 2022-11-04 | Stop reason: HOSPADM

## 2022-11-01 RX ORDER — OXYCODONE HYDROCHLORIDE 5 MG/1
10 TABLET ORAL EVERY 4 HOURS PRN
Status: DISCONTINUED | OUTPATIENT
Start: 2022-11-01 | End: 2022-11-01 | Stop reason: HOSPADM

## 2022-11-01 RX ORDER — SODIUM CHLORIDE, SODIUM LACTATE, POTASSIUM CHLORIDE, CALCIUM CHLORIDE 600; 310; 30; 20 MG/100ML; MG/100ML; MG/100ML; MG/100ML
INJECTION, SOLUTION INTRAVENOUS CONTINUOUS
Status: DISCONTINUED | OUTPATIENT
Start: 2022-11-01 | End: 2022-11-01

## 2022-11-01 RX ORDER — AMOXICILLIN 250 MG
2 CAPSULE ORAL 2 TIMES DAILY PRN
Status: DISCONTINUED | OUTPATIENT
Start: 2022-11-01 | End: 2022-11-04 | Stop reason: HOSPADM

## 2022-11-01 RX ORDER — SODIUM CHLORIDE, SODIUM LACTATE, POTASSIUM CHLORIDE, CALCIUM CHLORIDE 600; 310; 30; 20 MG/100ML; MG/100ML; MG/100ML; MG/100ML
INJECTION, SOLUTION INTRAVENOUS CONTINUOUS
Status: DISCONTINUED | OUTPATIENT
Start: 2022-11-01 | End: 2022-11-01 | Stop reason: HOSPADM

## 2022-11-01 RX ORDER — HYDROMORPHONE HYDROCHLORIDE 1 MG/ML
.2-.3 INJECTION, SOLUTION INTRAMUSCULAR; INTRAVENOUS; SUBCUTANEOUS
Status: DISCONTINUED | OUTPATIENT
Start: 2022-11-01 | End: 2022-11-02

## 2022-11-01 RX ORDER — FENTANYL CITRATE 50 UG/ML
INJECTION, SOLUTION INTRAMUSCULAR; INTRAVENOUS PRN
Status: DISCONTINUED | OUTPATIENT
Start: 2022-11-01 | End: 2022-11-01

## 2022-11-01 RX ORDER — MAGNESIUM HYDROXIDE 1200 MG/15ML
LIQUID ORAL PRN
Status: DISCONTINUED | OUTPATIENT
Start: 2022-11-01 | End: 2022-11-01 | Stop reason: HOSPADM

## 2022-11-01 RX ORDER — HYDROMORPHONE HCL IN WATER/PF 6 MG/30 ML
0.4 PATIENT CONTROLLED ANALGESIA SYRINGE INTRAVENOUS
Status: DISCONTINUED | OUTPATIENT
Start: 2022-11-01 | End: 2022-11-04 | Stop reason: HOSPADM

## 2022-11-01 RX ORDER — OXYCODONE HYDROCHLORIDE 5 MG/1
5 TABLET ORAL EVERY 4 HOURS PRN
Status: DISCONTINUED | OUTPATIENT
Start: 2022-11-01 | End: 2022-11-02

## 2022-11-01 RX ORDER — GLYCOPYRROLATE 0.2 MG/ML
INJECTION, SOLUTION INTRAMUSCULAR; INTRAVENOUS PRN
Status: DISCONTINUED | OUTPATIENT
Start: 2022-11-01 | End: 2022-11-01

## 2022-11-01 RX ORDER — OXYCODONE HYDROCHLORIDE 5 MG/1
10 TABLET ORAL EVERY 4 HOURS PRN
Status: DISCONTINUED | OUTPATIENT
Start: 2022-11-01 | End: 2022-11-02

## 2022-11-01 RX ORDER — POLYETHYLENE GLYCOL 3350 17 G/17G
17 POWDER, FOR SOLUTION ORAL DAILY
Status: DISCONTINUED | OUTPATIENT
Start: 2022-11-02 | End: 2022-11-04 | Stop reason: HOSPADM

## 2022-11-01 RX ORDER — ACETAMINOPHEN 325 MG/1
975 TABLET ORAL EVERY 8 HOURS
Status: DISCONTINUED | OUTPATIENT
Start: 2022-11-01 | End: 2022-11-01

## 2022-11-01 RX ORDER — OXYCODONE HYDROCHLORIDE 5 MG/1
5-10 TABLET ORAL
Status: DISCONTINUED | OUTPATIENT
Start: 2022-11-01 | End: 2022-11-01

## 2022-11-01 RX ORDER — DEXAMETHASONE SODIUM PHOSPHATE 4 MG/ML
INJECTION, SOLUTION INTRA-ARTICULAR; INTRALESIONAL; INTRAMUSCULAR; INTRAVENOUS; SOFT TISSUE PRN
Status: DISCONTINUED | OUTPATIENT
Start: 2022-11-01 | End: 2022-11-01

## 2022-11-01 RX ORDER — BUPIVACAINE HYDROCHLORIDE AND EPINEPHRINE 2.5; 5 MG/ML; UG/ML
INJECTION, SOLUTION INFILTRATION; PERINEURAL
Status: COMPLETED | OUTPATIENT
Start: 2022-11-01 | End: 2022-11-01

## 2022-11-01 RX ORDER — LANOLIN ALCOHOL/MO/W.PET/CERES
3 CREAM (GRAM) TOPICAL
Status: DISCONTINUED | OUTPATIENT
Start: 2022-11-01 | End: 2022-11-04 | Stop reason: HOSPADM

## 2022-11-01 RX ORDER — PROCHLORPERAZINE MALEATE 5 MG
5 TABLET ORAL EVERY 6 HOURS PRN
Status: DISCONTINUED | OUTPATIENT
Start: 2022-11-01 | End: 2022-11-01

## 2022-11-01 RX ORDER — ONDANSETRON 2 MG/ML
4 INJECTION INTRAMUSCULAR; INTRAVENOUS EVERY 6 HOURS PRN
Status: DISCONTINUED | OUTPATIENT
Start: 2022-11-01 | End: 2022-11-01

## 2022-11-01 RX ORDER — TRANEXAMIC ACID 10 MG/ML
1 INJECTION, SOLUTION INTRAVENOUS ONCE
Status: COMPLETED | OUTPATIENT
Start: 2022-11-01 | End: 2022-11-01

## 2022-11-01 RX ORDER — ONDANSETRON 2 MG/ML
INJECTION INTRAMUSCULAR; INTRAVENOUS PRN
Status: DISCONTINUED | OUTPATIENT
Start: 2022-11-01 | End: 2022-11-01

## 2022-11-01 RX ORDER — AMOXICILLIN 250 MG
1 CAPSULE ORAL 2 TIMES DAILY PRN
Status: DISCONTINUED | OUTPATIENT
Start: 2022-11-01 | End: 2022-11-04 | Stop reason: HOSPADM

## 2022-11-01 RX ORDER — ONDANSETRON 4 MG/1
4 TABLET, ORALLY DISINTEGRATING ORAL EVERY 6 HOURS PRN
Status: DISCONTINUED | OUTPATIENT
Start: 2022-11-01 | End: 2022-11-04 | Stop reason: HOSPADM

## 2022-11-01 RX ORDER — LIDOCAINE HYDROCHLORIDE 20 MG/ML
INJECTION, SOLUTION INFILTRATION; PERINEURAL PRN
Status: DISCONTINUED | OUTPATIENT
Start: 2022-11-01 | End: 2022-11-01

## 2022-11-01 RX ORDER — LABETALOL HYDROCHLORIDE 5 MG/ML
10 INJECTION, SOLUTION INTRAVENOUS
Status: DISCONTINUED | OUTPATIENT
Start: 2022-11-01 | End: 2022-11-01 | Stop reason: HOSPADM

## 2022-11-01 RX ORDER — ONDANSETRON 2 MG/ML
4 INJECTION INTRAMUSCULAR; INTRAVENOUS EVERY 6 HOURS PRN
Status: DISCONTINUED | OUTPATIENT
Start: 2022-11-01 | End: 2022-11-04 | Stop reason: HOSPADM

## 2022-11-01 RX ORDER — CEFAZOLIN SODIUM/WATER 2 G/20 ML
2 SYRINGE (ML) INTRAVENOUS
Status: DISCONTINUED | OUTPATIENT
Start: 2022-11-01 | End: 2022-11-01 | Stop reason: HOSPADM

## 2022-11-01 RX ORDER — ACETAMINOPHEN 325 MG/1
650 TABLET ORAL EVERY 4 HOURS PRN
Status: DISCONTINUED | OUTPATIENT
Start: 2022-11-04 | End: 2022-11-04 | Stop reason: HOSPADM

## 2022-11-01 RX ADMIN — DEXAMETHASONE SODIUM PHOSPHATE 4 MG: 4 INJECTION, SOLUTION INTRA-ARTICULAR; INTRALESIONAL; INTRAMUSCULAR; INTRAVENOUS; SOFT TISSUE at 12:34

## 2022-11-01 RX ADMIN — CEFAZOLIN 1 G: 1 INJECTION, POWDER, FOR SOLUTION INTRAMUSCULAR; INTRAVENOUS at 20:01

## 2022-11-01 RX ADMIN — SENNOSIDES AND DOCUSATE SODIUM 1 TABLET: 50; 8.6 TABLET ORAL at 21:19

## 2022-11-01 RX ADMIN — FENTANYL CITRATE 25 MCG: 50 INJECTION, SOLUTION INTRAMUSCULAR; INTRAVENOUS at 13:22

## 2022-11-01 RX ADMIN — SODIUM CHLORIDE, POTASSIUM CHLORIDE, SODIUM LACTATE AND CALCIUM CHLORIDE: 600; 310; 30; 20 INJECTION, SOLUTION INTRAVENOUS at 12:14

## 2022-11-01 RX ADMIN — ONDANSETRON 4 MG: 2 INJECTION INTRAMUSCULAR; INTRAVENOUS at 13:22

## 2022-11-01 RX ADMIN — PHENYLEPHRINE HYDROCHLORIDE 100 MCG: 10 INJECTION INTRAVENOUS at 12:57

## 2022-11-01 RX ADMIN — TRANEXAMIC ACID 1 G: 10 INJECTION, SOLUTION INTRAVENOUS at 12:34

## 2022-11-01 RX ADMIN — LIDOCAINE HYDROCHLORIDE 60 MG: 20 INJECTION, SOLUTION INFILTRATION; PERINEURAL at 12:30

## 2022-11-01 RX ADMIN — HYDROMORPHONE HYDROCHLORIDE 0.3 MG: 1 INJECTION, SOLUTION INTRAMUSCULAR; INTRAVENOUS; SUBCUTANEOUS at 10:23

## 2022-11-01 RX ADMIN — SODIUM CHLORIDE, POTASSIUM CHLORIDE, SODIUM LACTATE AND CALCIUM CHLORIDE: 600; 310; 30; 20 INJECTION, SOLUTION INTRAVENOUS at 10:23

## 2022-11-01 RX ADMIN — GLYCOPYRROLATE 0.2 MG: 0.2 INJECTION, SOLUTION INTRAMUSCULAR; INTRAVENOUS at 13:05

## 2022-11-01 RX ADMIN — PHENYLEPHRINE HYDROCHLORIDE 100 MCG: 10 INJECTION INTRAVENOUS at 12:58

## 2022-11-01 RX ADMIN — PROPOFOL 120 MG: 10 INJECTION, EMULSION INTRAVENOUS at 12:30

## 2022-11-01 RX ADMIN — OXYCODONE HYDROCHLORIDE 5 MG: 5 TABLET ORAL at 20:00

## 2022-11-01 RX ADMIN — FENTANYL CITRATE 50 MCG: 50 INJECTION, SOLUTION INTRAMUSCULAR; INTRAVENOUS at 12:30

## 2022-11-01 RX ADMIN — ASPIRIN 325 MG: 325 TABLET, COATED ORAL at 16:44

## 2022-11-01 RX ADMIN — BUPIVACAINE HYDROCHLORIDE AND EPINEPHRINE BITARTRATE 30 ML: 2.5; .005 INJECTION, SOLUTION INFILTRATION; PERINEURAL at 12:42

## 2022-11-01 RX ADMIN — SODIUM CHLORIDE: 9 INJECTION, SOLUTION INTRAVENOUS at 02:30

## 2022-11-01 RX ADMIN — Medication 2 G: at 12:34

## 2022-11-01 RX ADMIN — ACETAMINOPHEN 975 MG: 325 TABLET ORAL at 09:57

## 2022-11-01 RX ADMIN — FENTANYL CITRATE 50 MCG: 50 INJECTION, SOLUTION INTRAMUSCULAR; INTRAVENOUS at 14:40

## 2022-11-01 RX ADMIN — SODIUM CHLORIDE, POTASSIUM CHLORIDE, SODIUM LACTATE AND CALCIUM CHLORIDE: 600; 310; 30; 20 INJECTION, SOLUTION INTRAVENOUS at 16:14

## 2022-11-01 RX ADMIN — PHENYLEPHRINE HYDROCHLORIDE 100 MCG: 10 INJECTION INTRAVENOUS at 13:04

## 2022-11-01 RX ADMIN — FENTANYL CITRATE 25 MCG: 50 INJECTION, SOLUTION INTRAMUSCULAR; INTRAVENOUS at 13:28

## 2022-11-01 RX ADMIN — HYDROMORPHONE HYDROCHLORIDE 0.5 MG: 1 INJECTION, SOLUTION INTRAMUSCULAR; INTRAVENOUS; SUBCUTANEOUS at 08:40

## 2022-11-01 RX ADMIN — ACETAMINOPHEN 975 MG: 325 TABLET ORAL at 18:20

## 2022-11-01 RX ADMIN — FENTANYL CITRATE 50 MCG: 50 INJECTION, SOLUTION INTRAMUSCULAR; INTRAVENOUS at 14:05

## 2022-11-01 RX ADMIN — HYDROMORPHONE HYDROCHLORIDE 0.5 MG: 1 INJECTION, SOLUTION INTRAMUSCULAR; INTRAVENOUS; SUBCUTANEOUS at 03:24

## 2022-11-01 ASSESSMENT — ACTIVITIES OF DAILY LIVING (ADL)
DRESSING/BATHING_DIFFICULTY: NO
ADLS_ACUITY_SCORE: 35
ADLS_ACUITY_SCORE: 22
WEAR_GLASSES_OR_BLIND: YES
CHANGE_IN_FUNCTIONAL_STATUS_SINCE_ONSET_OF_CURRENT_ILLNESS/INJURY: YES
FALL_HISTORY_WITHIN_LAST_SIX_MONTHS: YES
ADLS_ACUITY_SCORE: 35
DIFFICULTY_COMMUNICATING: NO
DOING_ERRANDS_INDEPENDENTLY_DIFFICULTY: NO
ADLS_ACUITY_SCORE: 35
ADLS_ACUITY_SCORE: 35
ADLS_ACUITY_SCORE: 22
CONCENTRATING,_REMEMBERING_OR_MAKING_DECISIONS_DIFFICULTY: NO
HEARING_DIFFICULTY_OR_DEAF: NO
ADLS_ACUITY_SCORE: 22
VISION_MANAGEMENT: GLASSES
NUMBER_OF_TIMES_PATIENT_HAS_FALLEN_WITHIN_LAST_SIX_MONTHS: 1
TOILETING_ISSUES: NO
ADLS_ACUITY_SCORE: 24
WALKING_OR_CLIMBING_STAIRS_DIFFICULTY: OTHER (SEE COMMENTS)
DIFFICULTY_EATING/SWALLOWING: NO
ADLS_ACUITY_SCORE: 22
ADLS_ACUITY_SCORE: 35
ADLS_ACUITY_SCORE: 22
ADLS_ACUITY_SCORE: 35

## 2022-11-01 ASSESSMENT — ENCOUNTER SYMPTOMS
DYSRHYTHMIAS: 0
SEIZURES: 0

## 2022-11-01 NOTE — ANESTHESIA CARE TRANSFER NOTE
Patient: Carli Michelle    Procedure: Procedure(s):  CLOSED REDUCTION, RIGHT HIP WITH PERCUTANEOUS PINNING.       Diagnosis: Closed fracture of neck of right femur, initial encounter (H) [S72.001A]  Diagnosis Additional Information: No value filed.    Anesthesia Type:   General     Note:    Oropharynx: oropharynx clear of all foreign objects and spontaneously breathing  Level of Consciousness: awake  Oxygen Supplementation: face mask  Level of Supplemental Oxygen (L/min / FiO2): 6  Independent Airway: airway patency satisfactory and stable  Dentition: dentition unchanged  Vital Signs Stable: post-procedure vital signs reviewed and stable  Report to RN Given: handoff report given  Patient transferred to: PACU    Handoff Report: Identifed the Patient, Identified the Reponsible Provider, Reviewed the pertinent medical history, Discussed the surgical course, Reviewed Intra-OP anesthesia mangement and issues during anesthesia, Set expectations for post-procedure period and Allowed opportunity for questions and acknowledgement of understanding      Vitals:  Vitals Value Taken Time   /94 11/01/22 1348   Temp 36.3    Pulse 96 11/01/22 1351   Resp 13 11/01/22 1351   SpO2 100 % 11/01/22 1351   Vitals shown include unvalidated device data.    Electronically Signed By: CATHLEEN Thomas CRNA  November 1, 2022  1:52 PM

## 2022-11-01 NOTE — ED TRIAGE NOTES
Patient here with a fall. She stated she fell off 2 steps now having right thigh pain. She denies LOC and denies hitting her head

## 2022-11-01 NOTE — ANESTHESIA PROCEDURE NOTES
Fascia iliaca Procedure Note    Pre-Procedure   Staff -        Anesthesiologist:  Aditya Grant DO       Performed By: anesthesiologist       Location: pre-op       Pre-Anesthestic Checklist: patient identified, IV checked, site marked, risks and benefits discussed, informed consent, monitors and equipment checked, pre-op evaluation, at physician/surgeon's request and post-op pain management  Timeout:       Correct Patient: Yes        Correct Procedure: Yes        Correct Site: Yes        Correct Position: Yes        Correct Laterality: Yes        Site Marked: Yes  Procedure Documentation  Procedure: Fascia iliaca       Laterality: right       Patient Position: supine       Skin prep: Chloraprep       Local skin infiltrated with 1 mL of 1% lidocaine.        Needle Type: insulated       Needle Gauge: 21.        Needle Length (millimeters): 90        Ultrasound guided       1. Ultrasound was used to identify targeted nerve, plexus, vascular marker, or fascial plane and place a needle adjacent to it in real-time.       2. Ultrasound was used to visualize the spread of anesthetic in close proximity to the above referenced structure.       3. A permanent image is entered into the patient's record.       4. The visualized anatomic structures appeared normal.       5. There were no apparent abnormal pathologic findings.    Assessment/Narrative         The placement was negative for: blood aspirated, painful injection and site bleeding       Paresthesias: No.       Bolus given via needle..        Secured via.        Insertion/Infusion Method: Single Shot       Complications: none    Medication(s) Administered   Bupivacaine 0.25% w/ 1:200K Epi (Injection) - Injection   30 mL - 11/1/2022 12:42:00 PM   Comments:  Bolus via needle, 30 ml of 0.25% bupivacaine with 1:200,000 epinephrine.    Under ultrasound guidance, a 21 gauge needle was inserted and placed in close proximity to the fascia iliaca plane. Ultrasound was  "also used to visualize the spread of anesthetic along the fascia iliaca fascial plane in an \"unzipping\" fashion. This region appeared anatomically normal, and there were no apparent abnormal pathological findings. Patient tolerated well under local anesthesia without sedation. A permanent ultrasound image was saved in the patient's record.    The surgeon has given a verbal order transferring care of this patient to me for the performance of regional analgesia block for post op pain control. It is requested of me because I am uniquely trained and qualified to perform this block and the surgeon is neither trained nor qualified to perform this procedure.         FOR [facility] ([facility]) ONLY:   Pain Team Contact information: please page the Pain Team Via [employer]. Search \"Pain\". During daytime hours, please page the attending first. At night please page the resident first.    "

## 2022-11-01 NOTE — ANESTHESIA PREPROCEDURE EVALUATION
Anesthesia Pre-Procedure Evaluation    Patient: Carli Michelle   MRN: [id number] : 1950        Procedure : Procedure(s):  CLOSED REDUCTION, RIGHT HIP WITH PERCUTANEOUS PINNING.          Past Medical History:   Diagnosis Date     Atrophic vaginitis       Past Surgical History:   Procedure Laterality Date     APPENDECTOMY  3/2012      Allergies   Allergen Reactions     Sulfa Drugs       Social History     Tobacco Use     Smoking status: Never     Smokeless tobacco: Never   Substance Use Topics     Alcohol use: Yes     Alcohol/week: 5.8 - 7.5 standard drinks     Types: 7 - 9 Standard drinks or equivalent per week      Wt Readings from Last 1 Encounters:   10/31/22 63.5 kg (140 lb)        Anesthesia Evaluation            ROS/MED HX  ENT/Pulmonary:    (-) asthma and sleep apnea   Neurologic:    (-) no seizures, no CVA and migraines   Cardiovascular:     (+) hypertension----- (-) CHF, arrhythmias, irregular heartbeat/palpitations and dyslipidemia   METS/Exercise Tolerance:     Hematologic:       Musculoskeletal:   (+) fracture, Fracture location: RLE (Acute right femoral neck fracture),     GI/Hepatic:    (-) GERD and liver disease   Renal/Genitourinary:    (-) renal disease   Endo:    (-) Type II DM and thyroid disease   Psychiatric/Substance Use:       Infectious Disease:       Malignancy:       Other:               OUTSIDE LABS:  CBC:   Lab Results   Component Value Date    WBC 8.6 2022    WBC 15.1 (H) 10/31/2022    HGB 13.3 2022    HGB 13.9 10/31/2022    HCT 39.8 2022    HCT 40.6 10/31/2022     2022     10/31/2022     BMP:   Lab Results   Component Value Date     (H) 2022     10/31/2022    POTASSIUM 3.9 2022    POTASSIUM 3.7 10/31/2022    CHLORIDE 113 (H) 2022    CHLORIDE 109 10/31/2022    CO2 26 2022    CO2 26 10/31/2022    BUN 18 2022    BUN 20 10/31/2022    CR 0.96 2022    CR 0.94 10/31/2022    GLC 99 2022      (H) 10/31/2022     COAGS: No results found for: PTT, INR, FIBR  POC: No results found for: BGM, HCG, HCGS  HEPATIC:   Lab Results   Component Value Date    ALBUMIN 4.1 07/03/2013    PROTTOTAL 6.9 07/03/2013    ALT 28 07/03/2013    AST 46 (H) 07/03/2013    ALKPHOS 47 07/03/2013    BILITOTAL 0.7 07/03/2013     OTHER:   Lab Results   Component Value Date    A1C 4.8 10/19/2020    BILL 8.1 (L) 11/01/2022    TSH 2.97 10/17/2006       Anesthesia Plan    ASA Status:  2, emergent    NPO Status:  NPO Appropriate    Anesthesia Type: General.     - Airway: LMA   Induction: Intravenous.   Maintenance: Balanced.        Consents    Anesthesia Plan(s) and associated risks, benefits, and realistic alternatives discussed. Questions answered and patient/representative(s) expressed understanding.    - Discussed:     - Discussed with:  Patient      - Extended Intubation/Ventilatory Support Discussed: No.      - Patient is DNR/DNI Status: No    Use of blood products discussed: Yes.     - Discussed with: Patient.     - Consented: consented to blood products            Reason for refusal: other.     Postoperative Care    Pain management: IV analgesics, Oral pain medications, Multi-modal analgesia.   PONV prophylaxis: Ondansetron (or other 5HT-3), Dexamethasone or Solumedrol, Background Propofol Infusion     Comments:                Aditya Grant DO

## 2022-11-01 NOTE — CONSULTS
[facility]  Orthopaedics/Foot and Ankle Surgery Consultation         Arian Platt MD    Carli Michelle MRN# [id number]   YOB: 1950 Age: 72 year old      Date of Admission:  10/31/2022  Date of Consult: 11/01/2022           Assessment and Plan:   72-year-old female status post mechanical fall onto her right hip last evening with a minimally displaced, valgus impacted right femoral neck fracture.  Operative intervention is recommended to stabilize the fracture, allow for appropriate fracture healing, and promote early weightbearing activity to limit pulmonary and musculoskeletal deconditioning.  The benefits and risks of closed reduction percutaneous screw fixation of the right femoral neck fracture were reviewed at length with the patient and we will proceed to the operating room this afternoon for this procedure.  Hospitalist comanagement is appreciated.  The orthopedic trauma service will continue to monitor the patient's progress while she remains in house.  Further postoperative recommendations will follow in the operative report.              Primary Care Physician:   Houston Menchaca [phone]         Requesting Physician:      Dr. Winter         Chief Complaint:   Right hip fracture.    History is obtained from the patient and medical chart.         History of Present Illness:   Carli Michelle is a 72 year old female with fairly unremarkable past medical history, who presented to the emergency department last evening after sustaining a mechanical fall onto her right hip.  The patient lost her balance while outside watching the trick-or-treaters, and landed directly onto her right hip.  The patient noted immediate pain and difficulty bearing weight on the right lower extremity and presented to the emergency department where radiographs demonstrated a minimally displaced and valgus impacted femoral neck fracture.  The patient was admitted to the  hospitalist service and orthopedics consultation was requested for definitive evaluation and management of her hip fracture.  The patient denies any prior history of problems with the right hip.  The patient lives independently and does not utilize an assistive device with activity.  The patient is an avid cross-country ski year.  The patient denies increased numbness or tingling, or weakness, distally throughout the right lower extremity since the injury.           Past Medical History:     Patient Active Problem List   Diagnosis     Onychomycosis     Pain of left thumb     Cervical radiculopathy     Closed fracture of neck of right femur, initial encounter (H)     Fever in adult      Past Medical History:   Diagnosis Date     Atrophic vaginitis              Past Surgical History:     Past Surgical History:   Procedure Laterality Date     APPENDECTOMY  3/2012            Home Medications:     Prior to Admission medications    Medication Sig Last Dose Taking? Auth Provider Long Term End Date   calcium carbonate 750 MG CHEW Take 1 chew tab by mouth daily  10/31/2022 at am Yes Reported, Patient     Multiple Vitamins-Minerals (MULTIVITAMIN WOMEN 50+ PO) Take 1 tablet by mouth daily 10/31/2022 at am Yes Reported, Patient     Multiple Vitamins-Minerals (PRESERVISION AREDS 2 PO) Take 1 capsule by mouth daily 10/31/2022 at am Yes Reported, Patient              Current Medications:           [Auto Hold] acetaminophen  975 mg Oral Q8H     ceFAZolin  2 g Intravenous Pre-Op/Pre-procedure x 1 dose     ceFAZolin  2 g Intravenous See Admin Instructions     [Auto Hold] sodium chloride (PF)  3 mL Intracatheter Q8H     tranexamic acid  1 g Intravenous Once     tranexamic acid  1 g Intravenous Once     [Auto Hold] HYDROmorphone, [Auto Hold] lidocaine 4%, [Auto Hold] lidocaine (buffered or not buffered), lidocaine (buffered or not buffered), [Auto Hold] melatonin, [Auto Hold] ondansetron **OR** [Auto Hold] ondansetron, [Auto Hold]  "oxyCODONE, [Auto Hold] polyethylene glycol, [Auto Hold] prochlorperazine **OR** [Auto Hold] prochlorperazine **OR** [Auto Hold] prochlorperazine, [Auto Hold] senna-docusate **OR** [Auto Hold] senna-docusate, [Auto Hold] sodium chloride (PF)         Allergies:     Allergies   Allergen Reactions     Sulfa Drugs             Social History:     Social History     Tobacco Use     Smoking status: Never     Smokeless tobacco: Never   Substance Use Topics     Alcohol use: Yes     Alcohol/week: 5.8 - 7.5 standard drinks     Types: 7 - 9 Standard drinks or equivalent per week             Family History:     Family History   Problem Relation Age of Onset     Breast Cancer Mother 40     Glaucoma Mother      Alzheimer Disease Father 82     Diabetes Father      Cancer Maternal Grandmother 68        unknown cancer     Breast Cancer Maternal Aunt      Glaucoma Maternal Aunt               Review of Systems:   The 10 point Review of Systems is negative other than noted in the HPI            Physical Exam:   Blood pressure 116/58, pulse 57, temperature 98.8  F (37.1  C), temperature source Temporal, resp. rate 16, height 1.499 m (4' 11\"), weight 63.5 kg (140 lb), SpO2 98 %, not currently breastfeeding.  140 lbs 0 oz    Constitutional:   Awake, alert, cooperative, no apparent distress, and appears stated age.     Lungs:   No increased work of breathing, good air exchange.     Musculoskeletal:   There is no significant deformity of the right lower extremity on examination today.  There is no evidence of significant skin injury, ecchymosis, or swelling around the lateral thigh.  Logroll of the right hip is deferred in light of the patient's known injury.  There is no tenderness to palpation distal to the mid-thigh.  The patient demonstrates full strength with dorsiflexion and plantarflexion of the ankle.  Sensation is fully intact in superficial peroneal, deep peroneal, and plantar nerve distributions.  Toes are warm and well-perfused " with a palpable dorsalis pedis pulse.              Data:   All new lab and imaging data was reviewed.  Radiographs of the right hip and pelvis obtained in the emergency department yesterday were personally reviewed without limitation and demonstrate a minimally displaced, valgus impacted right femoral neck fracture.  There is no evidence of other acute osseous injury.  There are no significant degenerative changes throughout the bilateral hip joints.  Results for orders placed or performed during the hospital encounter of 10/31/22 (from the past 24 hour(s))   XR Pelvis w Hip Right 1 View    Narrative    EXAM: XR PELVIS AND HIP RIGHT 1 VIEW  LOCATION: [facility]  DATE/TIME: 10/31/2022 11:28 PM    INDICATION: pain after trauma  COMPARISON: None.      Impression    IMPRESSION: Mildly angulated right femoral neck fracture.   CBC with platelets + differential    Narrative    The following orders were created for panel order CBC with platelets + differential.  Procedure                               Abnormality         Status                     ---------                               -----------         ------                     CBC with platelets and d...[[id number]]  Abnormal            Final result                 Please view results for these tests on the individual orders.   Basic metabolic panel   Result Value Ref Range    Sodium 142 133 - 144 mmol/L    Potassium 3.7 3.4 - 5.3 mmol/L    Chloride 109 94 - 109 mmol/L    Carbon Dioxide (CO2) 26 20 - 32 mmol/L    Anion Gap 7 3 - 14 mmol/L    Urea Nitrogen 20 7 - 30 mg/dL    Creatinine 0.94 0.52 - 1.04 mg/dL    Calcium 9.1 8.5 - 10.1 mg/dL    Glucose 106 (H) 70 - 99 mg/dL    GFR Estimate 64 >60 mL/min/1.73m2   CBC with platelets and differential   Result Value Ref Range    WBC Count 15.1 (H) 4.0 - 11.0 10e3/uL    RBC Count 4.46 3.80 - 5.20 10e6/uL    Hemoglobin 13.9 11.7 - 15.7 g/dL    Hematocrit 40.6 35.0 - 47.0 %    MCV 91 78 - 100 fL    MCH  31.2 26.5 - 33.0 pg    MCHC 34.2 31.5 - 36.5 g/dL    RDW 13.1 10.0 - 15.0 %    Platelet Count 219 150 - 450 10e3/uL    % Neutrophils 78 %    % Lymphocytes 13 %    % Monocytes 7 %    % Eosinophils 0 %    % Basophils 1 %    % Immature Granulocytes 1 %    NRBCs per 100 WBC 0 <1 /100    Absolute Neutrophils 11.8 (H) 1.6 - 8.3 10e3/uL    Absolute Lymphocytes 1.9 0.8 - 5.3 10e3/uL    Absolute Monocytes 1.1 0.0 - 1.3 10e3/uL    Absolute Eosinophils 0.0 0.0 - 0.7 10e3/uL    Absolute Basophils 0.1 0.0 - 0.2 10e3/uL    Absolute Immature Granulocytes 0.1 <=0.4 10e3/uL    Absolute NRBCs 0.0 10e3/uL   Asymptomatic COVID-19 Virus (Coronavirus) by PCR Nasopharyngeal    Specimen: Nasopharyngeal; Swab   Result Value Ref Range    SARS CoV2 PCR Negative Negative    Narrative    Testing was performed using the [employer] Xpress SARS-CoV-2 Assay on the   [employer]. Additional information about   this Emergency Use Authorization (EUA) assay can be found via the Lab   Guide. This test should be ordered for the detection of SARS-CoV-2 in   individuals who meet SARS-CoV-2 clinical and/or epidemiological   criteria. Test performance is unknown in asymptomatic patients. This   test is for in vitro diagnostic use under the FDA EUA for   laboratories certified under CLIA to perform high complexity testing.   This test has not been FDA cleared or approved. A negative result   does not rule out the presence of PCR inhibitors in the specimen or   target RNA in concentration below the limit of detection for the   assay. The possibility of a false negative should be considered if   the patient's recent exposure or clinical presentation suggests   COVID-19. This test was validated by the [facility]. This laboratory is certified under the Clinical Laboratory Improvement Amendments of 1988 (CLIA-88) as qualified to perform high complexity laboratory testing.     EKG 12 lead   Result Value Ref  Range    Systolic Blood Pressure  mmHg    Diastolic Blood Pressure  mmHg    Ventricular Rate 78 BPM    Atrial Rate 78 BPM    WI Interval 128 ms    QRS Duration 76 ms     ms    QTc 405 ms    P Axis 51 degrees    R AXIS 54 degrees    T Axis 28 degrees    Interpretation ECG       Sinus rhythm with occasional Premature ventricular complexes  Otherwise normal ECG  No previous ECGs available  Confirmed by GENERATED REPORT, COMPUTER ([id number]),  ChanelHomar iveyelle ([id number]) on 11/1/2022 12:27:40 AM     CBC with platelets differential    Narrative    The following orders were created for panel order CBC with platelets differential.  Procedure                               Abnormality         Status                     ---------                               -----------         ------                     CBC with platelets and d...[[id number]]                      Final result                 Please view results for these tests on the individual orders.   Basic metabolic panel   Result Value Ref Range    Sodium 145 (H) 133 - 144 mmol/L    Potassium 3.9 3.4 - 5.3 mmol/L    Chloride 113 (H) 94 - 109 mmol/L    Carbon Dioxide (CO2) 26 20 - 32 mmol/L    Anion Gap 6 3 - 14 mmol/L    Urea Nitrogen 18 7 - 30 mg/dL    Creatinine 0.96 0.52 - 1.04 mg/dL    Calcium 8.1 (L) 8.5 - 10.1 mg/dL    Glucose 99 70 - 99 mg/dL    GFR Estimate 62 >60 mL/min/1.73m2   CBC with platelets and differential   Result Value Ref Range    WBC Count 8.6 4.0 - 11.0 10e3/uL    RBC Count 4.26 3.80 - 5.20 10e6/uL    Hemoglobin 13.3 11.7 - 15.7 g/dL    Hematocrit 39.8 35.0 - 47.0 %    MCV 93 78 - 100 fL    MCH 31.2 26.5 - 33.0 pg    MCHC 33.4 31.5 - 36.5 g/dL    RDW 13.2 10.0 - 15.0 %    Platelet Count 214 150 - 450 10e3/uL    % Neutrophils 51 %    % Lymphocytes 36 %    % Monocytes 11 %    % Eosinophils 1 %    % Basophils 1 %    % Immature Granulocytes 0 %    NRBCs per 100 WBC 0 <1 /100    Absolute Neutrophils 4.4 1.6 - 8.3 10e3/uL    Absolute Lymphocytes 3.1  0.8 - 5.3 10e3/uL    Absolute Monocytes 0.9 0.0 - 1.3 10e3/uL    Absolute Eosinophils 0.1 0.0 - 0.7 10e3/uL    Absolute Basophils 0.0 0.0 - 0.2 10e3/uL    Absolute Immature Granulocytes 0.0 <=0.4 10e3/uL    Absolute NRBCs 0.0 10e3/uL   XR Chest 2 Views    Narrative    CHEST TWO VIEWS November 1, 2022 9:45 AM     HISTORY: Fever.    COMPARISON: None.       Impression    IMPRESSION: There are no acute infiltrates. The cardiac silhouette is  not enlarged. Pulmonary vasculature is unremarkable.    LILIANA HALL MD         SYSTEM ID:  [id number]   UA with Microscopic   Result Value Ref Range    Color Urine Light Yellow Colorless, Straw, Light Yellow, Yellow    Appearance Urine Clear Clear    Glucose Urine Negative Negative mg/dL    Bilirubin Urine Negative Negative    Ketones Urine Negative Negative mg/dL    Specific Gravity Urine 1.016 1.003 - 1.035    Blood Urine Negative Negative    pH Urine 5.5 5.0 - 7.0    Protein Albumin Urine Negative Negative mg/dL    Urobilinogen Urine Normal Normal, 2.0 mg/dL    Nitrite Urine Negative Negative    Leukocyte Esterase Urine Trace (A) Negative    Mucus Urine Present (A) None Seen /LPF    RBC Urine 1 <=2 /HPF    WBC Urine 6 (H) <=5 /HPF    Squamous Epithelials Urine 1 <=1 /HPF

## 2022-11-01 NOTE — ED NOTES
Hand off given to barbara OTOOLE.  Cynthia Aponte RN,.......................................... 11/1/2022   7:15 AM

## 2022-11-01 NOTE — H&P
[facility]    History and Physical  Hospitalist       Date of Admission:  10/31/2022  Date of Service (when I saw the patient): 11/01/22    ASSESSMENT  Carli Michelle is a markedly pleasant 72 year old woman non-smoker with past medical history that is most notable for hypertension, who presents with mechanical fall causing acute right femoral neck fracture.    PLAN    Acute right femoral neck fracture: Seen on x-rays.    -- Inpatient. NPO. Bedrest. Orthopedics consulted. IV narcotics and antiemetics as needed for pain or nausea. 100 ml/hour NS IV fluid ordered.    Sunitha-operative Risk Assessment: RCRI 0 with Functional Status greater than 4 METS going for intermediate risk procedure for CV events. EKG shows NSR without ST segment changes or Q waves. Recommend to proceed to urgent surgery without further cardiac intervention or testing.    Hypertension: She reports this as borderline hypertension, which is being monitored as an outpatient, not on medications currently. Noted    Fever: Low grade, without other evident signs or symptoms of acute infection. Could be due to trauma. COVID PCR is negative.    -- UA and CXR ordered.    -- Monitor fever curve    Rule Out COVID-19 infection  This patient was evaluated during a global COVID-19 pandemic, which necessitated consideration that the patient might be at risk for infection with the SARS-CoV-2 virus that causes COVID-19. Applicable protocols for evaluation were followed during the patient's care.   -negative COVID-19 PCR test result  -no current indication for precautions    Chief Complaint   Right hip pain    History is obtained from the patient, her  at the bedside, and the ED physician whom I have spoken with    History of Present Illness   Carli Michelle is a markedly pleasant 72 year old woman who presents with acute onset sharp, severe pain in the right hip, radiating down the leg, that started immediately after she  "fell on it tonight. She says that she was out on her stoop outside, which has two stone steps, and tripped and fell, landing on the right leg. The pain has been constant since onset, and greatly exacerbated by any effort at moving the leg or bearing weight on it. Dilaudid given in the ED has significantly relieved the pain. She adds that she is normally a very active person who cross country skis every winter, without ever getting chest pain during exercise. She reports having had a negative DEXA scan about 5 years ago. She drinks one alcoholic beverage each evening with dinner, often a cocktail; tonight, she drank 4 ounces of beer prior to her fall. She often gets rhinorrhea in the winter time and has been experiencing that recently. She otherwise denies any current or recent fever, chills, sweats, dyspnea, nausea, dysuria, diarrhea, abdominal pain, or any other acute complaints.    In the ED,   10/31 2219 168/73  100.4  F  70 18 99 %     CBC and BMP were notable for WBC 15.1, Glucose 106, otherwise were within the normal reference range. COVID PCR was negative. EKG showed NSR with PVC's, without ST segment changes or Q waves.    Recent Results (from the past 24 hour(s))   XR Pelvis w Hip Right 1 View    Narrative    EXAM: XR PELVIS AND HIP RIGHT 1 VIEW  LOCATION: [facility]  DATE/TIME: 10/31/2022 11:28 PM    INDICATION: pain after trauma  COMPARISON: None.      Impression    IMPRESSION: Mildly angulated right femoral neck fracture.       PHYSICAL EXAM  Blood pressure (!) 153/71, pulse 70, temperature 100.4  F (38  C), temperature source Oral, resp. rate 20, height 1.499 m (4' 11\"), weight 63.5 kg (140 lb), SpO2 99 %, not currently breastfeeding.  Constitutional: Alert and oriented to person, place and time; no apparent distress  HEENT: normocephalic moist mucus membranes  Respiratory: lungs clear to auscultation bilaterally  Cardiovascular: regular S1 S2  GI: abdomen soft non tender non " distended bowel sounds positive  Lymph/Hematologic: no pallor, no cervical lymphadenopathy  Skin: no rash, good turgor  Musculoskeletal: no clubbing, cyanosis or edema in either LE  Neurologic: extra-ocular muscles intact; moves all four extremities  Psychiatric: appropriate affect, insight and judgment     DVT Prophylaxis: Pneumatic Compression Devices  Code Status: Full Code    Disposition: Expected discharge in 3-5 days    Rico Winter MD, MD    Past Medical History    I have reviewed this patient's medical history and updated it with pertinent information if needed.   Past Medical History:   Diagnosis Date     Atrophic vaginitis        Past Surgical History   I have reviewed this patient's surgical history and updated it with pertinent information if needed.  Past Surgical History:   Procedure Laterality Date     APPENDECTOMY  3/2012       Prior to Admission Medications   Prior to Admission Medications   Prescriptions Last Dose Informant Patient Reported? Taking?   Multiple Vitamins-Minerals (MULTIVITAMIN WOMEN 50+ PO)  Self Yes No   Sig: Take 1 tablet by mouth daily   Multiple Vitamins-Minerals (PRESERVISION AREDS 2 PO)  Self Yes No   Sig: Take 1 capsule by mouth daily   calcium carbonate 750 MG CHEW  Self Yes No   Sig: Take 1 chew tab by mouth daily       Facility-Administered Medications: None     Allergies   Allergies   Allergen Reactions     Sulfa Drugs        Social History   I have reviewed this patient's social history and updated it with pertinent information if needed. Carli STEPHENS Miguel Angel  reports that she has never smoked. She has never used smokeless tobacco. She reports current alcohol use of about 5.8 - 7.5 standard drinks per week. She reports that she does not use drugs.    Family History   Family history assessed and, except as above, is non-contributory.    Family History   Problem Relation Age of Onset     Breast Cancer Mother 40     Glaucoma Mother      Alzheimer Disease Father 82      Diabetes Father      Cancer Maternal Grandmother 68        unknown cancer     Breast Cancer Maternal Aunt      Glaucoma Maternal Aunt        Review of Systems   The 10 point Review of Systems is negative other than noted in the HPI or here.     Primary Care Physician   Houston Menchaca    Data   Labs Ordered and Resulted from Time of ED Arrival to Time of ED Departure - No data to display    Data reviewed today:  I personally reviewed the EKG tracing showing NSR with PVC's, without ST segment changes or Q waves.

## 2022-11-01 NOTE — ED NOTES
"[facility]  ED Nurse Handoff Report    ED Chief complaint: Fall      ED Diagnosis:   Final diagnoses:   Closed fracture of neck of right femur, initial encounter (H)   Fever in adult       Code Status: Full Code    Allergies:   Allergies   Allergen Reactions    Sulfa Drugs        Patient Story:   Pt presented to ED from home via EMS c/o R hip pain. Reportedly pt fell about two steps on to her R hip. Pt denies any LOC or hitting her head following fall.   On scene pt was unable to bare any weight on R leg d/t severe pain with any movement.  Pt received 50 mcg Fentanyl IVP with partial pain relief.    Focused Assessment:    A+O x4  SBP's 150's 160's.  > 95% RA  R hip pain  Low grade fever    Treatments and/or interventions provided:   0.5 MG Dilaudid X2    Patient's response to treatments and/or interventions:   Partial pain relief.    To be done/followed up on inpatient unit:    Continue with plan of care    Does this patient have any cognitive concerns?:  none    Activity level - Baseline/Home:  Independent  Activity Level - Current:   Unknown    Patient's Preferred language: English   Needed?: No    Isolation: None  Infection: Not Applicable  Patient tested for COVID 19 prior to admission: YES  Bariatric?: No    Vital Signs:   Vitals:    10/31/22 2219 10/31/22 2334   BP: (!) 168/73 (!) 153/71   BP Location:  Left arm   Pulse: 70    Resp: 18 20   Temp: 100.1  F (37.8  C) 100.4  F (38  C)   TempSrc: Oral Oral   SpO2: 99% 99%   Weight: 63.5 kg (140 lb)    Height: 1.499 m (4' 11\")        Cardiac Rhythm:     Was the PSS-3 completed:   Yes  What interventions are required if any?               Family Comments: Spouse at bedside in ED.  OBS brochure/video discussed/provided to patient/family: Yes    For the majority of the shift this patient's behavior was Green.   Behavioral interventions performed were None.    ED NURSE PHONE NUMBER: [id number]       "

## 2022-11-01 NOTE — ANESTHESIA POSTPROCEDURE EVALUATION
Patient: Carli Michelle    Procedure: Procedure(s):  CLOSED REDUCTION, RIGHT HIP WITH PERCUTANEOUS PINNING.       Anesthesia Type:  General    Note:  Disposition: Inpatient   Postop Pain Control: Uneventful            Sign Out: Well controlled pain   PONV: No   Neuro/Psych: Uneventful            Sign Out: Acceptable/Baseline neuro status   Airway/Respiratory: Uneventful            Sign Out: Acceptable/Baseline resp. status   CV/Hemodynamics: Uneventful            Sign Out: Acceptable CV status; No obvious hypovolemia; No obvious fluid overload   Other NRE: NONE   DID A NON-ROUTINE EVENT OCCUR? No           Last vitals:  Vitals Value Taken Time   /63 11/01/22 1520   Temp 36.5  C (97.7  F) 11/01/22 1350   Pulse 58 11/01/22 1527   Resp 34 11/01/22 1528   SpO2 97 % 11/01/22 1527   Vitals shown include unvalidated device data.    Electronically Signed By: Aditya Grant DO  November 1, 2022  3:31 PM

## 2022-11-01 NOTE — ED PROVIDER NOTES
"  History     Chief Complaint:  Fall       HPI   Carli Michelle is a 72 year old female without significant past medical history presenting for evaluation of right hip pain after she missed a step and fell onto her right side.  She denies any her head, denies any neck pain.  She was not able to get up on her own, she arrives with her right leg slightly bent at the knee propped up on pillows.  She denies any blood thinner use, she was noted to have a fever however denies any recent infectious symptoms including fever chills urinary symptoms, cough or shortness of breath.  She denies any abdominal pain    ROS:  Review of Systems  10 point ROS completed, negative except as indicated in the HPI.       Allergies:  Sulfa Drugs     Medications:    calcium carbonate 750 MG CHEW  Multiple Vitamins-Minerals (MULTIVITAMIN WOMEN 50+ PO)  Multiple Vitamins-Minerals (PRESERVISION AREDS 2 PO)        Past Medical History:    Past Medical History:   Diagnosis Date     Atrophic vaginitis        Past Surgical History:    Past Surgical History:   Procedure Laterality Date     APPENDECTOMY  3/2012        Family History:    family history includes Alzheimer Disease (age of onset: 82) in her father; Breast Cancer in her maternal aunt; Breast Cancer (age of onset: 40) in her mother; Cancer (age of onset: 68) in her maternal grandmother; Diabetes in her father; Glaucoma in her maternal aunt and mother.    Social History:   reports that she has never smoked. She has never used smokeless tobacco. She reports current alcohol use of about 5.8 - 7.5 standard drinks per week. She reports that she does not use drugs.  PCP: [place] [facility]     Physical Exam     Patient Vitals for the past 24 hrs:   BP Temp Temp src Pulse Resp SpO2 Height Weight   10/31/22 2334 (!) 153/71 100.4  F (38  C) Oral -- 20 99 % -- --   10/31/22 2219 (!) 168/73 100.1  F (37.8  C) Oral 70 18 99 % 1.499 m (4' 11\") 63.5 kg (140 lb)        Physical Exam  Constitutional: " Alert, attentive, GCS 15   HENT:   Head: No occipital or scalp hematoma  Eyes: EOM are normal, anicteric, conjugate gaze  CV: distal extremities warm, well perfused  Chest: Non-labored breathing on RA  GI:  non tender. No distension. No guarding or rebound.    MSK: Proximal right upper leg tenderness, no deformity distal sensation intact  Neurological: Alert, attentive, moving all extremities equally.   Skin: Skin is warm and dry.        Emergency Department Course   ECG:  ECG results from 10/31/22   EKG 12 lead     Value    Systolic Blood Pressure     Diastolic Blood Pressure     Ventricular Rate 78    Atrial Rate 78    OK Interval 128    QRS Duration 76        QTc 405    P Axis 51    R AXIS 54    T Axis 28    Interpretation ECG      Sinus rhythm with occasional Premature ventricular complexes  Otherwise normal ECG  No previous ECGs available  Confirmed by GENERATED REPORT, COMPUTER ([id number]),  Abida Hammond ([id number]) on 11/1/2022 12:27:40 AM         Imaging:  XR Pelvis w Hip Right 1 View   Final Result   IMPRESSION: Mildly angulated right femoral neck fracture.         Report per radiology    Laboratory:  Labs Ordered and Resulted from Time of ED Arrival to Time of ED Departure   BASIC METABOLIC PANEL - Abnormal       Result Value    Sodium 142      Potassium 3.7      Chloride 109      Carbon Dioxide (CO2) 26      Anion Gap 7      Urea Nitrogen 20      Creatinine 0.94      Calcium 9.1      Glucose 106 (*)     GFR Estimate 64     CBC WITH PLATELETS AND DIFFERENTIAL - Abnormal    WBC Count 15.1 (*)     RBC Count 4.46      Hemoglobin 13.9      Hematocrit 40.6      MCV 91      MCH 31.2      MCHC 34.2      RDW 13.1      Platelet Count 219      % Neutrophils 78      % Lymphocytes 13      % Monocytes 7      % Eosinophils 0      % Basophils 1      % Immature Granulocytes 1      NRBCs per 100 WBC 0      Absolute Neutrophils 11.8 (*)     Absolute Lymphocytes 1.9      Absolute Monocytes 1.1      Absolute  Eosinophils 0.0      Absolute Basophils 0.1      Absolute Immature Granulocytes 0.1      Absolute NRBCs 0.0     COVID-19 VIRUS (CORONAVIRUS) BY PCR   ROUTINE UA WITH MICROSCOPIC        Emergency Department Course:  Reviewed:  I reviewed nursing notes, vitals and past medical history    Assessments:  2350 I obtained history and examined the patient as noted above.   1209 I spoke with Dr. Winter, hospitalist.     Consults:   None    Interventions:  Medications   HYDROmorphone (PF) (DILAUDID) injection 0.5 mg (0.5 mg Intravenous Given 10/31/22 1205)        Disposition:  The patient was admitted to the hospital under the care of Dr. Winter.     Impression & Plan    Medical Decision Making:  Previously healthy 72-year-old presenting for mechanical fall after she missed a step landing on her right side with acute right proximal leg pain.  She was not able to ambulate at scene.  She denies hitting her head, has no neck pain, chest pain or abdominal discomfort.  X-rays confirm minimally displaced femoral neck fracture.  She was also noted to have a low-grade temperature here, has had no recent infectious symptoms, COVID testing pending, will plan for UA.  She has no pulmonary complaints, chest x-ray deferred.  Will admit to medicine with orthopedic consultation in the morning.    Diagnosis:    ICD-10-CM    1. Closed fracture of neck of right femur, initial encounter (H)  S72.001A       2. Fever in adult  R50.9          Feliz Maria MD  [employer]  12:49 AM 11/01/22          Feliz Maria MD  11/01/22 0051

## 2022-11-01 NOTE — ANESTHESIA PROCEDURE NOTES
Airway       Patient location during procedure: OR       Procedure Start/Stop Times: 11/1/2022 12:31 PM  Staff -        Anesthesiologist:  Aditya Grant DO       CRNA: Michael Barrow APRN CRNA       Performed By: CRNAIndications and Patient Condition       Indications for airway management: bri-procedural       Induction type:intravenous       Mask difficulty assessment: 0 - not attempted    Final Airway Details       Final airway type: supraglottic airway    Supraglottic Airway Details        Type: LMA       Brand: Ambu AuraGain       LMA size: 3    Post intubation assessment        Placement verified by: capnometry, equal breath sounds and chest rise        Number of attempts at approach: 1       Secured with: pink tape       Ease of procedure: easy       Dentition: Intact and Unchanged    Medication(s) Administered   Medication Administration Time: 11/1/2022 12:31 PM

## 2022-11-01 NOTE — CONSULTS
Brief Ortho Consult Note    Consult request received.  XR and chart reviewed.  73 y/o F s/p mechanical fall with R minimally valgus angulated and impacted femoral neck fracture.  Amenable to CRPP.  Will plan to proceed to the OR this afternoon barring any other concerning findings given low-grade fever.  Keep NPO.  Formal consult note to follow.

## 2022-11-01 NOTE — PLAN OF CARE
Pt up to floor around 920am. A&Ox4. Bedrest. NPO since midnight. VSS on RA. Reports no void since arrival to hospital last night, PVR 700cc. Patient able to void in seated position and UA collected. Chest xray also obtained. PIV running LR at 75cc/hr. IV dilaudid given x1 for pain in addition to scheduled tylenol. Denies N/T. +2 DP/PT pulses. Report given face to face to pre-op RN who brought patient down for ORIF surgery around 1120am.

## 2022-11-01 NOTE — PROGRESS NOTES
.RECEIVING UNIT ED HANDOFF REVIEW    ED Nurse Handoff Report was reviewed by: Radha Clifford RN on November 1, 2022 at 8:27 AM

## 2022-11-01 NOTE — PROGRESS NOTES
"Brief IM prog - late entry note.    Patient admitted earlier this AM by my colleague. Has been off floor in OR for most of day. Did not formally see. Transferred to floor late afternoon / early evening and appears to have tolerated well with stable vitals.    Did have low grade fever 100.4 on presentation with no focal sxs infection and negative ua and cxr.    She is now s/p closed reduction and percutaneous screw fixation for treatment acute right femoral neck fx.     Assessment & Plan   Carli Michelle is a 72 year old female admitted on 10/31/2022. PMHx HTN who presented for eval after mechanical fall found to have acute right femoral neck fracture.     Acute minimally displaced, valgus impacted right femoral neck fracture s/p closed reduction and percutaneous screw fixation (11/1/2022).  -- Inpatient.  -- Appreciate ortho consult.  -- WBAT with assistive device.  -- ASA 325mg DVT PPX per ortho.  -- FU with [facility] Dr. Platt in 2 weeks for wound check, staple removal, and AP pelvis and right hip XRs.    Hypernatremia, hyperchloremia.  Mild and likely related to NS initiated in ED.  - Switched to LR while NPO and post operatively, discontinue when tolerating PO.     Hypertension.  Reports this as borderline hypertension, which is being monitored as an outpatient, not on medications currently.  -- Monitor.     Fever.  Low grade measuring 100.4 without other evident signs or symptoms of acute infection. COVID PCR is negative. UA and CXR unrevealing.  -- Monitor fever curve. Suspect related to acute trauma.     Admission COVID-19 screening. Negative PCR 10/31.    Clinically Significant Risk Factors Present on Admission                      # Overweight: Estimated body mass index is 28.28 kg/m  as calculated from the following:    Height as of this encounter: 1.499 m (4' 11\").    Weight as of this encounter: 63.5 kg (140 lb).         Diet: NPO for Medical/Clinical Reasons Except for: Meds, Ice Chips    Eldridge " Catheter: Not present    DVT Prophylaxis: Pneumatic Compression Devices and ASA 325mg.  Code Status: Prior       Expected Discharge Date: 11/03/2022               The patient's care was discussed with the Attending Physician, Dr. Causey. Hospitalist service will formally see 11/2.    JoAnna K. Barthell, PA-C  Hospitalist Service  [facility]

## 2022-11-01 NOTE — OP NOTE
PREOPERATIVE DIAGNOSIS: Right minimally displaced, valgus impacted femoral neck fracture.    POSTOPERATIVE DIAGNOSIS: Right minimally displaced, valgus impacted femoral neck fracture.    PROCEDURE: Closed reduction, percutaneous screw fixation, right femoral neck fracture.    SURGEON: Arian Platt MD.    ASSISTANT: Koffi Sesay MD, fellow.  SECONDARY ASSISTANT: Uzair Martin PA-C.    ANESTHESIA: General.     ESTIMATED BLOOD LOSS: 20mL.     IMPLANTS: Synthes 6.5 mm partially threaded cannulated screws x3.     COMPLICATIONS: None apparent.    Skilled surgical assistants, Koffi Sesay MD, and Uzair Martin PA-C, were necessary and utilized during the case to assist with patient positioning, prepping and draping, soft tissue retraction and completing the fracture fixation, wound closure, and dressing application.  The assistants were utilized throughout the entire case.    INDICATIONS: Carli is a pleasant 72-year-old female who sustained a mechanical fall onto the right hip. The patient was unable to bear weight on the leg following the injury and presented to the Emergency Department. Radiographs demonstrated a minimally displaced, impacted femoral neck fracture. To allow for more rapid return to weightbearing activity and limit pulmonary and musculoskeletal deconditioning associated with prolonged bed rest, operative intervention was recommended to stabilize the fracture. Given the limited displacement and angulation of the fracture, closed reduction and percutaneous screw fixation was offered as opposed to a hemiarthroplasty procedure. The risks of avascular necrosis to the femoral head and malunion/nonunion were discussed with the patient and family.  The benefits and risks of surgery were discussed in full with the patient and family and the patient provided informed consent to proceed.    DESCRIPTION OF PROCEDURE: The patient was identified in the preoperative holding area on the date of  surgery. The operative site was marked with indelible marker and the patient was brought back to the operating room and transferred to the [place] table in a supine position after successful administration of anesthesia. The left lower extremity was placed into gentle extension. The right lower extremity was placed into the traction device. Fluoroscopic imaging confirmed appropriate reduction of the right femoral neck fracture on both AP and lateral views. The right hip was prepped and draped in standard sterile fashion. A preoperative timeout was performed, identifying the correct patient, procedure, operative site, antibiotic administration and equipment necessary for the procedure.    Under fluoroscopic guidance, a guidewire was advanced in percutaneous fashion through the lateral thigh and advancing to the lateral cortex of the femur just proximal to the level of the lesser trochanter. A skin incision was made directly over the wire and soft tissue dissection was carefully carried down through the IT band fascia. The wire was then advanced across the fracture site into the femoral head. Two additional wires were advanced under fluoroscopic guidance in an inverted triangle arrangement. AP and lateral images confirmed appropriate positioning of the guidewires without overpenetration into the hip joint. After overdrilling the outer cortex over each wire, Synthes 6.5 mm partially threaded cannulated screws were advanced over each guidewire across the fracture site. Adequate compression was noted across the fracture with appropriate positioning of the screws maintaining adequate tip-apex distance at the femoral head and limiting penetration into the hip joint. The fracture remained stable during screw fixation. Fluoroscopic imaging confirmed appropriate length and positioning of all 3 screws. The fracture remained in a reduced position without angulation or displacement. Around the world views were performed to confirm  appropriate positioning of the screws. The wound was copiously irrigated. The IT band fascia and deep tissue were reapproximated with 2-0 Vicryl suture. Subcutaneous tissues and skin were reapproximated with interrupted 3-0 Monocryl and staples, respectively. Xeroform and a sterile dressing was applied. The patient was extubated and brought to the PACU in stable condition for further postoperative care.    Postoperatively, the patient will be allowed to bear weight on the right hip with an assistive device. The patient will begin work with therapy tomorrow. The patient will be placed on aspirin for DVT prophylaxis postoperatively. The patient will plan return to clinic for followup in 2 weeks for repeat evaluation including wound check, staple removal and AP pelvis and right hip x-rays.    Of note, all counts were correct at the conclusion of the case.    PAL BOB MD

## 2022-11-02 ENCOUNTER — APPOINTMENT (OUTPATIENT)
Dept: PHYSICAL THERAPY | Facility: CLINIC | Age: 72
DRG: 481 | End: 2022-11-02
Attending: PHYSICIAN ASSISTANT
Payer: MEDICARE

## 2022-11-02 ENCOUNTER — APPOINTMENT (OUTPATIENT)
Dept: PHYSICAL THERAPY | Facility: CLINIC | Age: 72
DRG: 481 | End: 2022-11-02
Payer: MEDICARE

## 2022-11-02 LAB
ANION GAP SERPL CALCULATED.3IONS-SCNC: 4 MMOL/L (ref 3–14)
BUN SERPL-MCNC: 17 MG/DL (ref 7–30)
CALCIUM SERPL-MCNC: 8.4 MG/DL (ref 8.5–10.1)
CHLORIDE BLD-SCNC: 112 MMOL/L (ref 94–109)
CO2 SERPL-SCNC: 26 MMOL/L (ref 20–32)
CREAT SERPL-MCNC: 1.08 MG/DL (ref 0.52–1.04)
GFR SERPL CREATININE-BSD FRML MDRD: 54 ML/MIN/1.73M2
GLUCOSE BLD-MCNC: 94 MG/DL (ref 70–99)
GLUCOSE BLDC GLUCOMTR-MCNC: 105 MG/DL (ref 70–99)
HGB BLD-MCNC: 12.3 G/DL (ref 11.7–15.7)
POTASSIUM BLD-SCNC: 4.2 MMOL/L (ref 3.4–5.3)
SODIUM SERPL-SCNC: 142 MMOL/L (ref 133–144)

## 2022-11-02 PROCEDURE — 250N000011 HC RX IP 250 OP 636: Performed by: PHYSICIAN ASSISTANT

## 2022-11-02 PROCEDURE — 82310 ASSAY OF CALCIUM: CPT | Performed by: PHYSICIAN ASSISTANT

## 2022-11-02 PROCEDURE — 250N000013 HC RX MED GY IP 250 OP 250 PS 637: Performed by: HOSPITALIST

## 2022-11-02 PROCEDURE — 250N000013 HC RX MED GY IP 250 OP 250 PS 637: Performed by: PHYSICIAN ASSISTANT

## 2022-11-02 PROCEDURE — 97161 PT EVAL LOW COMPLEX 20 MIN: CPT | Mod: GP | Performed by: PHYSICAL THERAPIST

## 2022-11-02 PROCEDURE — 85018 HEMOGLOBIN: CPT | Performed by: PHYSICIAN ASSISTANT

## 2022-11-02 PROCEDURE — 97116 GAIT TRAINING THERAPY: CPT | Mod: GP | Performed by: PHYSICAL THERAPIST

## 2022-11-02 PROCEDURE — 97110 THERAPEUTIC EXERCISES: CPT | Mod: GP | Performed by: PHYSICAL THERAPIST

## 2022-11-02 PROCEDURE — 120N000001 HC R&B MED SURG/OB

## 2022-11-02 PROCEDURE — 99232 SBSQ HOSP IP/OBS MODERATE 35: CPT | Performed by: HOSPITALIST

## 2022-11-02 PROCEDURE — 36415 COLL VENOUS BLD VENIPUNCTURE: CPT | Performed by: PHYSICIAN ASSISTANT

## 2022-11-02 PROCEDURE — 97530 THERAPEUTIC ACTIVITIES: CPT | Mod: GP | Performed by: PHYSICAL THERAPIST

## 2022-11-02 RX ORDER — MULTIPLE VITAMINS W/ MINERALS TAB 9MG-400MCG
1 TAB ORAL DAILY
Status: DISCONTINUED | OUTPATIENT
Start: 2022-11-02 | End: 2022-11-04 | Stop reason: HOSPADM

## 2022-11-02 RX ORDER — VIT C/E/ZN/COPPR/LUTEIN/ZEAXAN 60 MG-6 MG
1 CAPSULE ORAL DAILY
Status: DISCONTINUED | OUTPATIENT
Start: 2022-11-02 | End: 2022-11-04 | Stop reason: HOSPADM

## 2022-11-02 RX ORDER — AMOXICILLIN 250 MG
1 CAPSULE ORAL 2 TIMES DAILY PRN
Qty: 20 TABLET | Refills: 0 | Status: SHIPPED | OUTPATIENT
Start: 2022-11-02 | End: 2023-05-11

## 2022-11-02 RX ORDER — OXYCODONE HYDROCHLORIDE 5 MG/1
5-10 TABLET ORAL EVERY 4 HOURS PRN
Qty: 25 TABLET | Refills: 0 | Status: SHIPPED | OUTPATIENT
Start: 2022-11-02 | End: 2022-11-03

## 2022-11-02 RX ORDER — ONDANSETRON 4 MG/1
4 TABLET, ORALLY DISINTEGRATING ORAL EVERY 6 HOURS PRN
Qty: 8 TABLET | Refills: 0 | Status: SHIPPED | OUTPATIENT
Start: 2022-11-02 | End: 2023-05-11

## 2022-11-02 RX ORDER — ASPIRIN 325 MG
325 TABLET, DELAYED RELEASE (ENTERIC COATED) ORAL DAILY
Qty: 30 TABLET | Refills: 0 | Status: SHIPPED | OUTPATIENT
Start: 2022-11-03 | End: 2023-05-11

## 2022-11-02 RX ORDER — ACETAMINOPHEN 325 MG/1
650 TABLET ORAL EVERY 4 HOURS PRN
Qty: 30 TABLET | Refills: 0 | Status: SHIPPED | OUTPATIENT
Start: 2022-11-04 | End: 2023-05-11

## 2022-11-02 RX ADMIN — SENNOSIDES AND DOCUSATE SODIUM 1 TABLET: 50; 8.6 TABLET ORAL at 09:29

## 2022-11-02 RX ADMIN — ASPIRIN 325 MG: 325 TABLET, COATED ORAL at 09:28

## 2022-11-02 RX ADMIN — OXYCODONE HYDROCHLORIDE 2.5 MG: 5 TABLET ORAL at 15:44

## 2022-11-02 RX ADMIN — SENNOSIDES AND DOCUSATE SODIUM 1 TABLET: 50; 8.6 TABLET ORAL at 20:57

## 2022-11-02 RX ADMIN — CEFAZOLIN 1 G: 1 INJECTION, POWDER, FOR SOLUTION INTRAMUSCULAR; INTRAVENOUS at 04:01

## 2022-11-02 RX ADMIN — MULTIPLE VITAMINS W/ MINERALS TAB 1 TABLET: TAB at 14:24

## 2022-11-02 RX ADMIN — ACETAMINOPHEN 975 MG: 325 TABLET ORAL at 18:14

## 2022-11-02 RX ADMIN — ACETAMINOPHEN 975 MG: 325 TABLET ORAL at 01:34

## 2022-11-02 RX ADMIN — ACETAMINOPHEN 975 MG: 325 TABLET ORAL at 09:28

## 2022-11-02 RX ADMIN — Medication 1 CAPSULE: at 14:24

## 2022-11-02 ASSESSMENT — ACTIVITIES OF DAILY LIVING (ADL)
ADLS_ACUITY_SCORE: 25
ADLS_ACUITY_SCORE: 25
ADLS_ACUITY_SCORE: 24
ADLS_ACUITY_SCORE: 24
ADLS_ACUITY_SCORE: 25
ADLS_ACUITY_SCORE: 24
ADLS_ACUITY_SCORE: 25
ADLS_ACUITY_SCORE: 25
ADLS_ACUITY_SCORE: 24
ADLS_ACUITY_SCORE: 24
ADLS_ACUITY_SCORE: 25
ADLS_ACUITY_SCORE: 25

## 2022-11-02 NOTE — PLAN OF CARE
"Patient vital signs are at baseline: Yes  Patient able to ambulate as they were prior to admission or with assist devices provided by therapies during their stay:  No,  Reason:  Patient unable to tolerate ambulating, right leg intermittently \"gives out\"/arcelia when patient attempts to bear weight  Patient MUST void prior to discharge:  Yes, PVR 317cc after voiding 600cc  Patient able to tolerate oral intake:  Yes  Pain has adequate pain control using Oral analgesics:  Yes  Does patient have an identified :  No  Has goal D/C date and time been discussed with patient:  No    Patient alert/oriented, forgetful at times. Right hip dressing CDI. CMS intact. Pain managed with scheduled Tylenol and ice packs. Patient up to commode with A1 pivot. PIV SL.     "

## 2022-11-02 NOTE — PROGRESS NOTES
POD 0 of R hip closed reduction, percutaneous screw fixation. VSS. Arrived on the floor on 3L NC, now on RA @ 90-94%. LR infusing @ 100ml/hr. Capno on. Pain managed with scheduled Tylenol, ice pack and prn oxy 5mg x1. Up with 2/wlk/Gb to bedside commode. CMS intact.

## 2022-11-02 NOTE — PROGRESS NOTES
[facility]    Medicine Progress Note - Hospitalist Service    Date of Admission:  10/31/2022    Assessment & Plan        Carli Michelle is a 72 year old female admitted on 10/31/2022. PMHx HTN who presented for eval after mechanical fall found to have acute right femoral neck fracture.     Acute minimally displaced, valgus impacted right femoral neck fracture s/p closed reduction and percutaneous screw fixation (11/1/2022).  -On exam she has dressing in place over the operative site and that she is on aspirin for DVT prophylaxis per Ortho  -Weightbearing as tolerated over the right lower extremity with walker  -Continue the patient with physical therapy, Occupational Therapy and her pain seems to be currently well controlled with Tylenol and dressing changes per orthopedics team     Hypernatremia-resolved, hyperchloremia-improving  -Mild and likely related to NS initiated in ED.  -Patient was switched to Ringer lactate yesterday and her sodium today is normal at 142 and chloride is trending down 212 and she is eating and drinking and does not need any further IV fluids     Hypertension.  -Reports this as borderline hypertension, which is being monitored as an outpatient, not on medications currently.  -- Monitor.     Fever-resolved  Low grade measuring 100.4 without  other evident signs or symptoms of acute infection initially on presentation to the hospital but has not had any further episode of fever  - COVID PCR is negative. UA and CXR unrevealing.  --  She has not had any further episodes of fever       Diet: Advance Diet as Tolerated: Regular Diet Adult    DVT Prophylaxis: Aspirin per orthopedic  Eldridge Catheter: Not present  Central Lines: None  Cardiac Monitoring: None  Code Status: Full Code      Disposition Plan     Expected Discharge Date: 11/03/2022      Destination: home          The patient's care was discussed with the Bedside Nurse and Patient.  She mentioned that she  "will update her significant other    Nico Doe MD  Hospitalist Service  [facility]  Securely message with the [employer] Console (learn more here)  Text page via [employer] Paging/Directory         Clinically Significant Risk Factors                       # Overweight: Estimated body mass index is 25.56 kg/m  as calculated from the following:    Height as of this encounter: 1.499 m (4' 11\").    Weight as of this encounter: 57.4 kg (126 lb 8.7 oz)., PRESENT ON ADMISSION         ______________________________________________________________________    Interval History     I saw the patient today and she mentioned to me that she lives in [facility] with her significant other.  She also mentioned that she has been having some issues with kidney for a long time and other day she fell.  Mentioned that her local pain is well controlled but she has not worked with physical therapy yet.  She denies any fever, chills, nausea vomiting    I also discussed the plan of care with patient's nurse    Data reviewed today: I reviewed all medications, new labs and imaging results over the last 24 hours. I personally reviewed no images or EKG's today.    Physical Exam   Vital Signs: Temp: 98.1  F (36.7  C) Temp src: Oral BP: 136/67 Pulse: 65   Resp: 16 SpO2: 99 % O2 Device: None (Room air) Oxygen Delivery: 1 LPM  Weight: 126 lbs 8.7 oz        General: Patient appears comfortable and in no acute distress.  HEENT: Head is atraumatic, normocephalic.  Pupils are equal, round and reactive to light.  No scleral icterus. Oral mucosa is moist   Neck: Neck is supple and No Lymphadenopathy   Respiratory: Lungs are clear to auscultation bilaterally with no wheeze or crackles   Cardiovascular: Regular rate , S1 and S2 normal with no murmer or rubs or gallops  Abdomen:   soft , non tender , non distended and bowel sound present   Skin: No skin rashes or lesions to inspection or palpation.  Neurologic: Higher functions " are within normal limits. No obvious defects in speech, language and memory. No facial droop  Musculoskeletal: Normal Range of motion over upper and lower extremities bilaterally except the right leg which is restricted and has dressing in place over the operative site  Psychiatric: cooperative     Data   Recent Labs   Lab 11/02/22  0725 11/02/22  0616 11/01/22  0658 10/31/22  2355   WBC  --   --  8.6 15.1*   HGB 12.3  --  13.3 13.9   MCV  --   --  93 91   PLT  --   --  214 219     --  145* 142   POTASSIUM 4.2  --  3.9 3.7   CHLORIDE 112*  --  113* 109   CO2 26  --  26 26   BUN 17  --  18 20   CR 1.08*  --  0.96 0.94   ANIONGAP 4  --  6 7   BILL 8.4*  --  8.1* 9.1   GLC 94 105* 99 106*     No results found for this or any previous visit (from the past 24 hour(s)).  Medications     lactated ringers Stopped (11/02/22 0243)       acetaminophen  975 mg Oral Q8H     aspirin  325 mg Oral Daily     polyethylene glycol  17 g Oral Daily     senna-docusate  1 tablet Oral BID     sodium chloride (PF)  3 mL Intracatheter Q8H     sodium chloride (PF)  3 mL Intracatheter Q8H

## 2022-11-02 NOTE — PROGRESS NOTES
Orthopedic Surgery  Carli Michelle  11/02/2022     Admit Date:  10/31/2022  POD: 1 Day Post-Op   Procedure(s):  CLOSED REDUCTION, RIGHT HIP WITH PERCUTANEOUS PINNING.    Alert and oriented.   Patient resting comfortably in chair.    Pain controlled.  Tolerating oral intake.    Denies nausea or vomiting  Denies chest pain or shortness of breath.    Temp:  [97.7  F (36.5  C)-98.4  F (36.9  C)] 98.1  F (36.7  C)  Pulse:  [51-99] 65  Resp:  [8-20] 16  BP: (107-150)/(54-94) 136/67  SpO2:  [95 %-100 %] 99 %    Dressing is clean, dry, and intact. (gauze/tape)    Minimal erythema of the surrounding skin.   Bilateral calves are soft, non-tender.  Right lower extremity is NVI.  Sensation intact bilateral lower extremities  Patient able to resist dorsi and plantar flexion bilaterally  +Dp pulse    Labs:  Recent Labs   Lab Test 11/02/22  0725 11/01/22  0658 10/31/22  2355   WBC  --  8.6 15.1*   HGB 12.3 13.3 13.9   PLT  --  214 219       1. PLAN:   Continue ASA for DVT prophylaxis.     Mobilize with PT/OT    WBAT Right LE with walker.     Continue current pain regiment.   Dressings: Keep intact.  Change to aquacel tomorrow, prior to discharge.     2. Disposition   Anticipate d/c to home with home care 1-2 days when progressing in PT/OT.    Skyla Valdez PA-C

## 2022-11-02 NOTE — PROGRESS NOTES
11/02/22 1133   Appointment Info   Signing Clinician's Name / Credentials (PT) Yareli Warner DPT   Living Environment   People in Home significant other   Current Living Arrangements house   Home Accessibility stairs to enter home;stairs within home   Number of Stairs, Main Entrance 5   Stair Railings, Main Entrance railing on left side (ascending)   Number of Stairs, Within Home, Primary none   Stair Railings, Within Home, Primary none   Transportation Anticipated car, drives self;family or friend will provide   Living Environment Comments Pt's significant other able to provide assist at discharge.   Self-Care   Usual Activity Tolerance good   Current Activity Tolerance moderate   Regular Exercise Yes   Activity/Exercise Type strength training   Equipment Currently Used at Home none   Fall history within last six months yes   Number of times patient has fallen within last six months 1   General Information   Onset of Illness/Injury or Date of Surgery 10/31/22   Referring Physician Nico Doe MD   Patient/Family Therapy Goals Statement (PT) Pt is hopeful to discharge home.   Pertinent History of Current Problem (include personal factors and/or comorbidities that impact the POC) 73 y/o female admitted after a fall, noted to have R minimally displaced valgus impacted femoral neck fracture, now POD # 1 R hip closed reduction and percutaneous screw fixation.   Existing Precautions/Restrictions fall   Weight-Bearing Status - RLE weight-bearing as tolerated  (No hip precautions)   General Observations Pt in supine upon arrival of therapist.   Cognition   Affect/Mental Status (Cognition) WFL   Orientation Status (Cognition) oriented x 3   Follows Commands (Cognition) WFL   Pain Assessment   Patient Currently in Pain   (R LE pain at rest: 0/10)   Integumentary/Edema   Integumentary/Edema Comments R LE covered with dressing.   Posture    Posture Comments Noted forward head and shoulder posture standing at  FWW.   Range of Motion (ROM)   ROM Comment Limited R hip ROM d/t pain otherwise B LEs WFL.   Strength (Manual Muscle Testing)   Strength Comments Not formally assessed, pt demonstrates at least 3/5 grossly with functional mobility.   Bed Mobility   Comment, (Bed Mobility) Supine-sit and SBA.   Transfers   Comment, (Transfers) Sit <> stand with FWW and CGA.   Gait/Stairs (Locomotion)   Distance in Feet (Gait) 20'   Comment, (Gait/Stairs) Pt amb 5' with FWW and CGA.   Balance   Balance Comments Noted good seated and standing balance at FWW.   Sensory Examination   Sensory Perception Comments Pt denies numbness/tingling in B LEs.   Clinical Impression   Criteria for Skilled Therapeutic Intervention Yes, treatment indicated   PT Diagnosis (PT) Difficulty with functional mobility.   Influenced by the following impairments Pain, Impaired R hip ROM, Decreased strength, Decreased activity tolerance   Functional limitations due to impairments Limited functional mobility requiring AD and assist.   Clinical Presentation (PT Evaluation Complexity) Stable/Uncomplicated   Clinical Presentation Rationale Based on PMH, current presentation, and social support.   Clinical Decision Making (Complexity) low complexity   Planned Therapy Interventions (PT) bed mobility training;cryotherapy;gait training;stair training;ROM (range of motion);strengthening;transfer training   Anticipated Equipment Needs at Discharge (PT) walker, rolling   Risk & Benefits of therapy have been explained patient   PT Total Evaluation Time   PT [place] Complexity Minutes (32991) 10   Plan of Care Review   Plan of Care Reviewed With patient   Physical Therapy Goals   PT Frequency 2x/day   PT Predicted Duration/Target Date for Goal Attainment 11/05/22   PT Goals Bed Mobility;Gait;Transfers;Stairs   PT: Bed Mobility Supervision/stand-by assist;Supine to/from sit   PT: Transfers Supervision/stand-by assist;Sit to/from stand;Assistive device   PT: Gait  Supervision/stand-by assist;Rolling walker;100 feet   PT: Stairs 5 stairs;Minimal assist;Assistive device   Therapeutic Procedure/Exercise   Ther. Procedure: strength, endurance, ROM, flexibillity Minutes (54488) 8   Symptoms Noted During/After Treatment fatigue;increased pain   Treatment Detail/Skilled Intervention PT: Pt performed supine strengthening/ROM exercises x 10 repetitions with cues for technique: ankle pumps, glut set, quad set, heel slides, and SAQ.   Therapeutic Activity   Therapeutic Activities: dynamic activities to improve functional performance Minutes (53188) 12   Symptoms Noted During/After Treatment Fatigue;Increased pain   Treatment Detail/Skilled Intervention PT: Educated pt on role and POC of PT. Educated pt on WBAT and no hip precautions. Pt performed sit <> stand with FWW and CGA, cues provided for appropriate hand placement and upright posture. Pt sitting up in chair at end of session, chair alarm on and needs within reach.   Gait Training   Gait Training Minutes (33899) 8   Symptoms Noted During/After Treatment (Gait Training) fatigue;increased pain   Treatment Detail/Skilled Intervention PT: Pt amb an additional 20' with FWW and CGA, frequent cues provided for sequencing and upright posture.   PT Discharge Planning   PT Plan Assess stairs, progress gait as able, LE ex/ROM   PT Discharge Recommendation (DC Rec) home with assist;home with home care physical therapy   PT Rationale for DC Rec Anticipate pt will continue to progress towards independence in order to safely discharge home with assist of significant other pending assessment of stairs. If pt is unable to safely navigate stairs, TCU will need to be considered. Pt is motivated to return home and reports she has support.   PT Brief overview of current status SBA for bed mobility, CGA for transfers and ambulation with FWW   Total Session Time   Timed Code Treatment Minutes 28   Total Session Time (sum of timed and untimed services)  38

## 2022-11-02 NOTE — PLAN OF CARE
Goal Outcome Evaluation:         A/O x4. VSS on RA. Up with 1. Regular diet. POD 1 R hip closed reduction and percutaneous screw fixation, dressing CDI. Pain 5/10, Oxy given once. Discharge pending.

## 2022-11-03 ENCOUNTER — APPOINTMENT (OUTPATIENT)
Dept: PHYSICAL THERAPY | Facility: CLINIC | Age: 72
DRG: 481 | End: 2022-11-03
Payer: MEDICARE

## 2022-11-03 ENCOUNTER — APPOINTMENT (OUTPATIENT)
Dept: OCCUPATIONAL THERAPY | Facility: CLINIC | Age: 72
DRG: 481 | End: 2022-11-03
Attending: PHYSICIAN ASSISTANT
Payer: MEDICARE

## 2022-11-03 LAB
ANION GAP SERPL CALCULATED.3IONS-SCNC: 5 MMOL/L (ref 3–14)
BUN SERPL-MCNC: 17 MG/DL (ref 7–30)
CALCIUM SERPL-MCNC: 8.3 MG/DL (ref 8.5–10.1)
CHLORIDE BLD-SCNC: 112 MMOL/L (ref 94–109)
CO2 SERPL-SCNC: 26 MMOL/L (ref 20–32)
CREAT SERPL-MCNC: 1.04 MG/DL (ref 0.52–1.04)
GFR SERPL CREATININE-BSD FRML MDRD: 56 ML/MIN/1.73M2
GLUCOSE BLD-MCNC: 87 MG/DL (ref 70–99)
HGB BLD-MCNC: 12.8 G/DL (ref 11.7–15.7)
POTASSIUM BLD-SCNC: 3.8 MMOL/L (ref 3.4–5.3)
SODIUM SERPL-SCNC: 143 MMOL/L (ref 133–144)

## 2022-11-03 PROCEDURE — 80048 BASIC METABOLIC PNL TOTAL CA: CPT | Performed by: HOSPITALIST

## 2022-11-03 PROCEDURE — 120N000001 HC R&B MED SURG/OB

## 2022-11-03 PROCEDURE — 250N000013 HC RX MED GY IP 250 OP 250 PS 637: Performed by: PHYSICIAN ASSISTANT

## 2022-11-03 PROCEDURE — 36415 COLL VENOUS BLD VENIPUNCTURE: CPT | Performed by: PHYSICIAN ASSISTANT

## 2022-11-03 PROCEDURE — 97530 THERAPEUTIC ACTIVITIES: CPT | Mod: GP | Performed by: PHYSICAL THERAPY ASSISTANT

## 2022-11-03 PROCEDURE — 97165 OT EVAL LOW COMPLEX 30 MIN: CPT | Mod: GO | Performed by: OCCUPATIONAL THERAPIST

## 2022-11-03 PROCEDURE — 99232 SBSQ HOSP IP/OBS MODERATE 35: CPT | Performed by: HOSPITALIST

## 2022-11-03 PROCEDURE — 97535 SELF CARE MNGMENT TRAINING: CPT | Mod: GO | Performed by: OCCUPATIONAL THERAPIST

## 2022-11-03 PROCEDURE — 97110 THERAPEUTIC EXERCISES: CPT | Mod: GP | Performed by: PHYSICAL THERAPY ASSISTANT

## 2022-11-03 PROCEDURE — 250N000013 HC RX MED GY IP 250 OP 250 PS 637: Performed by: HOSPITALIST

## 2022-11-03 PROCEDURE — 97116 GAIT TRAINING THERAPY: CPT | Mod: GP | Performed by: PHYSICAL THERAPY ASSISTANT

## 2022-11-03 PROCEDURE — 85018 HEMOGLOBIN: CPT | Performed by: PHYSICIAN ASSISTANT

## 2022-11-03 RX ORDER — OXYCODONE HYDROCHLORIDE 5 MG/1
2.5 TABLET ORAL EVERY 6 HOURS PRN
Qty: 16 TABLET | Refills: 0 | Status: SHIPPED | OUTPATIENT
Start: 2022-11-03 | End: 2023-05-11

## 2022-11-03 RX ADMIN — SENNOSIDES AND DOCUSATE SODIUM 1 TABLET: 50; 8.6 TABLET ORAL at 09:45

## 2022-11-03 RX ADMIN — MULTIPLE VITAMINS W/ MINERALS TAB 1 TABLET: TAB at 09:45

## 2022-11-03 RX ADMIN — ACETAMINOPHEN 975 MG: 325 TABLET ORAL at 17:39

## 2022-11-03 RX ADMIN — ASPIRIN 325 MG: 325 TABLET, COATED ORAL at 09:45

## 2022-11-03 RX ADMIN — Medication 1 CAPSULE: at 09:45

## 2022-11-03 RX ADMIN — ACETAMINOPHEN 975 MG: 325 TABLET ORAL at 01:43

## 2022-11-03 RX ADMIN — ACETAMINOPHEN 975 MG: 325 TABLET ORAL at 09:45

## 2022-11-03 ASSESSMENT — ACTIVITIES OF DAILY LIVING (ADL)
ADLS_ACUITY_SCORE: 25
PREVIOUS_RESPONSIBILITIES: MEAL PREP;HOUSEKEEPING;LAUNDRY;SHOPPING;YARDWORK;MEDICATION MANAGEMENT;FINANCES;DRIVING
ADLS_ACUITY_SCORE: 25
ADLS_ACUITY_SCORE: 27
ADLS_ACUITY_SCORE: 25
DEPENDENT_IADLS:: INDEPENDENT

## 2022-11-03 NOTE — PROGRESS NOTES
11/03/22 0949   Appointment Info   Signing Clinician's Name / Credentials (OT) Lencho Soria OTR/L   Living Environment   People in Home significant other   Current Living Arrangements house   Home Accessibility stairs to enter home;stairs within home   Number of Stairs, Main Entrance 5  (2 step entry into home, 5 step from sidewalk to house)   Stair Railings, Main Entrance railing on left side (ascending)   Number of Stairs, Within Home, Primary none   Stair Railings, Within Home, Primary none   Transportation Anticipated car, drives self;family or friend will provide   Living Environment Comments Pt lives in one level home with sig other. Tub shower.   Self-Care   Usual Activity Tolerance good   Current Activity Tolerance moderate   Regular Exercise Yes   Activity/Exercise Type strength training   Equipment Currently Used at Home none   Fall history within last six months yes   Number of times patient has fallen within last six months 1   Activity/Exercise/Self-Care Comment IND with self care ADL at baseline   Instrumental Activities of Daily Living (IADL)   Previous Responsibilities meal prep;housekeeping;laundry;shopping;yardwork;medication management;finances;driving   IADL Comments Pt.was IND with all IADL at baseline. Sig other can assist upoon return home.   General Information   Onset of Illness/Injury or Date of Surgery 10/31/22   Referring Physician Uzair Martin PA-C   Patient/Family Therapy Goal Statement (OT) return home   Additional Occupational Profile Info/Pertinent History of Current Problem Carli Michelle is a 72 year old female admitted on 10/31/2022. PMHx HTN who presented for eval after mechanical fall found to have acute right femoral neck fracture.R hip closed reduction & screw fixation   Existing Precautions/Restrictions weight bearing   Right Lower Extremity (Weight-bearing Status) weight-bearing as tolerated (WBAT)   Cognitive Status Examination   Orientation Status  orientation to person, place and time   Pain Assessment   Patient Currently in Pain Yes, see Vital Sign flowsheet  (4/10 with movement R LE)   Range of Motion Comprehensive   General Range of Motion bilateral upper extremity ROM WFL   Comment, General Range of Motion B UE WNL. Decreased ROM R LE   Strength Comprehensive (MMT)   Comment, General Manual Muscle Testing (MMT) Assessment B UE WFl Deecreased R LE strength for ADL   Bed Mobility   Bed Mobility supine-sit   Supine-Sit [place] (Bed Mobility) contact guard;verbal cues   Transfers   Transfers sit-stand transfer;toilet transfer;shower transfer   Sit-Stand Transfer   Sit-Stand [place] (Transfers) supervision;verbal cues;nonverbal cues (demo/gesture)   Shower Transfer   Type (Shower Transfer) squat pivot;stand-sit;sit-stand   [place] Level (Shower Transfer) moderate assist (50% patient effort)   Assistive Device (Shower Transfer) tub transfer bench;walker, front-wheeled   Toilet Transfer   Type (Toilet Transfer) sit-stand;stand-sit   [place] Level (Toilet Transfer) minimum assist (75% patient effort);nonverbal cues (demo/gesture);verbal cues   Clinical Impression   Criteria for Skilled Therapeutic Interventions Met (OT) Yes, treatment indicated   OT Diagnosis Weakness and decreased functional mobility   OT Problem List-Impairments impacting ADL problems related to;activity tolerance impaired;flexibility;pain;range of motion (ROM);mobility   Assessment of Occupational Performance 1-3 Performance Deficits   Identified Performance Deficits dressing bathing IADL   Planned Therapy Interventions (OT) ADL retraining;home program guidelines;risk factor education   Clinical Decision Making Complexity (OT) low complexity   Anticipated Equipment Needs Upon Discharge (OT) tub bench;walker, rolling   Risk & Benefits of therapy have been explained evaluation/treatment results reviewed;care plan/treatment goals reviewed;risks/benefits  reviewed;current/potential barriers reviewed;participants voiced agreement with care plan;participants included;patient   OT Total Evaluation Time   OT Eval, Low Complexity Minutes (80036) 10   OT Goals   Therapy Frequency (OT) One time eval and treatment   OT Predicted Duration/Target Date for Goal Attainment 11/03/22   OT Goals Lower Body Dressing;Transfers;Toilet Transfer/Toileting;Cognition   OT: Lower Body Dressing Modified independent;within precautions;Goal Met   OT: Transfer Supervision/stand-by assist;with assistive device;within precautions;Goal Met  (Tub transfer to tub bench)   OT: Toilet Transfer/Toileting Modified independent;within precautions;Goal Met   OT: Cognitive Patient/caregiver will verbalize understanding of cognitive assessment results/recommendations as needed for safe discharge planning  (Goal Met)   Self-Care/Home Management   Self-Care/Home Mgmt/ADL, Compensatory, Meal Prep Minutes (00517) 26   Symptoms Noted During/After Treatment (Meal Preparation/Planning Training) fatigue   Treatment Detail/Skilled Intervention Eval completed. Sig other on phone during session. Pt. instructed in LE dressing techniques with cues for donning garments to LEs socks and then shoes prior to sit to stand. Pt completed with SUP A. Instructed pt in safe WW use to bathroom with body placemeent for opeing doors stabloizing on solid surface. SUP A. Simulated toilet set up with Demo of technique, Sup A stand <>sit. Pt given equipment resource list with recommendtion for tub bench with back. Completed simulation of sit and turn tub Transfer. Demo of correct technique. Pt completed with SBA. Pt./sig other with DME quetions which were answered. Pt. will acquire tub bench with back community purchase.   OT Discharge Planning   OT Rationale for DC Rec Demonstrtes ability to return home with sig other A for ADL   OT Brief overview of current status SBa with ADL mobility in bathroom.   Total Session Time   Timed Code  Treatment Minutes 26   Total Session Time (sum of timed and untimed services) 36

## 2022-11-03 NOTE — PLAN OF CARE
Goal Outcome Evaluation:         A/O x4. VSS on RA. Up with SBA, 1 in halls. Regular diet. CMS intact. Dressing changed to aquacel today, CDI. Discharge tomorrow.

## 2022-11-03 NOTE — CONSULTS
Care Management Initial Consult    General Information  Assessment completed with: Patient, Carli  Type of CM/SW Visit: Initial Assessment    Primary Care Provider verified and updated as needed: Yes   Readmission within the last 30 days:        Reason for Consult: discharge planning  Advance Care Planning: Advance Care Planning Reviewed: no concerns identified          Communication Assessment  Patient's communication style: spoken language (English or Bilingual)    Hearing Difficulty or Deaf: no   Wear Glasses or Blind: yes    Cognitive  Cognitive/Neuro/Behavioral: WDL  Level of Consciousness: alert  Arousal Level: opens eyes spontaneously  Orientation: oriented x 4  Mood/Behavior: calm, cooperative  Best Language: 0 - No aphasia  Speech: clear, spontaneous, logical    Living Environment:   People in home: significant other  Jatinder  Current living Arrangements: house      Able to return to prior arrangements:         Family/Social Support:  Care provided by: self  Provides care for: no one  Marital Status: Lives with Significant Other  Significant Other       Jatinder  Description of Support System: Supportive, Involved         Current Resources:   Patient receiving home care services: No     Community Resources: None  Equipment currently used at home: none  Supplies currently used at home:      Employment/Financial:  Employment Status: retired        Financial Concerns: No concerns identified           Lifestyle & Psychosocial Needs:  Social Determinants of Health     Tobacco Use: Low Risk      Smoking Tobacco Use: Never     Smokeless Tobacco Use: Never     Passive Exposure: Not on file   Alcohol Use: Not on file   Financial Resource Strain: Not on file   Food Insecurity: Not on file   Transportation Needs: Not on file   Physical Activity: Not on file   Stress: Not on file   Social Connections: Not on file   Intimate Partner Violence: Not on file   Depression: Not at risk     PHQ-2 Score: 0   Housing Stability: Not on  file       Functional Status:  Prior to admission patient needed assistance:   Dependent ADLs:: Independent  Dependent IADLs:: Independent       Mental Health Status:  Mental Health Status: No Current Concerns       Chemical Dependency Status:  Chemical Dependency Status: No Current Concerns             Values/Beliefs:  Spiritual, Cultural Beliefs, Rastafari Practices, Values that affect care: no               Additional Information:  Care management consult for possible need for home care at discharge.  PT recommending home with home PT per yesterdays note.  Met with pt to discuss.  Pt shared she would be open to having home care if therapy recommends it, stated she will be working with PT again this afternoon and again in the morning and would like to know if they still think it is needed after those visits.    [facility] has accepted pt for home care RN/PT at discharge if still recommended.  Per MD, probable discharge tomorrow.  CM to follow.    Randee Valentin RN, BS  Care Coordinator  [email or web address]

## 2022-11-03 NOTE — PROGRESS NOTES
[facility]    Medicine Progress Note - Hospitalist Service    Date of Admission:  10/31/2022    Assessment & Plan        Carli Michelle is a 72 year old female admitted on 10/31/2022. PMHx HTN who presented for eval after mechanical fall found to have acute right femoral neck fracture.     Acute minimally displaced, valgus impacted right femoral neck fracture s/p closed reduction and percutaneous screw fixation (11/1/2022).  -On exam she has dressing in place over the operative site and that she is on aspirin for DVT prophylaxis per Ortho  -Weightbearing as tolerated over the right lower extremity with walker  -Continue the patient with physical therapy, Occupational Therapy and her pain seems to be currently well controlled with Tylenol and prn oxycodonel and dressing changes per orthopedics team  -Hemoglobin is stable this morning at 12.8  - I saw her going up and down the steps with therapy and she did not do well and we will give her one more day and I am concerned about her safety and spoke with laquita from OT who agrees and appreciate input     Hypernatremia-resolved, hyperchloremia-improving  -Mild and likely related to NS initiated in ED.     Hypertension.  -Reports this as borderline hypertension, which is being monitored as an outpatient, not on medications currently.  -- Monitor.     Fever-resolved  Low grade measuring 100.4 without  other evident signs or symptoms of acute infection initially on presentation to the hospital but has not had any further episode of fever  - COVID PCR is negative. UA and CXR unrevealing.  --  She has not had any further episodes of fever       Diet: Advance Diet as Tolerated: Regular Diet Adult  Diet    DVT Prophylaxis: Aspirin per orthopedic  [place] Catheter: Not present  Central Lines: None  Cardiac Monitoring: None  Code Status: Full Code      Disposition Plan      Expected Discharge Date: 11/03/2022      Destination: home          The  "patient's care was discussed with the Bedside Nurse, Patient and Patient's Family. Met with her significant other also and he agrees with staying one more day    Nico Doe MD  Hospitalist Service  [facility]  Securely message with the Vocera Web Console (learn more here)  Text page via [employer] Paging/Directory         Clinically Significant Risk Factors                       # Overweight: Estimated body mass index is 25.02 kg/m  as calculated from the following:    Height as of this encounter: 1.499 m (4' 11\").    Weight as of this encounter: 56.2 kg (123 lb 14.4 oz)., PRESENT ON ADMISSION         ______________________________________________________________________    Interval History      I did see her today and she has no blood or chills or nausea or vomiting and local pain is well controlled with Tylenol and she did use a small dose of oxycodone.  Patient was seen by physical therapy and they felt that she may be able to go home today but later I saw her in occupational therapy and she had difficulty going up and down the steps and her significant other was present with the patient and there is concerned that she may fall and we will see how she does tomorrow.    Plan of care discussed with the patient and the nurse    Data reviewed today: I reviewed all medications, new labs and imaging results over the last 24 hours. I personally reviewed no images or EKG's today.    Physical Exam   Vital Signs: Temp: 98.9  F (37.2  C) Temp src: Oral BP: (!) 152/71 Pulse: 62   Resp: 16 SpO2: 97 % O2 Device: None (Room air)    Weight: 123 lbs 14.38 oz        General: Patient appears comfortable and in no acute distress and I saw her participating in occupational therapy  HEENT: Head is atraumatic, normocephalic.     Neck: Neck is supple   Respiratory: Lungs are clear to auscultation bilaterally with no wheeze or crackles   Cardiovascular: Regular rate , S1 and S2 normal with no murmer or rubs or " gallops  Abdomen:   soft , non tender , non distended and bowel sound present   Skin: No skin rashes  Neurologic:  No facial droop  Musculoskeletal: Normal Range of motion over upper and lower extremities bilaterally except the right leg which is restricted   Psychiatric: cooperative     Data   Recent Labs   Lab 11/03/22  0743 11/02/22  0725 11/02/22  0616 11/01/22  0658 10/31/22  2355   WBC  --   --   --  8.6 15.1*   HGB 12.8 12.3  --  13.3 13.9   MCV  --   --   --  93 91   PLT  --   --   --  214 219    142  --  145* 142   POTASSIUM 3.8 4.2  --  3.9 3.7   CHLORIDE 112* 112*  --  113* 109   CO2 26 26  --  26 26   BUN 17 17  --  18 20   CR 1.04 1.08*  --  0.96 0.94   ANIONGAP 5 4  --  6 7   BILL 8.3* 8.4*  --  8.1* 9.1   GLC 87 94 105* 99 106*     No results found for this or any previous visit (from the past 24 hour(s)).  Medications       acetaminophen  975 mg Oral Q8H     aspirin  325 mg Oral Daily     multivitamin  with lutein  1 capsule Oral Daily     multivitamin w/minerals  1 tablet Oral Daily     polyethylene glycol  17 g Oral Daily     senna-docusate  1 tablet Oral BID     sodium chloride (PF)  3 mL Intracatheter Q8H     sodium chloride (PF)  3 mL Intracatheter Q8H

## 2022-11-03 NOTE — PLAN OF CARE
Shift Note: 1900-0730  POD 2 R hip closed reduction & screw fixation. Pt A&Ox4, VSS on RA. Pain managed w/ scheduled tylenol. CMS intact. R hip dressing CDI, ice applied intermittently. Voiding, no BM. No N/V, BS active. PIV SL. Regular diet, tolerating. Up Ax1 GB/W. OT to see. Likely discharge to home today.

## 2022-11-03 NOTE — PROGRESS NOTES
Orthopedic Surgery  Carli Michelle  11/3/2022  Admit Date:  10/31/2022  POD 2 Days Post-Op  S/P Procedure(s):  CLOSED REDUCTION, RIGHT HIP WITH PERCUTANEOUS PINNING.    Patient resting comfortably in chair, dressed in her own clothing.    Pain controlled.  Tolerating oral intake.    Denies nausea or vomiting  Denies chest pain or shortness of breath  No events overnight.     Alert and orient to person, place, and time.  Vital Sign Ranges  Temperature Temp  Av.3  F (36.8  C)  Min: 97.6  F (36.4  C)  Max: 98.9  F (37.2  C)   Blood pressure Systolic (24hrs), Av , Min:131 , Max:168        Diastolic (24hrs), Av, Min:59, Max:71      Pulse Pulse  Av  Min: 61  Max: 63   Respirations Resp  Av  Min: 16  Max: 16   Pulse oximetry SpO2  Av.7 %  Min: 96 %  Max: 97 %       Bilateral calves are soft, non-tender.  Bilateral lower extremity is NVI.  Sensation intact bilateral lower extremities  5/5 motor with resisted dorsi and plantar flexion bilaterally  +Dp pulse      Labs:  Recent Labs   Lab Test 22  0743 22  0725 22  0658   POTASSIUM 3.8 4.2 3.9     Recent Labs   Lab Test 22  0743 22  0725 22  0658   HGB 12.8 12.3 13.3     No results for input(s): INR in the last 15734 hours.  Recent Labs   Lab Test 22  0658 10/31/22  2355    219       A/P  1. S/p right femoral neck fracture CRPP   Continue ASA for DVT prophylaxis.     Mobilize with PT/OT    WBAT with walker   Leave dressing intact.     Continue current pain regiment.    2. Disposition   Anticipate d/c to home tomorrow.    Alba Ruth PA-C

## 2022-11-04 ENCOUNTER — APPOINTMENT (OUTPATIENT)
Dept: PHYSICAL THERAPY | Facility: CLINIC | Age: 72
DRG: 481 | End: 2022-11-04
Payer: MEDICARE

## 2022-11-04 VITALS
RESPIRATION RATE: 18 BRPM | DIASTOLIC BLOOD PRESSURE: 72 MMHG | HEIGHT: 59 IN | HEART RATE: 64 BPM | BODY MASS INDEX: 24.98 KG/M2 | OXYGEN SATURATION: 98 % | TEMPERATURE: 98.6 F | SYSTOLIC BLOOD PRESSURE: 152 MMHG | WEIGHT: 123.9 LBS

## 2022-11-04 LAB
DEPRECATED CALCIDIOL+CALCIFEROL SERPL-MC: <41 UG/L (ref 20–75)
VITAMIN D2 SERPL-MCNC: <5 UG/L
VITAMIN D3 SERPL-MCNC: 36 UG/L

## 2022-11-04 PROCEDURE — 250N000013 HC RX MED GY IP 250 OP 250 PS 637: Performed by: HOSPITALIST

## 2022-11-04 PROCEDURE — 99239 HOSP IP/OBS DSCHRG MGMT >30: CPT | Performed by: HOSPITALIST

## 2022-11-04 PROCEDURE — 97116 GAIT TRAINING THERAPY: CPT | Mod: GP | Performed by: PHYSICAL THERAPY ASSISTANT

## 2022-11-04 PROCEDURE — 250N000013 HC RX MED GY IP 250 OP 250 PS 637: Performed by: PHYSICIAN ASSISTANT

## 2022-11-04 PROCEDURE — 97530 THERAPEUTIC ACTIVITIES: CPT | Mod: GP | Performed by: PHYSICAL THERAPY ASSISTANT

## 2022-11-04 RX ADMIN — ASPIRIN 325 MG: 325 TABLET, COATED ORAL at 08:16

## 2022-11-04 RX ADMIN — Medication 1 CAPSULE: at 08:16

## 2022-11-04 RX ADMIN — MULTIPLE VITAMINS W/ MINERALS TAB 1 TABLET: TAB at 08:16

## 2022-11-04 RX ADMIN — ACETAMINOPHEN 975 MG: 325 TABLET ORAL at 01:07

## 2022-11-04 RX ADMIN — ACETAMINOPHEN 975 MG: 325 TABLET ORAL at 11:08

## 2022-11-04 ASSESSMENT — ACTIVITIES OF DAILY LIVING (ADL)
ADLS_ACUITY_SCORE: 27

## 2022-11-04 NOTE — PROGRESS NOTES
Met with pt and spouse to go over discharge instructions. Provided them with discharge medications. Spouse to provide ride home.

## 2022-11-04 NOTE — PROGRESS NOTES
Orthopedic Surgery  Carli Michelle  2022  Admit Date:  10/31/2022  POD 3 Days Post-Op  S/P Procedure(s):  CLOSED REDUCTION, RIGHT HIP WITH PERCUTANEOUS PINNING.    Patient resting comfortably in bed.    Pain controlled.  Tolerating oral intake.    Denies nausea or vomiting  Denies chest pain or shortness of breath  No events overnight.     Alert and orient to person, place, and time.  Vital Sign Ranges  Temperature Temp  Av.3  F (36.8  C)  Min: 98  F (36.7  C)  Max: 98.6  F (37  C)   Blood pressure Systolic (24hrs), Av , Min:150 , Max:165        Diastolic (24hrs), Av, Min:7, Max:72      Pulse Pulse  Av.3  Min: 61  Max: 65   Respirations Resp  Av.5  Min: 16  Max: 18   Pulse oximetry SpO2  Av %  Min: 94 %  Max: 99 %       Dressing is clean, dry, and intact.   Posterior thigh ecchymosis, no TTP  Minimal erythema of the surrounding skin.   Bilateral calves are soft, non-tender.  Bilateral lower extremity is NVI.  Sensation intact bilateral lower extremities  5/5 motor with resisted dorsi and plantar flexion bilaterally  +Dp pulse      Labs:  Recent Labs   Lab Test 22  0743 22  0725 22  0658   POTASSIUM 3.8 4.2 3.9     Recent Labs   Lab Test 22  0743 22  0725 22  0658   HGB 12.8 12.3 13.3     No results for input(s): INR in the last 42708 hours.  Recent Labs   Lab Test 22  0658 10/31/22  2355    219     A/P  1. S/p right femoral neck fracture CRPP              Continue ASA for DVT prophylaxis.                Mobilize with PT/OT               WBAT with walker              Leave dressing intact.                Continue current pain regiment.     2. Disposition              Anticipate d/c to home today    Alba Ruth PA-C

## 2022-11-04 NOTE — DISCHARGE SUMMARY
[facility]  Hospitalist Discharge Summary      Date of Admission:  10/31/2022  Date of Discharge:  11/4/2022  Discharging Provider: Nico Doe MD  Discharge Service: Hospitalist Service    Discharge Diagnoses      Acute minimally displaced, valgus impacted right femoral neck fracture s/p closed reduction and percutaneous screw fixation (11/1/2022)  Hypernatremia-resolved, hyperchloremia-improving    Follow-ups Needed After Discharge   Follow-up Appointments     Follow-up and recommended labs and tests      Follow-up with Dr Platt at [facility] 2 weeks following your surgery.   To arrange appointment or questions call: the care coordinator at   [phone]  [facility] [place] phone number: [phone]  [facility] [place] phone number: [phone]         Follow-up and recommended labs and tests       Follow up with primary care provider, Houston Menchaca, within 7 days for   hospital follow- up.  No follow up labs or test are needed.         {Additional follow-up instructions/to-do's for PCP        Unresulted Labs Ordered in the Past 30 Days of this Admission     No orders found from 10/1/2022 to 11/1/2022.      These results will be followed up by     Discharge Disposition   Discharged to home  Condition at discharge: Stable        Hospital Course     This is a 72-year-old female with medical history which includes hypertension and is not on any medication presented to the hospital after mechanical fall and she was found to have right femoral neck fracture and orthopedics was consulted and patient underwent closed reduction and percutaneous screw fixation on 11/1/2022 and postoperatively she was seen by physical therapy, Occupational Therapy and orthopedics.  She was also started on aspirin for DVT prophylaxis per orthopedics.  Of note she had a low-grade fever when she came to the hospital but for COVID-19 was negative, UA and chest x-ray were normal.  Patient has been seen by physical therapy  and Occupational Therapy and she is comfortable going home.  On day of discharge patient was in agreement with going home and will follow as outpatient with orthopedic  Home care orders signed    Consultations This Hospital Stay   ORTHOPEDIC SURGERY IP CONSULT  PHYSICAL THERAPY ADULT IP CONSULT  OCCUPATIONAL THERAPY ADULT IP CONSULT  CARE MANAGEMENT / SOCIAL WORK IP CONSULT    Code Status   Full Code    Time Spent on this Encounter   INico MD, personally saw the patient today and spent greater than 30 minutes discharging this patient.       Nico Doe MD  [facility] ORTHOPED[facility] SPINE  [address] [place] [place] [address]  Phone: [phone]  Fax: [phone]  ______________________________________________________________________    Physical Exam   Vital Signs: Temp: 98.6  F (37  C) Temp src: Oral BP: (!) 152/72 Pulse: 64   Resp: 18 SpO2: 98 % O2 Device: None (Room air)    Weight: 123 lbs 14.38 oz    General: Patient appears comfortable and in no acute distress and I saw her participating in occupational therapy  HEENT: Head is atraumatic, normocephalic.     Neck: Neck is supple   Respiratory: ctla  Cardiovascular: Regular rate , S1 and S2 normal with no murmer or rubs or gallops  Abdomen:   soft , non tender , non distended and bowel sound present   Skin: No skin rashes  Neurologic:  No facial droop  Musculoskeletal: Normal Range of motion over upper and lower extremities bilaterally except the right leg which is restricted   Psychiatric: cooperative        Primary Care Physician   Houston Menchaca    Discharge Orders      Home care nursing referral      Home Care Referral      Reason for your hospital stay    Right hip fracture: CRPP     Follow-up and recommended labs and tests    Follow-up with Dr Platt at [facility] 2 weeks following your surgery.   To arrange appointment or questions call: the care coordinator at [phone]  [facility] phone number: [phone]  [facility]  BUDDY Matta phone number: [phone]     Activity    Your activity upon discharge: WBAT Right LE with walker.  Rest as needed.     When to contact your care team    Call your provider if you have any of the following: temperature greater than 101,  increased shortness of breath, increased drainage, redness or increasing calf pain/cramping.     Wound care and dressings    Instructions to care for your wound at home: Keep wound clean and dry.  Keep incision covered.  Able to shower over aquacel or tegaderm dressing.  Do not immerse.     Reason for your hospital stay    Hip fracture     Follow-up and recommended labs and tests     Follow up with primary care provider, Houston Menchaca, within 7 days for hospital follow- up.  No follow up labs or test are needed.     Crutches DME    DME Documentation: Describe the reason for need to support medical necessity: Impaired gait status post hip surgery. I, the undersigned, certify that the above prescribed supplies are medically necessary for this patient and is both reasonable and necessary in reference to accepted standards of medical practice in the treatment of this patient's condition and is not prescribed as a convenience.     Cane DME    DME Documentation: Describe the reason for need to support medical necessity: Impaired gait status post hip surgery. I, the undersigned, certify that the above prescribed supplies are medically necessary for this patient and is both reasonable and necessary in reference to accepted standards of medical practice in the treatment of this patient's condition and is not prescribed as a convenience.     Walker DME    : DME Documentation: Describe the reason for need to support medical necessity: Impaired gait status post hip surgery. I, the undersigned, certify that the above prescribed supplies are medically necessary for this patient and is both reasonable and necessary in reference to accepted standards of medical practice in the treatment  of this patient's condition and is not prescribed as a convenience.     Diet    Follow this diet upon discharge: Orders Placed This Encounter      Advance Diet as Tolerated: Regular Diet Adult       Significant Results and Procedures   Most Recent 3 CBC's:Recent Labs   Lab Test 11/03/22  0743 11/02/22  0725 11/01/22  0658 10/31/22  2355   WBC  --   --  8.6 15.1*   HGB 12.8 12.3 13.3 13.9   MCV  --   --  93 91   PLT  --   --  214 219     Most Recent 3 BMP's:Recent Labs   Lab Test 11/03/22  0743 11/02/22  0725 11/02/22  0616 11/01/22  0658    142  --  145*   POTASSIUM 3.8 4.2  --  3.9   CHLORIDE 112* 112*  --  113*   CO2 26 26  --  26   BUN 17 17  --  18   CR 1.04 1.08*  --  0.96   ANIONGAP 5 4  --  6   BILL 8.3* 8.4*  --  8.1*   GLC 87 94 105* 99     Most Recent 2 LFT's:No lab results found.,   Results for orders placed or performed during the hospital encounter of 10/31/22   XR Pelvis w Hip Right 1 View    Narrative    EXAM: XR PELVIS AND HIP RIGHT 1 VIEW  LOCATION: [facility]  DATE/TIME: 10/31/2022 11:28 PM    INDICATION: pain after trauma  COMPARISON: None.      Impression    IMPRESSION: Mildly angulated right femoral neck fracture.   XR Chest 2 Views    Narrative    CHEST TWO VIEWS November 1, 2022 9:45 AM     HISTORY: Fever.    COMPARISON: None.       Impression    IMPRESSION: There are no acute infiltrates. The cardiac silhouette is  not enlarged. Pulmonary vasculature is unremarkable.    LILIANA HALL MD         SYSTEM ID:  [id number]   XR Surgery TIMOTHY Fluoro Less Than 5 Min w Stills    Narrative    XR SURGERY TIMOTHY FLUORO LESS THAN 5 MIN W STILLS 11/1/2022 1:35 PM     HISTORY: right hip fracture    NUMBER OF IMAGES ACQUIRED: 12    VIEWS: 4    FLUOROSCOPY TIME: 0.9 minutes.      Impression    IMPRESSION: Images obtained during pinning of the femoral neck  fracture. Excellent position and alignment. No complication evident.    JESSICA JIMENEZ MD         SYSTEM ID:  [id number]        Discharge Medications   Current Discharge Medication List      START taking these medications    Details   acetaminophen (TYLENOL) 325 MG tablet Take 2 tablets (650 mg) by mouth every 4 hours as needed for other (For optimal non-opioid multimodal pain management to improve pain control.)  Qty: 30 tablet, Refills: 0    Associated Diagnoses: Closed fracture of neck of right femur, initial encounter (H)      aspirin (ASA) 325 MG EC tablet Take 1 tablet (325 mg) by mouth daily  Qty: 30 tablet, Refills: 0    Associated Diagnoses: Closed fracture of neck of right femur, initial encounter (H)      ondansetron (ZOFRAN ODT) 4 MG ODT tab Take 1 tablet (4 mg) by mouth every 6 hours as needed for nausea or vomiting  Qty: 8 tablet, Refills: 0    Associated Diagnoses: Closed fracture of neck of right femur, initial encounter (H)      oxyCODONE (ROXICODONE) 5 MG tablet Take 0.5 tablets (2.5 mg) by mouth every 6 hours as needed for moderate to severe pain  Qty: 16 tablet, Refills: 0    Associated Diagnoses: Closed fracture of neck of right femur, initial encounter (H)      senna-docusate (SENOKOT-S/PERICOLACE) 8.6-50 MG tablet Take 1 tablet by mouth 2 times daily as needed for constipation  Qty: 20 tablet, Refills: 0    Associated Diagnoses: Closed fracture of neck of right femur, initial encounter (H)         CONTINUE these medications which have NOT CHANGED    Details   calcium carbonate 750 MG CHEW Take 1 chew tab by mouth daily       Multiple Vitamins-Minerals (MULTIVITAMIN WOMEN 50+ PO) Take 1 tablet by mouth daily      Multiple Vitamins-Minerals (PRESERVISION AREDS 2 PO) Take 1 capsule by mouth daily           Allergies   Allergies   Allergen Reactions     Sulfa Drugs

## 2022-11-04 NOTE — PROGRESS NOTES
Care Management Discharge Note    Discharge Date: 11/04/2022       Discharge Disposition: Home, Home Care    Discharge Services: None    Discharge DME:      Discharge Transportation: family or friend will provide    Private pay costs discussed: Not applicable    PAS Confirmation Code:    Patient/family educated on Medicare website which has current facility and service quality ratings:      Education Provided on the Discharge Plan:    Persons Notified of Discharge Plans:   Patient/Family in Agreement with the Plan: yes    Handoff Referral Completed:yes    Additional Information:  Patient ready for discharge today,  Orders signed and writer informed [facility] of discharge via e-mail        Yaquelin Liriano RN

## 2022-11-04 NOTE — PLAN OF CARE
Occupational Therapy Discharge Summary    Reason for therapy discharge:    All goals and outcomes met, no further needs identified.    Progress towards therapy goal(s). See goals on Care Plan in [facility] electronic health record for goal details.  Goals met    Therapy recommendation(s):    No further therapy is recommended.

## 2022-11-04 NOTE — PLAN OF CARE
PT-  Pt discharged home today with assist of significant other.  No further PT planned at this time.  PT goals met.

## 2022-11-04 NOTE — PLAN OF CARE

## 2022-11-07 ENCOUNTER — TELEPHONE (OUTPATIENT)
Dept: FAMILY MEDICINE | Facility: CLINIC | Age: 72
End: 2022-11-07

## 2022-11-07 ENCOUNTER — PATIENT OUTREACH (OUTPATIENT)
Dept: CARE COORDINATION | Facility: CLINIC | Age: 72
End: 2022-11-07

## 2022-11-07 NOTE — TELEPHONE ENCOUNTER
[facility] phone call message - order or referral request for patient:     Order or referral being requested: PT and OT evaluation and treatment    Skilled nursing 1X per week for 4 week and then every other week for 4 weeks.       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     OK to leave a message on voice mail? Yes    Primary language: English      needed? No    Call taken on November 7, 2022 at 8:37 AM by Samantha Cadena

## 2022-11-07 NOTE — PROGRESS NOTES
"Clinic Care Coordination Contact  [facility]: Post-Discharge Note  SITUATION                                                      Admission:    Admission Date: 10/31/22   Reason for Admission: Fall  Discharge:   Discharge Date: 11/04/22  Discharge Diagnosis: Closed fracture of neck of right femur    BACKGROUND                                                      Per hospital discharge summary and inpatient provider notes:This is a 72-year-old female with medical history which includes hypertension and is not on any medication presented to the hospital after mechanical fall and she was found to have right femoral neck fracture and orthopedics was consulted and patient underwent closed reduction and percutaneous screw fixation on 11/1/2022 and postoperatively she was seen by physical therapy, Occupational Therapy and orthopedics.  She was also started on aspirin for DVT prophylaxis per orthopedics.  Of note she had a low-grade fever when she came to the hospital but for COVID-19 was negative, UA and chest x-ray were normal.  Patient has been seen by physical therapy and Occupational Therapy and she is comfortable going home.  On day of discharge patient was in agreement with going home and will follow as outpatient with orthopedic  Home care orders signed      ASSESSMENT           Discharge Assessment  How are you doing now that you are home?: \" I am doing well \"  How are your symptoms? (Red Flag symptoms escalate to triage hotline per guidelines): Improved  Do you feel your condition is stable enough to be safe at home until your provider visit?: Yes  Does the patient have their discharge instructions? : Yes  Does the patient have questions regarding their discharge instructions? : No  Were you started on any new medications or were there changes to any of your previous medications? : Yes  Does the patient have all of their medications?: Yes  Do you have questions regarding any of your medications? : No  Do " you have all of your needed medical supplies or equipment (DME)?  (i.e. oxygen tank, CPAP, cane, etc.): Yes  Discharge follow-up appointment scheduled within 14 calendar days? : No    Post-op ([facility] CTA Only)  If the patient had a surgery or procedure, do they have any questions for a nurse?: No             PLAN                                                      Outpatient Plan: Follow-up with Dr Platt at [facility] 2 weeks following your surgery.   To arrange appointment or questions call: the care coordinator at [phone]  [facility] phone number: [phone]  [facility] [place] phone number: [phone]    Future Appointments   Date Time Provider Department Center   12/15/2022  9:00 AM Jessica Hudson [facility]         For any urgent concerns, please contact our 24 hour nurse triage line: [phone] ([phone])         Lesli Parham

## 2022-11-07 NOTE — TELEPHONE ENCOUNTER
Returned call to home care nurse to give verbal orders per protocol as requested. Nurse verbalized understanding.    Teresa Jhaveri RN

## 2022-11-11 ENCOUNTER — TELEPHONE (OUTPATIENT)
Dept: FAMILY MEDICINE | Facility: CLINIC | Age: 72
End: 2022-11-11

## 2022-11-11 NOTE — TELEPHONE ENCOUNTER
Returned call to home care PT, unable to reach. Left VM with verbal orders per protocol as requested. Left callback number for questions.    Teresa Jhaveri RN

## 2022-11-11 NOTE — TELEPHONE ENCOUNTER
[facility] phone call message - order or referral request for patient:     Order or referral being requested: Continued PT 1X per week for 4 weeks and then 1X every other week for 4 weeks    Also requesting discharge OT orders      OK to leave a message on voice mail? Yes    Primary language: English      needed? No    Call taken on November 11, 2022 at 8:41 AM by Samantha Cadena

## 2022-11-15 ENCOUNTER — DOCUMENTATION ONLY (OUTPATIENT)
Dept: FAMILY MEDICINE | Facility: CLINIC | Age: 72
End: 2022-11-15

## 2022-11-15 NOTE — PROGRESS NOTES
"When opening a documentation only encounter, be sure to enter in \"Chief Complaint\" Forms and in \" Comments\" Title of form, description if needed.    Carli is a 72 year old  female  Form received via: Fax  Form now resides in: Provider Ready    Gretchen Cain RN                  "

## 2022-11-21 NOTE — PROGRESS NOTES
Form has been completed by provider.     Form sent out via: Fax to [facility] at Fax Number: [phone]  Patient informed: N/A  Output date: November 21, 2022    Huong Parham MA      **Please close the encounter**

## 2022-12-06 ENCOUNTER — TELEPHONE (OUTPATIENT)
Dept: FAMILY MEDICINE | Facility: CLINIC | Age: 72
End: 2022-12-06

## 2022-12-06 DIAGNOSIS — S72.001A CLOSED FRACTURE OF NECK OF RIGHT FEMUR, INITIAL ENCOUNTER (H): Primary | ICD-10-CM

## 2022-12-06 NOTE — TELEPHONE ENCOUNTER
[facility] phone call message - order or referral request for patient:     Order or referral being requested: Outpatient PT to [facility] for balance, strength, and endurance training       Additional Comments:     OK to leave a message on voice mail? Yes    Primary language: English      needed? No    Call taken on December 6, 2022 at 2:39 PM by Neto Dunham

## 2022-12-06 NOTE — TELEPHONE ENCOUNTER
Marimar from outpatient therapy needs an internal referral for patient to do PT. RN unable to as it isn't in standard work process. Routing to PCP.     Anastacia Perdomo RN

## 2022-12-08 ENCOUNTER — TELEPHONE (OUTPATIENT)
Dept: PSYCHOLOGY | Facility: CLINIC | Age: 72
End: 2022-12-08

## 2022-12-08 NOTE — TELEPHONE ENCOUNTER
[facility] phone call message - order or referral request for patient:     Order or referral being requested: Update PT and nursing frequency to 1x a week for 1 week due to patient starting outpatient therapy on 12/21       Additional Comments:     OK to leave a message on voice mail? Yes    Primary language: English      needed? No    Call taken on December 8, 2022 at 12:36 PM by Neto Dunham

## 2022-12-08 NOTE — TELEPHONE ENCOUNTER
Called and left message on identified vm for ok for change in PT and nursing frequency.    Gretchen Cain RN

## 2022-12-12 ENCOUNTER — TELEPHONE (OUTPATIENT)
Dept: FAMILY MEDICINE | Facility: CLINIC | Age: 72
End: 2022-12-12

## 2022-12-12 DIAGNOSIS — R93.89 ABNORMAL FINDINGS ON DIAGNOSTIC IMAGING OF OTHER SPECIFIED BODY STRUCTURES: ICD-10-CM

## 2022-12-12 DIAGNOSIS — M85.859 OSTEOPENIA OF NECK OF FEMUR, UNSPECIFIED LATERALITY: Primary | ICD-10-CM

## 2022-12-13 ENCOUNTER — LAB (OUTPATIENT)
Dept: LAB | Facility: CLINIC | Age: 72
End: 2022-12-13
Payer: MEDICARE

## 2022-12-13 ENCOUNTER — DOCUMENTATION ONLY (OUTPATIENT)
Dept: FAMILY MEDICINE | Facility: CLINIC | Age: 72
End: 2022-12-13

## 2022-12-13 DIAGNOSIS — R93.89 ABNORMAL FINDINGS ON DIAGNOSTIC IMAGING OF OTHER SPECIFIED BODY STRUCTURES: ICD-10-CM

## 2022-12-13 DIAGNOSIS — M85.859 OSTEOPENIA OF NECK OF FEMUR, UNSPECIFIED LATERALITY: ICD-10-CM

## 2022-12-13 LAB
ALBUMIN SERPL BCG-MCNC: 4.5 G/DL (ref 3.5–5.2)
ANION GAP SERPL CALCULATED.3IONS-SCNC: 12 MMOL/L (ref 7–15)
BUN SERPL-MCNC: 23.6 MG/DL (ref 8–23)
CALCIUM SERPL-MCNC: 9.1 MG/DL (ref 8.8–10.2)
CALCIUM SERPL-MCNC: 9.1 MG/DL (ref 8.8–10.2)
CHLORIDE SERPL-SCNC: 105 MMOL/L (ref 98–107)
CREAT SERPL-MCNC: 0.98 MG/DL (ref 0.51–0.95)
DEPRECATED HCO3 PLAS-SCNC: 26 MMOL/L (ref 22–29)
GFR SERPL CREATININE-BSD FRML MDRD: 61 ML/MIN/1.73M2
GLUCOSE SERPL-MCNC: 71 MG/DL (ref 70–99)
PHOSPHATE SERPL-MCNC: 3.4 MG/DL (ref 2.5–4.5)
POTASSIUM SERPL-SCNC: 4.4 MMOL/L (ref 3.4–5.3)
SODIUM SERPL-SCNC: 143 MMOL/L (ref 136–145)
TSH SERPL DL<=0.005 MIU/L-ACNC: 1.61 UIU/ML (ref 0.3–4.2)

## 2022-12-13 PROCEDURE — 36415 COLL VENOUS BLD VENIPUNCTURE: CPT

## 2022-12-13 PROCEDURE — 84443 ASSAY THYROID STIM HORMONE: CPT

## 2022-12-13 PROCEDURE — 80069 RENAL FUNCTION PANEL: CPT

## 2022-12-13 NOTE — PROGRESS NOTES
"Form has been completed by provider.     Form sent out via: Fax to [phone] at Fax Number: [facility]  Patient informed: No  Output date: December 16, 2022    Gretchen Cain RN      **Please close the encounter**    When opening a documentation only encounter, be sure to enter in \"Chief Complaint\" Forms and in \" Comments\" Title of form, description if needed.    Carli is a 72 year old  female  Form received via: Fax  Form now resides in: Provider Ready    Gretchen Cain RN                  "

## 2022-12-13 NOTE — PROGRESS NOTES
"Form has been completed by provider.     Form sent out via: Fax to [phone] at Fax Number: [facility]  Patient informed: No  Output date: December 16, 2022    Gretchen Cain RN      **Please close the encounter**    When opening a documentation only encounter, be sure to enter in \"Chief Complaint\" Forms and in \" Comments\" Title of form, description if needed.    Carli is a 72 year old  female  Form received via: Fax  Form now resides in: Provider Ready    Gretchen Cain RN                  "

## 2022-12-21 ENCOUNTER — THERAPY VISIT (OUTPATIENT)
Dept: PHYSICAL THERAPY | Facility: CLINIC | Age: 72
End: 2022-12-21
Attending: FAMILY MEDICINE
Payer: MEDICARE

## 2022-12-21 DIAGNOSIS — S72.001A CLOSED FRACTURE OF NECK OF RIGHT FEMUR, INITIAL ENCOUNTER (H): ICD-10-CM

## 2022-12-21 PROCEDURE — 97161 PT EVAL LOW COMPLEX 20 MIN: CPT | Mod: GP | Performed by: PHYSICAL THERAPIST

## 2022-12-21 PROCEDURE — 97110 THERAPEUTIC EXERCISES: CPT | Mod: GP | Performed by: PHYSICAL THERAPIST

## 2022-12-21 NOTE — PROGRESS NOTES
Physical Therapy Initial Evaluation - Hip    Therapist Impression:  Patient presents with signs and symptoms consistent with R hip pain secondary to closed reduction and percutaneous screw fixation on 11/1/22. Patient demonstrates impairments including decreased R IR/ER range of motion and generalized R hip strength. Patient's functional limitations include sitting for longer periods of time, stepping off a stool and standing on that single leg.     Subjective:  Carli Michelle is a 72 year old female. HPI given by patient  Patient's chief complaints: Patient feels like she is doing really well and everyone has told her this too, she was exercising prior to what had happened. She sometimes has a difficult time finding a comfortable position at night. Patient feeling that she gets hyperextension in the R leg and R knee, happening maybe 1x/month instead of 1x/year.    Condition occurred due to falling, landed on cement  Date of Onset: 10/31/22  Location of symptoms is lateral R hip  Symptoms other than pain include: aching pain at night, or getting it with sitting for a bit, occasionally moving the leg in a wrong way but unsure what position that is in  Pain dependence on time of day is: night time.   Pain rating is: 1/10.    Symptoms are exacerbated by: sitting for longer periods of time, stepping off a stool  Symptoms are relieved by:  OTC meds.    Progression of symptoms is that symptoms are: Overall getting better    Previous treatments include: home PT, regular class at the  that she has been going to for over a decade, walking, hiking, gardening, cross country skiing  Patient reports that general health is: excellent.     Current occupation is: retired  Primary job tasks include: tries to stay pretty active throughout the day prior to this injury    Red flags include: none at this time    Patient's expectations for therapy include: greatest concerns are getting back to what she was doing  before    Pertinent medical history includes:      Past Medical History:   Diagnosis Date     Atrophic vaginitis      Medical allergies includes: Sulfa drugs  Surgical history includes:   Past Surgical History:   Procedure Laterality Date     APPENDECTOMY  3/2012     CLOSED REDUCTION, PERCUTANEOUS PINNING HIP Right 11/1/2022    Procedure: CLOSED REDUCTION, RIGHT HIP WITH PERCUTANEOUS PINNING.;  Surgeon: Arian Platt MD;  Location:  OR       Current Outpatient Medications:      acetaminophen (TYLENOL) 325 MG tablet, Take 2 tablets (650 mg) by mouth every 4 hours as needed for other (For optimal non-opioid multimodal pain management to improve pain control.), Disp: 30 tablet, Rfl: 0     aspirin (ASA) 325 MG EC tablet, Take 1 tablet (325 mg) by mouth daily, Disp: 30 tablet, Rfl: 0     calcium carbonate 750 MG CHEW, Take 1 chew tab by mouth daily , Disp: , Rfl:      Multiple Vitamins-Minerals (MULTIVITAMIN WOMEN 50+ PO), Take 1 tablet by mouth daily, Disp: , Rfl:      Multiple Vitamins-Minerals (PRESERVISION AREDS 2 PO), Take 1 capsule by mouth daily, Disp: , Rfl:      ondansetron (ZOFRAN ODT) 4 MG ODT tab, Take 1 tablet (4 mg) by mouth every 6 hours as needed for nausea or vomiting, Disp: 8 tablet, Rfl: 0     oxyCODONE (ROXICODONE) 5 MG tablet, Take 0.5 tablets (2.5 mg) by mouth every 6 hours as needed for moderate to severe pain, Disp: 16 tablet, Rfl: 0     senna-docusate (SENOKOT-S/PERICOLACE) 8.6-50 MG tablet, Take 1 tablet by mouth 2 times daily as needed for constipation, Disp: 20 tablet, Rfl: 0    HIP:    PROM:   L  R   Flexion     Extension     Abduction     IR  limited   ER  Limited > IR      Strength: WFL - limited in IR/ER but 4/5    L R   HIP     Flex  4   Ext     Abd     KNEE     Flex     Ext       Palpation: TTL along R ITB and lateral hip, TFL    Functional: no visible change or difficulty - limited standing on RLE alone due to strength    Gait: slight limp on RLE due to  weakness    Assessment/Plan:    Patient is a 72 year old female with right side hip complaints.    Patient has the following significant findings with corresponding treatment plan.                Diagnosis 1:  S/p R hip fracture  Pain -  hot/cold therapy, electric stimulation, manual therapy and splint/taping/bracing/orthotics  Decreased ROM/flexibility - manual therapy and therapeutic exercise  Decreased joint mobility - manual therapy and therapeutic exercise  Decreased strength - therapeutic exercise and therapeutic activities    Therapy Evaluation Codes:     Cumulative Therapy Evaluation is: Low complexity.    Previous and current functional limitations:  (See Goal Flow Sheet for this information)    Short term and Long term goals: (See Goal Flow Sheet for this information)     Communication ability:  Patient appears to be able to clearly communicate and understand verbal and written communication and follow directions correctly.  Treatment Explanation - The following has been discussed with the patient:   RX ordered/plan of care  Anticipated outcomes  Possible risks and side effects  This patient would benefit from PT intervention to resume normal activities.   Rehab potential is good.    Frequency:  1 X week, once daily  Duration:  for 4 weeks tapering to 2 X a month over 6 weeks  Discharge Plan:  Achieve all LTG.  Independent in home treatment program.  Reach maximal therapeutic benefit.    Please refer to the daily flowsheet for treatment today, total treatment time and time spent performing 1:1 timed codes.     Samantha High, PT

## 2022-12-21 NOTE — PROGRESS NOTES
[facility]    OUTPATIENT Physical Therapy ORTHOPEDIC EVALUATION  PLAN OF TREATMENT FOR OUTPATIENT REHABILITATION  (COMPLETE FOR INITIAL CLAIMS ONLY)  Patient's Last Name, First Name, M.I.  YOB: 1950  Carli Michelle    Provider s Name:  [facility]   Medical Record No.  [id number]   Start of Care Date:  12/21/22   Onset Date:  11/01/2210/31/22   Treatment Diagnosis:  R hip fracture Medical Diagnosis:  Closed fracture of neck of right femur, initial encounter (H)       Goals:     12/21/22 0500   Body Part   Goals listed below are for R hip fracture   Goal #1   Goal #1 standing   Current Functional Level Minutes patient can stand   Performance level 5 minutes on RLE without difficulty and P   STG Target Performance Minutes patient will be able to stand   Performance level 20 min without increased P or difficulty on RLE   Rationale for housekeeping tasks such as vacuuming, bed making, mowing, gardening;for personal hygiene;for meal preparation;for safe household ambulation;for safe community ambulation   Due date 02/04/23   LTG Target Performance Minutes patient will be able to stand   Performance Level 60 minutes on RLE without increased symptoms or difficulty   Rationale for housekeeping tasks such as vacuuming, bed making, mowing;for personal hygiene;for meal preparation;for safe household ambulation;for safe community ambulation   Due date 03/21/23         Therapy Frequency:  1x/week  Predicted Duration of Therapy Intervention:  10 weeks    JEN CROSS, PT                 I CERTIFY THE NEED FOR THESE SERVICES FURNISHED UNDER        THIS PLAN OF TREATMENT AND WHILE UNDER MY CARE     (Physician attestation of this document indicates review and certification of the therapy plan).                     Certification Date From:  12/21/22   Certification Date To:   03/21/23    Referring Provider:  Houston Menchaca    Initial Assessment        See Epic Evaluation SOC Date: 12/21/22

## 2022-12-28 ENCOUNTER — THERAPY VISIT (OUTPATIENT)
Dept: PHYSICAL THERAPY | Facility: CLINIC | Age: 72
End: 2022-12-28
Attending: FAMILY MEDICINE
Payer: MEDICARE

## 2022-12-28 DIAGNOSIS — S72.001A CLOSED FRACTURE OF NECK OF RIGHT FEMUR, INITIAL ENCOUNTER (H): Primary | ICD-10-CM

## 2022-12-28 PROCEDURE — 97110 THERAPEUTIC EXERCISES: CPT | Mod: GP | Performed by: PHYSICAL THERAPIST

## 2023-01-03 ENCOUNTER — HOSPITAL ENCOUNTER (OUTPATIENT)
Dept: BONE DENSITY | Facility: CLINIC | Age: 73
Discharge: HOME | End: 2023-01-03
Attending: NURSE PRACTITIONER | Admitting: NURSE PRACTITIONER
Payer: MEDICARE

## 2023-01-03 DIAGNOSIS — Z78.0 POSTMENOPAUSAL: ICD-10-CM

## 2023-01-03 DIAGNOSIS — S72.001A HIP FRACTURE, RIGHT (H): ICD-10-CM

## 2023-01-03 DIAGNOSIS — Z00.00 HEALTHCARE MAINTENANCE: ICD-10-CM

## 2023-01-03 DIAGNOSIS — Z47.89 AFTERCARE FOLLOWING SURGERY OF THE MUSCULOSKELETAL SYSTEM: ICD-10-CM

## 2023-01-03 PROCEDURE — 77080 DXA BONE DENSITY AXIAL: CPT

## 2023-01-11 ENCOUNTER — THERAPY VISIT (OUTPATIENT)
Dept: PHYSICAL THERAPY | Facility: CLINIC | Age: 73
End: 2023-01-11
Payer: MEDICARE

## 2023-01-11 DIAGNOSIS — S72.001A CLOSED FRACTURE OF NECK OF RIGHT FEMUR, INITIAL ENCOUNTER (H): Primary | ICD-10-CM

## 2023-01-11 PROCEDURE — 97110 THERAPEUTIC EXERCISES: CPT | Mod: GP | Performed by: PHYSICAL THERAPIST

## 2023-01-23 ENCOUNTER — THERAPY VISIT (OUTPATIENT)
Dept: PHYSICAL THERAPY | Facility: CLINIC | Age: 73
End: 2023-01-23
Payer: MEDICARE

## 2023-01-23 DIAGNOSIS — G89.29 CHRONIC RIGHT SHOULDER PAIN: ICD-10-CM

## 2023-01-23 DIAGNOSIS — S72.001A CLOSED FRACTURE OF NECK OF RIGHT FEMUR, INITIAL ENCOUNTER (H): Primary | ICD-10-CM

## 2023-01-23 DIAGNOSIS — M25.511 CHRONIC RIGHT SHOULDER PAIN: ICD-10-CM

## 2023-01-23 DIAGNOSIS — M75.41 IMPINGEMENT SYNDROME OF SHOULDER REGION, RIGHT: ICD-10-CM

## 2023-01-23 PROCEDURE — 97110 THERAPEUTIC EXERCISES: CPT | Mod: GP | Performed by: PHYSICAL THERAPIST

## 2023-02-08 ENCOUNTER — THERAPY VISIT (OUTPATIENT)
Dept: PHYSICAL THERAPY | Facility: CLINIC | Age: 73
End: 2023-02-08
Payer: MEDICARE

## 2023-02-08 DIAGNOSIS — S72.001A CLOSED FRACTURE OF NECK OF RIGHT FEMUR, INITIAL ENCOUNTER (H): Primary | ICD-10-CM

## 2023-02-08 PROCEDURE — 97110 THERAPEUTIC EXERCISES: CPT | Mod: GP | Performed by: PHYSICAL THERAPIST

## 2023-03-01 ENCOUNTER — THERAPY VISIT (OUTPATIENT)
Dept: PHYSICAL THERAPY | Facility: CLINIC | Age: 73
End: 2023-03-01
Payer: MEDICARE

## 2023-03-01 DIAGNOSIS — S72.001A CLOSED FRACTURE OF NECK OF RIGHT FEMUR, INITIAL ENCOUNTER (H): Primary | ICD-10-CM

## 2023-03-01 PROCEDURE — 97110 THERAPEUTIC EXERCISES: CPT | Mod: GP | Performed by: PHYSICAL THERAPIST

## 2023-03-08 ENCOUNTER — VIRTUAL VISIT (OUTPATIENT)
Dept: ONCOLOGY | Facility: CLINIC | Age: 73
End: 2023-03-08
Attending: GENETIC COUNSELOR, MS
Payer: MEDICARE

## 2023-03-08 ENCOUNTER — TELEPHONE (OUTPATIENT)
Dept: ONCOLOGY | Facility: CLINIC | Age: 73
End: 2023-03-08
Payer: MEDICARE

## 2023-03-08 DIAGNOSIS — Z80.8 FAMILY HISTORY OF GLIOBLASTOMA: ICD-10-CM

## 2023-03-08 DIAGNOSIS — Z80.8 FAMILY HISTORY OF MALIGNANT MELANOMA: ICD-10-CM

## 2023-03-08 DIAGNOSIS — Z80.9 FAMILY HISTORY OF CANCER IN SISTER: ICD-10-CM

## 2023-03-08 DIAGNOSIS — Z80.0 FAMILY HISTORY OF COLON CANCER: ICD-10-CM

## 2023-03-08 DIAGNOSIS — Z80.3 FAMILY HISTORY OF MALIGNANT NEOPLASM OF BREAST: Primary | ICD-10-CM

## 2023-03-08 PROCEDURE — 96040 HC GENETIC COUNSELING, EACH 30 MINUTES: CPT | Mod: GT | Performed by: GENETIC COUNSELOR, MS

## 2023-03-08 NOTE — NURSING NOTE
Is the patient currently in the state of [place]? YES    Visit mode:VIDEO    If the visit is dropped, the patient can be reconnected by: VIDEO VISIT: Send to e-mail at: [email or web address]    Will anyone else be joining the visit? NO      How would you like to obtain your AVS? MyChart    Are changes needed to the allergy or medication list? NO    Reason for visit: mike PALACIOS

## 2023-03-08 NOTE — Clinical Note
Please print and send a copy of this letter to the patient.    Jessica Hudson MS, [facility]  Licensed, Certified Genetic Counselor  [facility]  [phone]  [email or web address]

## 2023-03-08 NOTE — Clinical Note
3/8/2023         RE: Carli Michelle  [address]  [place] [facility] [place] [id number]        Dear Colleague,    Thank you for referring your patient, Carli Michelle, to the [facility]. Please see a copy of my visit note below.    Video-Visit Details  Type of service:  Video Visit  Video Start Time (time video started): 9:00am  Video End Time (time video stopped): 10:17am  Originating Location (pt. Location): Home  Distant Location (provider location):  Off-site  Mode of Communication:  Video Conference via [employer]    3/8/2023    Referring Provider: Houston Menchaca MD    Present for Today's Visit: Carli    Presenting Information:   I met with Carli Michelle today for genetic counseling (video visit due to covid19) to discuss her family history of cancer.  She is here today to review this history, cancer screening recommendations, and available genetic testing options.    Personal History:  Carli is a 72 year old female. She does not have any personal history of cancer.      She had her first menstrual period at age 10, she does not have children, and reports that she went through menopause at age 49. Carli has her ovaries, fallopian tubes and uterus in place.  She reports past history of oral contraceptive use for about 20 years and that she was using premarin for vaginal dryness for a few years after menopause.      Her most recent mammogram in August 2022 was normal and her breast density was reported as heterogeneously dense. She reports that she had a marker placed in right breast many years ago and denies any other breast-related history. Most recent colonoscopy in 2016 was normal and follow-up was recommended in 10 years. She has not yet started colonoscopy screening given her age. She does not regularly do any other cancer screening at this time.  Carli reported no tobacco use, and about 14 drinks per week (2 one once drinks per day) for alcohol  use.    Family History: Cancer family history and pertinent information reviewed below (Please see scanned pedigree for detailed family history information)    Sister, age 70, was diagnosed with lung cancer (lepidic adenocarcinoma) at age 69; no reported smoking history. She underwent genetic counseling and Carli reports that she also underwent genetic testing and no genetic risk was flagged, however the records Carli received from her sister didn't seem to contain the results from the testing itself.    Mother passed at age 102 with a history of breast cancer diagnosed at age 49/50. Carli reports that her mother underwent a hysterectomy for an unknown indication in her 40's (unknown status of ovaries).     Two maternal aunts with breast cancer history in their 80's.     Maternal grandmother with breast cancer history diagnosed after age 50.     Maternal first-cousin passed at age 70 from a melanoma of her sinus diagnosed at age 67. She did use occupational cleaning chemicals. This cousin has a son who passed at age 50 from colon cancer diagnosed at age 50.     Maternal first-cousin once removed (her maternal aunt Calos's granddaughter) passed at age 33 from a glioblastoma initially diagnosed at age 18.     Paternal uncle had a history of lung cancer in his 70's.     Paternal fist-cousin passed at age 37 from breast cancer diagnosed at age 35.     Her ethnicity is [place] and [place].  There is no known Ashkenazi Jain ancestry on either side of her family. There is no reported consanguinity.    Discussion:    Carli's family history of cancer is suggestive of a hereditary cancer syndrome.    We reviewed the features of sporadic, familial, and hereditary cancers. In looking at Carli's family history, it is possible that a cancer susceptibility gene is present due to her mother's early-onset breast cancer history, her two maternal aunt's breast cancer history, and her maternal grandmother's breast cancer  history. Additionally, her paternal first-cousin passed from early-onset breast cancer.     We discussed the natural history and genetics of hereditary cancers. A detailed handout regarding the information we discussed will be sent to Carli via [employer] and in [place] mail. Topics included: inheritance pattern, cancer risks, cancer screening recommendations, and also risks, benefits and limitations of testing.  We reviewed that the most common cause of hereditary breast cancer is Hereditary Breast and Ovarian Cancer (HBOC) syndrome, which is caused by mutations in the genes BRCA1 and BRCA2. Women with a mutation in either of these genes are at increased risk for breast and ovarian cancer. There is also an increased risk for a second primary breast cancer for women. Men with a mutation in either BRCA1 or BRCA2 are at increased risk for male breast and prostate cancer. Both women and men may also be at increased risk for pancreatic cancer and melanoma.     Based on her family history, Carli meets current National Comprehensive Cancer Network (NCCN) criteria for genetic testing of high-penetrance breast cancer susceptibility genes (BRCA1, BRCA2, CDH1, PALB2, PTEN, and TP53).      We discussed that there are additional genes that could cause increased risk for breast and related cancers. As many of these genes present with overlapping features in a family and accurate cancer risk cannot always be established based upon the pedigree analysis alone, it would be reasonable for Carli to consider panel genetic testing to analyze multiple genes at once.    We reviewed genetic testing options given Carli's family history including targeted and expanded options. After our discussion, Carli opted to proceed with genetic testing via BRCA1/2 analysis with automatic reflex to the Common Hereditary Cancers panel through [facility].   Genetic testing is available for 47 genes associated with cancers of the breast, ovary, uterus, prostate  and gastrointestinal system: [facility] Common Hereditary Cancers panel (APC, JOHN, AXIN2, BARD1, BMPR1A, BRCA1, BRCA2, BRIP1, CDH1, CDK4, CDKN2A, CHEK2, CTNNA1, DICER1, EPCAM, GREM1, HOXB13, KIT, MEN1, MLH1, MSH2, MSH3, MSH6, MUTYH, NBN, NF1, NTHL1, PALB2, PDGFRA, PMS2, POLD1, POLE, PTEN,RAD50, RAD51C, RAD51D, SDHA, SDHB, SDHC, SDHD, SMAD4, SMARCA4, STK11, TP53,TSC1, TSC2, VHL).    We discussed that many of these genes are associated with specific hereditary cancer syndromes and published management guidelines: Hereditary Breast and Ovarian Cancer syndrome (BRCA1, BRCA2), Davalos syndrome (MLH1, MSH2, MSH6, PMS2, EPCAM), Familial Adenomatous Polyposis (APC), Hereditary Diffuse Gastric Cancer (CDH1), Familial Atypical Multiple Mole Melanoma syndrome (CDK4, CDKN2A), Multiple Endocrine Neoplasia type 1 (MEN1), Juvenile Polyposis syndrome (BMPR1A, SMAD4), Cowden syndrome (PTEN), Li Fraumeni syndrome (TP53), Hereditary Paraganglioma and Pheochromocytoma syndrome (SDHA, SDHB, SDHC, SDHD), Peutz-Jeghers syndrome (STK11), MUTYH Associated Polyposis (MUTYH), Tuberous sclerosis complex (TSC1, TSC2), Von Hippel-Lindau disease (VHL), and Neurofibromatosis type 1 (NF1).   The JOHN, AXIN2, BRIP1, CHEK2, GREM1, MSH3, NBN, NTHL1, PALB2, POLD1, POLE, RAD51C, and RAD51D genes are associated with increased cancer risk and have published management guidelines for certain cancers.   The remaining genes (BARD1, CTNNA1, DICER1, HOXB13, KIT, PDGFRA, RAD50, and SMARCA4) are associated with increased cancer risk and may allow us to make medical recommendations when mutations are identified.     Consent was obtained over video with no witness required due to the current covid19 global pandemic.    Medical Management: For Carli, we reviewed that the information from genetic testing may determine:    additional cancer screening for which Carli may qualify (i.e. mammogram and breast MRI, more frequent colonoscopies, more frequent dermatologic exams,  etc.),    options for risk reducing surgeries Carli could consider (i.e. bilateral mastectomy, surgery to remove her ovaries and/or uterus, etc.),      and targeted chemotherapies for Carli if she were to develop certain cancers in the future (i.e. immunotherapy for individuals with Davalos syndrome, PARP inhibitors, etc.).     These recommendations will be discussed in detail once genetic testing is completed.     Carli will schedule a lab only appointment at any [facility] clinic at her earliest convenience.     Plan:  1) Today Carli elected to proceed with BRCA1/2 analysis with automatic reflex to Common Hereditary Cancers panel genetic testing (47 genes) through [facility].  2) This information should be available in approximately 3 weeks.  3) Carli will be contacted by our scheduling department to set up a result disclosure appointment.     Jessica Hudson MS, [facility]  Licensed, Certified Genetic Counselor  [facility]  [email or web address]            Again, thank you for allowing me to participate in the care of your patient.        Sincerely,        Jessica Wang GC

## 2023-03-08 NOTE — PROGRESS NOTES
Video-Visit Details  Type of service:  Video Visit  Video Start Time (time video started): 9:00am  Video End Time (time video stopped): 10:17am  Originating Location (pt. Location): Home  Distant Location (provider location):  Off-site  Mode of Communication:  Video Conference via [employer]    3/8/2023    Referring Provider: Houston Menchaca MD    Present for Today's Visit: Carli    Presenting Information:   I met with Carli Michelle today for genetic counseling (video visit due to covid19) to discuss her family history of cancer.  She is here today to review this history, cancer screening recommendations, and available genetic testing options.    Personal History:  Carli is a 72 year old female. She does not have any personal history of cancer.      She had her first menstrual period at age 10, she does not have children, and reports that she went through menopause at age 49. Carli has her ovaries, fallopian tubes and uterus in place.  She reports past history of oral contraceptive use for about 20 years and that she was using premarin for vaginal dryness for a few years after menopause.      Her most recent mammogram in August 2022 was normal and her breast density was reported as heterogeneously dense. She reports that she had a marker placed in right breast many years ago and denies any other breast-related history. Most recent colonoscopy in 2016 was normal and follow-up was recommended in 10 years. She has not yet started colonoscopy screening given her age. She does not regularly do any other cancer screening at this time.  Carli reported no tobacco use, and about 14 drinks per week (2 one once drinks per day) for alcohol use.    Family History: Cancer family history and pertinent information reviewed below (Please see scanned pedigree for detailed family history information)    Sister, age 70, was diagnosed with lung cancer (lepidic adenocarcinoma) at age 69; no reported smoking history. She  underwent genetic counseling and Carli reports that she also underwent genetic testing and no genetic risk was flagged, however the records Carli received from her sister didn't seem to contain the results from the testing itself.    Mother passed at age 102 with a history of breast cancer diagnosed at age 49/50. Carli reports that her mother underwent a hysterectomy for an unknown indication in her 40's (unknown status of ovaries).     Two maternal aunts with breast cancer history in their 80's.     Maternal grandmother with breast cancer history diagnosed after age 50.     Maternal first-cousin passed at age 70 from a melanoma of her sinus diagnosed at age 67. She did use occupational cleaning chemicals. This cousin has a son who passed at age 50 from colon cancer diagnosed at age 50.     Maternal first-cousin once removed (her maternal aunt Calos's granddaughter) passed at age 33 from a glioblastoma initially diagnosed at age 18.     Paternal uncle had a history of lung cancer in his 70's.     Paternal fist-cousin passed at age 37 from breast cancer diagnosed at age 35.     Her ethnicity is [place] and [place].  There is no known Ashkenazi Taoism ancestry on either side of her family. There is no reported consanguinity.    Discussion:    Carli's family history of cancer is suggestive of a hereditary cancer syndrome.    We reviewed the features of sporadic, familial, and hereditary cancers. In looking at Carli's family history, it is possible that a cancer susceptibility gene is present due to her mother's early-onset breast cancer history, her two maternal aunt's breast cancer history, and her maternal grandmother's breast cancer history. Additionally, her paternal first-cousin passed from early-onset breast cancer.     We discussed the natural history and genetics of hereditary cancers. A detailed handout regarding the information we discussed will be sent to Carli via [employer] and in [place] mail. Topics  included: inheritance pattern, cancer risks, cancer screening recommendations, and also risks, benefits and limitations of testing.  We reviewed that the most common cause of hereditary breast cancer is Hereditary Breast and Ovarian Cancer (HBOC) syndrome, which is caused by mutations in the genes BRCA1 and BRCA2. Women with a mutation in either of these genes are at increased risk for breast and ovarian cancer. There is also an increased risk for a second primary breast cancer for women. Men with a mutation in either BRCA1 or BRCA2 are at increased risk for male breast and prostate cancer. Both women and men may also be at increased risk for pancreatic cancer and melanoma.     Based on her family history, Carli meets current National Comprehensive Cancer Network (NCCN) criteria for genetic testing of high-penetrance breast cancer susceptibility genes (BRCA1, BRCA2, CDH1, PALB2, PTEN, and TP53).      We discussed that there are additional genes that could cause increased risk for breast and related cancers. As many of these genes present with overlapping features in a family and accurate cancer risk cannot always be established based upon the pedigree analysis alone, it would be reasonable for Carli to consider panel genetic testing to analyze multiple genes at once.    We reviewed genetic testing options given Carli's family history including targeted and expanded options. After our discussion, Carli opted to proceed with genetic testing via BRCA1/2 analysis with automatic reflex to the Common Hereditary Cancers panel through [facility].   Genetic testing is available for 47 genes associated with cancers of the breast, ovary, uterus, prostate and gastrointestinal system: [facility] Common Hereditary Cancers panel (APC, JOHN, AXIN2, BARD1, BMPR1A, BRCA1, BRCA2, BRIP1, CDH1, CDK4, CDKN2A, CHEK2, CTNNA1, DICER1, EPCAM, GREM1, HOXB13, KIT, MEN1, MLH1, MSH2, MSH3, MSH6, MUTYH, NBN, NF1, NTHL1, PALB2, PDGFRA, PMS2, POLD1,  POLE, PTEN,RAD50, RAD51C, RAD51D, SDHA, SDHB, SDHC, SDHD, SMAD4, SMARCA4, STK11, TP53,TSC1, TSC2, VHL).    We discussed that many of these genes are associated with specific hereditary cancer syndromes and published management guidelines: Hereditary Breast and Ovarian Cancer syndrome (BRCA1, BRCA2), Davalos syndrome (MLH1, MSH2, MSH6, PMS2, EPCAM), Familial Adenomatous Polyposis (APC), Hereditary Diffuse Gastric Cancer (CDH1), Familial Atypical Multiple Mole Melanoma syndrome (CDK4, CDKN2A), Multiple Endocrine Neoplasia type 1 (MEN1), Juvenile Polyposis syndrome (BMPR1A, SMAD4), Cowden syndrome (PTEN), Li Fraumeni syndrome (TP53), Hereditary Paraganglioma and Pheochromocytoma syndrome (SDHA, SDHB, SDHC, SDHD), Peutz-Jeghers syndrome (STK11), MUTYH Associated Polyposis (MUTYH), Tuberous sclerosis complex (TSC1, TSC2), Von Hippel-Lindau disease (VHL), and Neurofibromatosis type 1 (NF1).   The JOHN, AXIN2, BRIP1, CHEK2, GREM1, MSH3, NBN, NTHL1, PALB2, POLD1, POLE, RAD51C, and RAD51D genes are associated with increased cancer risk and have published management guidelines for certain cancers.   The remaining genes (BARD1, CTNNA1, DICER1, HOXB13, KIT, PDGFRA, RAD50, and SMARCA4) are associated with increased cancer risk and may allow us to make medical recommendations when mutations are identified.     Consent was obtained over video with no witness required due to the current covid19 global pandemic.    Medical Management: For Carli, we reviewed that the information from genetic testing may determine:    additional cancer screening for which Carli may qualify (i.e. mammogram and breast MRI, more frequent colonoscopies, more frequent dermatologic exams, etc.),    options for risk reducing surgeries Carli could consider (i.e. bilateral mastectomy, surgery to remove her ovaries and/or uterus, etc.),      and targeted chemotherapies for Carli if she were to develop certain cancers in the future (i.e. immunotherapy for  individuals with Davalos syndrome, PARP inhibitors, etc.).     These recommendations will be discussed in detail once genetic testing is completed.     Carli will schedule a lab only appointment at any [facility] clinic at her earliest convenience.     Plan:  1) Today Carli elected to proceed with BRCA1/2 analysis with automatic reflex to Common Hereditary Cancers panel genetic testing (47 genes) through [facility].  2) This information should be available in approximately 3 weeks.  3) Carli will be contacted by our scheduling department to set up a result disclosure appointment.     Jessica Hudson MS, [facility]  Licensed, Certified Genetic Counselor  [facility]  [email or web address]

## 2023-03-08 NOTE — LETTER
Cancer Risk Management  Program Locations    [facility]  [facility]  [facility]  [facility]  [facility]  Mailing Address  Cancer Risk Management Program  [facility]  [address]  [place] [address]    New patient appointments  [phone]  March 8, 2023      Carli C Miguel Angel  [address]  [facility] [address]      Dear Carli,    It was a pleasure speaking with you on the phone on 3/8/2023.  Here is a copy of the progress note from our discussion. If you have any additional questions, please feel free to call.    Referring Provider: Houston Menchaca MD    Present for Today's Visit: Carli    Presenting Information:   I met with Carli Miguel Angel today for genetic counseling (video visit due to covid19) to discuss her family history of cancer.  She is here today to review this history, cancer screening recommendations, and available genetic testing options.    Personal History:  Carli is a 72 year old female. She does not have any personal history of cancer.      She had her first menstrual period at age 10, she does not have children, and reports that she went through menopause at age 49. Carli has her ovaries, fallopian tubes and uterus in place.  She reports past history of oral contraceptive use for about 20 years and that she was using premarin for vaginal dryness for a few years after menopause.      Her most recent mammogram in August 2022 was normal and her breast density was reported as heterogeneously dense. She reports that she had a marker placed in right breast many years ago and denies any other breast-related history. Most recent colonoscopy in 2016 was normal and follow-up was recommended in 10 years. She has not yet started colonoscopy screening given her age. She does not regularly do any other cancer screening at this  time.  Carli reported no tobacco use, and about 14 drinks per week (2 one once drinks per day) for alcohol use.    Family History: Cancer family history and pertinent information reviewed below (Please see scanned pedigree for detailed family history information)    Sister, age 70, was diagnosed with lung cancer (lepidic adenocarcinoma) at age 69; no reported smoking history. She underwent genetic counseling and Carli reports that she also underwent genetic testing and no genetic risk was flagged, however the records Carli received from her sister didn't seem to contain the results from the testing itself.    Mother passed at age 102 with a history of breast cancer diagnosed at age 49/50. Carli reports that her mother underwent a hysterectomy for an unknown indication in her 40's (unknown status of ovaries).     Two maternal aunts with breast cancer history in their 80's.     Maternal grandmother with breast cancer history diagnosed after age 50.     Maternal first-cousin passed at age 70 from a melanoma of her sinus diagnosed at age 67. She did use occupational cleaning chemicals. This cousin has a son who passed at age 50 from colon cancer diagnosed at age 50.     Maternal first-cousin once removed (her maternal aunt Calos's granddaughter) passed at age 33 from a glioblastoma initially diagnosed at age 18.     Paternal uncle had a history of lung cancer in his 70's.     Paternal fist-cousin passed at age 37 from breast cancer diagnosed at age 35.     Her ethnicity is [place] and [place].  There is no known Ashkenazi Christianity ancestry on either side of her family. There is no reported consanguinity.    Discussion:    Carli's family history of cancer is suggestive of a hereditary cancer syndrome.    We reviewed the features of sporadic, familial, and hereditary cancers. In looking at Carli's family history, it is possible that a cancer susceptibility gene is present due to her mother's early-onset breast  cancer history, her two maternal aunt's breast cancer history, and her maternal grandmother's breast cancer history. Additionally, her paternal first-cousin passed from early-onset breast cancer.     We discussed the natural history and genetics of hereditary cancers. A detailed handout regarding the information we discussed will be sent to Carli via [employer] and in [place] mail. Topics included: inheritance pattern, cancer risks, cancer screening recommendations, and also risks, benefits and limitations of testing.  We reviewed that the most common cause of hereditary breast cancer is Hereditary Breast and Ovarian Cancer (HBOC) syndrome, which is caused by mutations in the genes BRCA1 and BRCA2. Women with a mutation in either of these genes are at increased risk for breast and ovarian cancer. There is also an increased risk for a second primary breast cancer for women. Men with a mutation in either BRCA1 or BRCA2 are at increased risk for male breast and prostate cancer. Both women and men may also be at increased risk for pancreatic cancer and melanoma.     Based on her family history, Carli meets current National Comprehensive Cancer Network (NCCN) criteria for genetic testing of high-penetrance breast cancer susceptibility genes (BRCA1, BRCA2, CDH1, PALB2, PTEN, and TP53).      We discussed that there are additional genes that could cause increased risk for breast and related cancers. As many of these genes present with overlapping features in a family and accurate cancer risk cannot always be established based upon the pedigree analysis alone, it would be reasonable for Carli to consider panel genetic testing to analyze multiple genes at once.    We reviewed genetic testing options given Carli's family history including targeted and expanded options. After our discussion, Carli opted to proceed with genetic testing via BRCA1/2 analysis with automatic reflex to the Common Hereditary Cancers panel through  [facility].   Genetic testing is available for 47 genes associated with cancers of the breast, ovary, uterus, prostate and gastrointestinal system: [facility] Common Hereditary Cancers panel (APC, JOHN, AXIN2, BARD1, BMPR1A, BRCA1, BRCA2, BRIP1, CDH1, CDK4, CDKN2A, CHEK2, CTNNA1, DICER1, EPCAM, GREM1, HOXB13, KIT, MEN1, MLH1, MSH2, MSH3, MSH6, MUTYH, NBN, NF1, NTHL1, PALB2, PDGFRA, PMS2, POLD1, POLE, PTEN,RAD50, RAD51C, RAD51D, SDHA, SDHB, SDHC, SDHD, SMAD4, SMARCA4, STK11, TP53,TSC1, TSC2, VHL).    We discussed that many of these genes are associated with specific hereditary cancer syndromes and published management guidelines: Hereditary Breast and Ovarian Cancer syndrome (BRCA1, BRCA2), Davalos syndrome (MLH1, MSH2, MSH6, PMS2, EPCAM), Familial Adenomatous Polyposis (APC), Hereditary Diffuse Gastric Cancer (CDH1), Familial Atypical Multiple Mole Melanoma syndrome (CDK4, CDKN2A), Multiple Endocrine Neoplasia type 1 (MEN1), Juvenile Polyposis syndrome (BMPR1A, SMAD4), Cowden syndrome (PTEN), Li Fraumeni syndrome (TP53), Hereditary Paraganglioma and Pheochromocytoma syndrome (SDHA, SDHB, SDHC, SDHD), Peutz-Jeghers syndrome (STK11), MUTYH Associated Polyposis (MUTYH), Tuberous sclerosis complex (TSC1, TSC2), Von Hippel-Lindau disease (VHL), and Neurofibromatosis type 1 (NF1).   The JOHN, AXIN2, BRIP1, CHEK2, GREM1, MSH3, NBN, NTHL1, PALB2, POLD1, POLE, RAD51C, and RAD51D genes are associated with increased cancer risk and have published management guidelines for certain cancers.   The remaining genes (BARD1, CTNNA1, DICER1, HOXB13, KIT, PDGFRA, RAD50, and SMARCA4) are associated with increased cancer risk and may allow us to make medical recommendations when mutations are identified.     Consent was obtained over video with no witness required due to the current covid19 global pandemic.    Medical Management: For Carli, we reviewed that the information from genetic testing may determine:    additional cancer screening for  which Carli may qualify (i.e. mammogram and breast MRI, more frequent colonoscopies, more frequent dermatologic exams, etc.),    options for risk reducing surgeries Carli could consider (i.e. bilateral mastectomy, surgery to remove her ovaries and/or uterus, etc.),      and targeted chemotherapies for Carli if she were to develop certain cancers in the future (i.e. immunotherapy for individuals with Davalos syndrome, PARP inhibitors, etc.).     These recommendations will be discussed in detail once genetic testing is completed.     Carli will schedule a lab only appointment at any [facility] clinic at her earliest convenience.     Plan:  1) Today Carli elected to proceed with BRCA1/2 analysis with automatic reflex to Common Hereditary Cancers panel genetic testing (47 genes) through [facility].  2) This information should be available in approximately 3 weeks.  3) Carli will be contacted by our scheduling department to set up a result disclosure appointment.     Jessica Hudson MS, CGC  Licensed, Certified Genetic Counselor  [facility]  [email or web address]                  Assessing Cancer Risk  Cancer is a common diagnosis which impacts many families.  Individuals may develop cancer due to environmental factors (such as exposures and lifestyle), aging, genetic predisposition, or a combination of these factors.      Only about 5-10% of cancers are thought to be due to an inherited cancer susceptibility gene.    These families often have:    Several people with the same or related types of cancer    Cancers diagnosed at a young age (before age 50)    Individuals with more than one primary cancer    Multiple generations of the family affected with cancer    Comprehensive Breast and Gynecologic Cancer Panel  We each inherit two copies of every gene in our bodies: one from our mother, and one from our father. Each gene has a specific job to do.  When a gene has a mistake or  mutation  in  it, it does not work like it should.     Some people may be candidates for genetic testing of more than one gene.  For these families, genetic testing using a cancer panel may be offered. These panels will test different genes at once known to increase the risk for breast, ovarian, uterine, and/or other cancers.    This handout will review common hereditary breast and gynecologic cancer syndromes. The genes that will be discussed in this handout are: JOHN, BRCA1, BRCA2, BRIP1, CDH1, CHEK2, MLH1, MSH2, MSH6, PMS2, EPCAM, PTEN, PALB2, RAD51C, RAD51D, and TP53.    The purpose of this handout is to serve as a brief summary of the breast and gynecologic cancer risk genes that have published clinical management guidelines for individuals who are found to carry a mutation. Inheriting a mutation does not mean a person will develop cancer, but it does significantly increase their risk above the general population risk.     ______________________________________________________________________________    Hereditary Breast and Ovarian Cancer Syndrome (BRCA1 and BRCA2)  A single mutation in one of the copies of BRCA1 or BRCA2 increases the risk for breast and ovarian cancer, among others.  The risk for pancreatic cancer and melanoma may also be slightly increased in some families.  The chart below shows the chance that someone with a BRCA mutation would develop cancer in his or her lifetime1,2,3,4.       Lifetime Cancer Risks    General Population BRCA1  BRCA2   Breast  12% >60% >60%   Ovarian  1-2% 39-58% 13-29%   Prostate 12% 7-26% 19-61%   Male Breast 0.1% 0.2-1.2% 1.8-7.1%   Pancreas 1-2% Up to 5% 5-10%     A person s ethnic background is also important to consider, as individuals of Ashkenazi Baptism ancestry have a higher chance of having a BRCA gene mutation.  There are three BRCA mutations that occur more frequently in this population.      Davalos Syndrome (MLH1, MSH2, MSH6, PMS2, and EPCAM)  Currently five genes are known  to cause Davalos Syndrome: MLH1, MSH2, MSH6, PMS2, and EPCAM.  A single mutation in one of the Davalos Syndrome genes increases the risk for colon, endometrial, ovarian, and stomach cancers.  Other cancers that occur less commonly in Davalos Syndrome include urinary tract, skin, and brain cancers.  The chart below shows the chance that a person with Davalos syndrome would develop cancer in his or her lifetime5.      Lifetime Cancer Risks    General Population Davalos Syndrome   Colon 5% 10-61%   Endometrial 3% 13-57%   Ovarian 1-2% 1-38%   Stomach <1% 1-9%   *Cancer risk varies depending on Davalos syndrome gene found      Cowden Syndrome (PTEN)  Cowden syndrome is a hereditary condition that increases the risk for breast, thyroid, endometrial, colon, and kidney cancer.  Cowden syndrome is caused by a mutation in the PTEN gene.  A single mutation in one of the copies of PTEN causes Cowden syndrome and increases cancer risk.  The chart below shows the chance that someone with a PTEN mutation would develop cancer in their lifetime6,7.  Other benign features seen in some individuals with Cowden syndrome include benign skin lesions (facial papules, keratoses, lipomas), learning disability, autism, thyroid nodules, colon polyps, and larger head size.     Lifetime Cancer Risks    General Population Cowden   Breast 12% 40-60%*   Thyroid 1% Up to 38%   Renal 1-2% Up to 35%   Endometrial 3% Up to 28%   Colon 5% Up to 9%   Melanoma 2-3% Up to 6%   *Emerging data suggests the risk for breast cancer could be greater than 60%               Li-Fraumeni Syndrome (TP53)  Li-Fraumeni Syndrome (LFS) is a cancer predisposition syndrome caused by a mutation in the TP53 gene. A single mutation in one of the copies of TP53 increases the risk for multiple cancers. Individuals with LFS are at an increased risk for developing cancer at a young age. The lifetime risk for development of a LFS-associated cancer is 50% by age 30 and 90% by age 60.     Core  Cancers: Sarcomas, Breast, Brain, Lung, Leukemias/Lymphomas, Adrenocortical carcinomas    Other Cancers: Gastrointestinal, Thyroid, Skin, Genitourinary       Hereditary Diffuse Gastric Cancer (CDH1)  Currently, one gene is known to cause hereditary diffuse gastric cancer (HDGC): CDH1.  Individuals with HDGC are at increased risk for diffuse gastric cancer and lobular breast cancer. Of people diagnosed with HDGC, 30-50% have a mutation in the CDH1 gene.  This suggests there are likely other genes that may cause HDGC that have not been identified yet.      Lifetime Cancer Risks    General Population HDGC   Diffuse Gastric  <1% ~80%   Breast 12% 41-60%       Additional Genes    JOHN  JOHN is a moderate-risk breast cancer gene. Women who have a mutation in JOHN can have between a 2-4 fold increased risk for breast cancer compared to the general population8. JOHN mutations have also been associated with increased risk for pancreatic cancer between 5-10%9. Individuals who inherit two JOHN mutations have a condition called ataxia-telangiectasia (AT).  This rare autosomal recessive condition affects the nervous system and immune system, and is associated with progressive cerebellar ataxia beginning in childhood. Individuals with ataxia-telangiectasia often have a weakened immune system and have an increased risk for childhood cancers.    PALB2  Mutations in PALB2 have been shown to increase the risk of breast cancer up to 41-60% in some families; where individuals fall within this risk range is dependent upon family [id number]. PALB2 mutations have also been associated with increased risk for pancreatic cancer between 5-10%.  Individuals who inherit two PALB2 mutations--one from their mother and one from their father--have a condition called Fanconi Anemia.  This rare autosomal recessive condition is associated with short stature, developmental delay, bone marrow failure, and increased risk for childhood cancers.    CHEK2   CHEK2  is a moderate-risk breast cancer gene.  Women who have a mutation in CHEK2 have around a 2-4 fold increased risk for breast cancer compared to the general population, and this risk may be higher depending upon family history.11,12,13 The risk of colon cancer may be twice as high as the general population risk of colon cancer of 5%. Mutations in CHEK2 have also been shown to increase the risk of other cancers, including prostate, however these cancer risks are currently not well understood.    BRIP1, RAD51C and RAD51D  Mutations in RAD51C and RAD51D have been shown to increase the risk of ovarian cancer and breast cancer 14,. Mutations in BRIP1 have been shown to increase the risk of ovarian cancer and possibly female breast cancer 15 .       Lifetime Cancer Risk    General Population        BRIP1   RAD51C  RAD51D   Breast 12% Not well defined 20-40% 20-40%   Ovarian 1-2% 5-15% 10-15% 10-20%     ______________________________________________________________  Inheritance  All of the cancer syndromes reviewed above are inherited in an autosomal dominant pattern.  This means that if a parent has a mutation, each of their children will have a 50% chance of inheriting that same mutation. Therefore, each child --male or female-- would have a 50% chance of being at increased risk for developing cancer.    Image obtained from Genetics Home Reference, 2013     Mutations in some genes can occur de carolynn, which means that a person s mutation occurred for the first time in them and was not inherited from a parent.  Now that they have the mutation, however, it can be passed on to future generations.    Genetic Testing  Genetic testing involves a blood test and will look for any harmful mutations that are associated with increased cancer risk.  If possible, it is recommended that the person(s) who has had cancer be tested before other family members.  That person will give us the most useful information about whether or not a  specific gene is associated with the cancer in the family.    Results  There are three possible results of genetic testing:    Positive--a harmful mutation was identified in one or more of the genes    Negative--no mutations were identified in any of the genes tested    Variant of unknown significance--a variation in one of the genes was identified, but it is unclear how this impacts cancer risk in the family    Advantages and Disadvantages   There are advantages and disadvantages to genetic testing.    Advantages    May clarify your cancer risk    Can help you make medical decisions    May explain the cancers in your family    May give useful information to your family members (if you share your results)    Disadvantages    Possible negative emotional impact of learning about inherited cancer risk    Uncertainty in interpreting a negative test result in some situations    Possible genetic discrimination concerns (see below)    Genetic Information Nondiscrimination Act (NERISSA)  The Genetic Information Nondiscrimination Act of 2008 (NERISSA) is a federal law that protects individuals from health insurance or employment discrimination based on a genetic test result alone (with some exceptions, including employers with fewer than 15 employees, and ).  Although rare, NERISSA  does not cover discrimination protections in terms of life insurance, long term care, or disability insurances.  Visit the National Human [employer] Research [place] website to learn more.    Reducing Cancer Risk  All of the genes described in this handout have nationally recognized cancer screening guidelines that would be recommended for individuals who test positive.  In addition to increased cancer screening, surgeries may be offered or recommended to reduce cancer risk.  Recommendations are based upon an individual s genetic test result as well as their personal and family history of cancer.    Questions to Think About Regarding Genetic  Testing:    What effect will the test result have on me and my relationship with my family members if I have an inherited gene mutation?  If I don t have a gene mutation?    Should I share my test results, and how will my family react to this news, which may also affect them?    Are my children ready to learn new information that may one day affect their own health?    Hereditary Cancer Resources    FORCE: Facing Our Risk of Cancer Empowered facingourrisk.org   Bright Pink bebrightpink.org   Li-Fraumeni Syndrome Association lfsassociation.org   PTEN World PTENworld.[employer]   No stomach for cancer, Inc. nostomachforcancer.org   Stomach cancer relief network Scrnet.org   Collaborative Group of the Americas on Inherited Colorectal Cancer (CGA) cgaicc.com    Cancer Care cancercare.org   American Cancer Society (ACS) cancer.org   National Cancer [place] (NCI) cancer.gov     Please call us if you have any questions or concerns.   Cancer Risk Management Program [phone] ([phone])      References  1. Flavia Mack PDP, Trina S, Akshat HERNANDEZ, Vu JE, Sid JL, Heriberto N, Malgorzata H, Francis O, Juan M A, Adinini B, João P, Manethan S, Rose DM, Gama N, Laure E, Shruti H, Tim E, Gasper J, Mitul J, Rocio B, Tulinius H, Thorlacius S, Eerola H, Kavyana H, Kenyon K, Jun OP. Average risks of breast and ovarian cancer associated with BRCA1 or BRCA2 mutations detected in case series unselected for family history: a combined analysis of 222 studies. Am J Hum Sofi. 2003;72:1117-30.  2. Tosha N, Faye M, De La Rosa G.  BRCA1 and BRCA2 Hereditary Breast and Ovarian Cancer. Gene Reviews online. 2013.  3. Jonathan YC, Jennifer S, Allegra G, Hudson S. Breast cancer risk among male BRCA1 and BRCA2 mutation carriers. J Natl Cancer Inst. 2007;99:1811-4.  4. Jin DG, Esha I, Gabe J, Roxanna E, Best ER, Lalloo F. Risk of breast cancer in male BRCA2 carriers. J Med Sofi.  2010;47:710-1.  5. National Comprehensive Cancer Network. Clinical practice guidelines in oncology, colorectal cancer screening. Available online (registration required). 2015.  6. Dev MH, Adriane J, Magdy J, Sherly LA, Malia MS, Luis Felipe C. Lifetime cancer risks in individuals with germline PTEN mutations. Clin Cancer Res. 2012;18:400-7.  7. Linda R. Cowden Syndrome: A Critical Review of the Clinical Literature. J Sofi . 2009:18:13-27.  8. Anastacio A, Peter D, Juan Antonio S, Skyla P, Joleen T, Lucila M, Natan B, Bonita H, Ap R, Manny K, Rodney L, Jin DG, Rose D, Hermelindo DF, Quique MR, The Breast Cancer Susceptibility Collaboration (UK) & Delfina N. JOHN mutations that cause ataxia-telangiectasia are breast cancer susceptibility alleles. Nature Genetics. 2006;38:873-875  9. Clyde N , Ed Y, Whitney J, Jose C L, Cheko GM , Yunior ML, Gallinger S, Cristobal AG, Syngal S, Kaycee ML, Venkat J , Ary R, Archana SZ, Eskirill JR, Wally VE, Sidra M, Vogelstein B, Miki N, Gena RH, Katelyn KW, and Gaviria AP. JOHN mutations in patients with hereditary pancreatic cancer. Cancer Discover. 2012;2:41-46  10. Enzo THOMPSON, et al. Breast-Cancer Risk in Families with Mutations in PALB2. NEJM. 2014; 371(6):497-506.  11. CHEK2 Breast Cancer Case-Control Consortium. CHEK2*1100delC and susceptibility to breast cancer: A collaborative analysis involving 10,860 breast cancer cases and 9,065 controls from 10 studies. Am J Hum Sofi, 74 (2004), pp. 1783-1815  12. Salvatore T, Naif S, Destiny K, et al. Spectrum of Mutations in BRCA1, BRCA2, CHEK2, and TP53 in Families at High Risk of Breast Cancer. YESSICA. 2006;295(12):1352-2679.   13. Sandra SETPHENS, Melly HARRIS, Arleth PEREZ, et al. Risk of breast cancer in women with a CHEK2 mutation with and without a family history of breast cancer. J Clin Oncol. 2011;29:1149-7710.  14. Corby BUSBY, Yunior E, Valerio MARSH, et al. Contribution of germline mutations in the RAD51B,  RAD51C, and RAD51D genes to ovarian cancer in the population. J Clin Oncol. 2015;33(26):9246-4381. Doi:10.1200/JCO.2015.61.2408.  15. Jay Jay T, Callie TRACY, Harman P, et al. Mutations in BRIP1 confer high risk of ovarian cancer. Alia Sofi. 2011;43(11):2947-2947. doi:10.1038/ng.955.

## 2023-03-08 NOTE — PATIENT INSTRUCTIONS
Assessing Cancer Risk  Cancer is a common diagnosis which impacts many families.  Individuals may develop cancer due to environmental factors (such as exposures and lifestyle), aging, genetic predisposition, or a combination of these factors.      Only about 5-10% of cancers are thought to be due to an inherited cancer susceptibility gene.    These families often have:  Several people with the same or related types of cancer  Cancers diagnosed at a young age (before age 50)  Individuals with more than one primary cancer  Multiple generations of the family affected with cancer    Comprehensive Breast and Gynecologic Cancer Panel  We each inherit two copies of every gene in our bodies: one from our mother, and one from our father. Each gene has a specific job to do.  When a gene has a mistake or  mutation  in it, it does not work like it should.     Some people may be candidates for genetic testing of more than one gene.  For these families, genetic testing using a cancer panel may be offered. These panels will test different genes at once known to increase the risk for breast, ovarian, uterine, and/or other cancers.    This handout will review common hereditary breast and gynecologic cancer syndromes. The genes that will be discussed in this handout are: JOHN, BRCA1, BRCA2, BRIP1, CDH1, CHEK2, MLH1, MSH2, MSH6, PMS2, EPCAM, PTEN, PALB2, RAD51C, RAD51D, and TP53.    The purpose of this handout is to serve as a brief summary of the breast and gynecologic cancer risk genes that have published clinical management guidelines for individuals who are found to carry a mutation. Inheriting a mutation does not mean a person will develop cancer, but it does significantly increase their risk above the general population risk.     ______________________________________________________________________________    Hereditary Breast and Ovarian Cancer Syndrome (BRCA1 and BRCA2)  A single mutation in one of the copies of BRCA1 or  BRCA2 increases the risk for breast and ovarian cancer, among others.  The risk for pancreatic cancer and melanoma may also be slightly increased in some families.  The chart below shows the chance that someone with a BRCA mutation would develop cancer in his or her lifetime1,2,3,4.       Lifetime Cancer Risks    General Population BRCA1  BRCA2   Breast  12% >60% >60%   Ovarian  1-2% 39-58% 13-29%   Prostate 12% 7-26% 19-61%   Male Breast 0.1% 0.2-1.2% 1.8-7.1%   Pancreas 1-2% Up to 5% 5-10%     A person s ethnic background is also important to consider, as individuals of Ashkenazi Bahai ancestry have a higher chance of having a BRCA gene mutation.  There are three BRCA mutations that occur more frequently in this population.      Davalos Syndrome (MLH1, MSH2, MSH6, PMS2, and EPCAM)  Currently five genes are known to cause Davalos Syndrome: MLH1, MSH2, MSH6, PMS2, and EPCAM.  A single mutation in one of the Davalos Syndrome genes increases the risk for colon, endometrial, ovarian, and stomach cancers.  Other cancers that occur less commonly in Davalos Syndrome include urinary tract, skin, and brain cancers.  The chart below shows the chance that a person with Davalos syndrome would develop cancer in his or her lifetime5.      Lifetime Cancer Risks    General Population Davalos Syndrome   Colon 5% 10-61%   Endometrial 3% 13-57%   Ovarian 1-2% 1-38%   Stomach <1% 1-9%   *Cancer risk varies depending on Davalos syndrome gene found      Cowden Syndrome (PTEN)  Cowden syndrome is a hereditary condition that increases the risk for breast, thyroid, endometrial, colon, and kidney cancer.  Cowden syndrome is caused by a mutation in the PTEN gene.  A single mutation in one of the copies of PTEN causes Cowden syndrome and increases cancer risk.  The chart below shows the chance that someone with a PTEN mutation would develop cancer in their lifetime6,7.  Other benign features seen in some individuals with Cowden syndrome include benign  skin lesions (facial papules, keratoses, lipomas), learning disability, autism, thyroid nodules, colon polyps, and larger head size.     Lifetime Cancer Risks    General Population Cowden   Breast 12% 40-60%*   Thyroid 1% Up to 38%   Renal 1-2% Up to 35%   Endometrial 3% Up to 28%   Colon 5% Up to 9%   Melanoma 2-3% Up to 6%   *Emerging data suggests the risk for breast cancer could be greater than 60%               Li-Fraumeni Syndrome (TP53)  Li-Fraumeni Syndrome (LFS) is a cancer predisposition syndrome caused by a mutation in the TP53 gene. A single mutation in one of the copies of TP53 increases the risk for multiple cancers. Individuals with LFS are at an increased risk for developing cancer at a young age. The lifetime risk for development of a LFS-associated cancer is 50% by age 30 and 90% by age 60.   Core Cancers: Sarcomas, Breast, Brain, Lung, Leukemias/Lymphomas, Adrenocortical carcinomas  Other Cancers: Gastrointestinal, Thyroid, Skin, Genitourinary       Hereditary Diffuse Gastric Cancer (CDH1)  Currently, one gene is known to cause hereditary diffuse gastric cancer (HDGC): CDH1.  Individuals with HDGC are at increased risk for diffuse gastric cancer and lobular breast cancer. Of people diagnosed with HDGC, 30-50% have a mutation in the CDH1 gene.  This suggests there are likely other genes that may cause HDGC that have not been identified yet.      Lifetime Cancer Risks    General Population HDGC   Diffuse Gastric  <1% ~80%   Breast 12% 41-60%       Additional Genes    JOHN  JOHN is a moderate-risk breast cancer gene. Women who have a mutation in JOHN can have between a 2-4 fold increased risk for breast cancer compared to the general population8. JOHN mutations have also been associated with increased risk for pancreatic cancer between 5-10%9. Individuals who inherit two JOHN mutations have a condition called ataxia-telangiectasia (AT).  This rare autosomal recessive condition affects the nervous system  and immune system, and is associated with progressive cerebellar ataxia beginning in childhood. Individuals with ataxia-telangiectasia often have a weakened immune system and have an increased risk for childhood cancers.    PALB2  Mutations in PALB2 have been shown to increase the risk of breast cancer up to 41-60% in some families; where individuals fall within this risk range is dependent upon family [id number]. PALB2 mutations have also been associated with increased risk for pancreatic cancer between 5-10%.  Individuals who inherit two PALB2 mutations--one from their mother and one from their father--have a condition called Fanconi Anemia.  This rare autosomal recessive condition is associated with short stature, developmental delay, bone marrow failure, and increased risk for childhood cancers.    CHEK2   CHEK2 is a moderate-risk breast cancer gene.  Women who have a mutation in CHEK2 have around a 2-4 fold increased risk for breast cancer compared to the general population, and this risk may be higher depending upon family history.11,12,13 The risk of colon cancer may be twice as high as the general population risk of colon cancer of 5%. Mutations in CHEK2 have also been shown to increase the risk of other cancers, including prostate, however these cancer risks are currently not well understood.    BRIP1, RAD51C and RAD51D  Mutations in RAD51C and RAD51D have been shown to increase the risk of ovarian cancer and breast cancer 14,. Mutations in BRIP1 have been shown to increase the risk of ovarian cancer and possibly female breast cancer 15 .       Lifetime Cancer Risk    General Population        BRIP1   RAD51C  RAD51D   Breast 12% Not well defined 20-40% 20-40%   Ovarian 1-2% 5-15% 10-15% 10-20%     ______________________________________________________________  Inheritance  All of the cancer syndromes reviewed above are inherited in an autosomal dominant pattern.  This means that if a parent has a mutation,  each of their children will have a 50% chance of inheriting that same mutation. Therefore, each child --male or female-- would have a 50% chance of being at increased risk for developing cancer.    Image obtained from Genetics Home Reference, 2013     Mutations in some genes can occur de carolynn, which means that a person s mutation occurred for the first time in them and was not inherited from a parent.  Now that they have the mutation, however, it can be passed on to future generations.    Genetic Testing  Genetic testing involves a blood test and will look for any harmful mutations that are associated with increased cancer risk.  If possible, it is recommended that the person(s) who has had cancer be tested before other family members.  That person will give us the most useful information about whether or not a specific gene is associated with the cancer in the family.    Results  There are three possible results of genetic testing:  Positive--a harmful mutation was identified in one or more of the genes  Negative--no mutations were identified in any of the genes tested  Variant of unknown significance--a variation in one of the genes was identified, but it is unclear how this impacts cancer risk in the family    Advantages and Disadvantages   There are advantages and disadvantages to genetic testing.    Advantages  May clarify your cancer risk  Can help you make medical decisions  May explain the cancers in your family  May give useful information to your family members (if you share your results)    Disadvantages  Possible negative emotional impact of learning about inherited cancer risk  Uncertainty in interpreting a negative test result in some situations  Possible genetic discrimination concerns (see below)    Genetic Information Nondiscrimination Act (NERISSA)  The Genetic Information Nondiscrimination Act of 2008 (NERISSA) is a federal law that protects individuals from health insurance or employment discrimination  based on a genetic test result alone (with some exceptions, including employers with fewer than 15 employees, and ).  Although rare, NERISSA  does not cover discrimination protections in terms of life insurance, long term care, or disability insurances.  Visit the [employer] website to learn more.    Reducing Cancer Risk  All of the genes described in this handout have nationally recognized cancer screening guidelines that would be recommended for individuals who test positive.  In addition to increased cancer screening, surgeries may be offered or recommended to reduce cancer risk.  Recommendations are based upon an individual s genetic test result as well as their personal and family history of cancer.    Questions to Think About Regarding Genetic Testing:  What effect will the test result have on me and my relationship with my family members if I have an inherited gene mutation?  If I don t have a gene mutation?  Should I share my test results, and how will my family react to this news, which may also affect them?  Are my children ready to learn new information that may one day affect their own health?    Hereditary Cancer Resources    FORCE: Facing Our Risk of Cancer Empowered [email or web address]   Bright Pink bebrightpink.org   Li-Fraumeni Syndrome Association lfsassociation.org   PTEN World PTENworld.com   No stomach for cancer, Inc. nostomachforcancer.org   Stomach cancer relief network Scrnet.org   Collaborative Group of the Americas on Inherited Colorectal Cancer (CGA) cgaicc.com    Cancer Care cancercare.org   American Cancer Society (ACS) cancer.org   National Cancer Par[place]tt (NCI) cancer.gov     Please call us if you have any questions or concerns.   Cancer Risk Management Program [phone] ([phone])      References  Flavia Mack PDP, Trina S, Akshat HERNANDEZ, Vu ANTHONY, Sid MORALES, Heriberto N, Malgorzata H, Francis O, Juan M A, Chase B, João P, Aimee S,  Rose DM, Gama N, Laure E, Shruti H, Tim E, Gasper J, Mitul J, Rocio B, Donny H, Meleci S, Eecarlotaa H, Izaiah H, Kenyon K, Jun OP. Average risks of breast and ovarian cancer associated with BRCA1 or BRCA2 mutations detected in case series unselected for family history: a combined analysis of 222 studies. Am J Hum Sofi. 2003;72:1117-30.  Tosha N, Faye MAYBERRY, Estrella G.  BRCA1 and BRCA2 Hereditary Breast and Ovarian Cancer. Gene Reviews online. 2013.  Jonathan YC, Jennifer S, Allegra G, Hudson S. Breast cancer risk among male BRCA1 and BRCA2 mutation carriers. J Natl Cancer Inst. 2007;99:1811-4.  Jin EVERETT, Esha I, Gabe J, Roxanna E, Best ER, Adry F. Risk of breast cancer in male BRCA2 carriers. J Med Sofi. 2010;47:710-1.  National Comprehensive Cancer Network. Clinical practice guidelines in oncology, colorectal cancer screening. Available online (registration required). 2015.  Méndez MH, Adriane J, Magdy J, Sherly LA, Orlodominic MS, Eng C. Lifetime cancer risks in individuals with germline PTEN mutations. Clin Cancer Res. 2012;18:400-7.  Linda SOLANO. Cowden Syndrome: A Critical Review of the Clinical Literature. J Sofi . 2009:18:13-27.  Anastacio A, Peter D, Juan Antonio S, Skyla P, Chagtai T, Lucila M, Natan B, Bonita H, Ap R, Manny K, Rodney L, Jin EVERETT, Rose HARRIS, Hermelindo DF, Quique MR, The Breast Cancer Susceptibility Collaboration (UK) & Delfina N. JOHN mutations that cause ataxia-telangiectasia are breast cancer susceptibility alleles. Nature Genetics. 2006;38:873-875  Clyde MEIER , Ed Y, Whitney NOVA, Jose C VALDEZ, Cheko GM , Yunior ML, Vaibhavinger S, Cristobal AG, Syngal S, Kaycee ML, Venkat J , Ary R, Archana SZ, Lit JR, Wally VE, Sidra M, Vogelstein B, Miki N, Gena RH, Katelyn KW, and Everette AP. JOHN mutations in patients with hereditary pancreatic cancer. Cancer Discover. 2012;2:41-46  Enzo THOMPSON et al. Breast-Cancer Risk in Families with  Mutations in PALB2. NEJM. 2014; 371(6):497-506.  CHEK2 Breast Cancer Case-Control Consortium. CHEK2*1100delC and susceptibility to breast cancer: A collaborative analysis involving 10,860 breast cancer cases and 9,065 controls from 10 studies. Am J Hum Sofi, 74 (2004), pp. 4616-6915  Salvatore T, Naif S, Destiny K, et al. Spectrum of Mutations in BRCA1, BRCA2, CHEK2, and TP53 in Families at High Risk of Breast Cancer. YESISCA. 2006;295(12):4852-0530.   Sandra C, Melly D, Arleth A, et al. Risk of breast cancer in women with a CHEK2 mutation with and without a family history of breast cancer. J Clin Oncol. 2011;29:2252-0633.  Corby H, Yunior E, Valerio SJ, et al. Contribution of germline mutations in the RAD51B, RAD51C, and RAD51D genes to ovarian cancer in the population. J Clin Oncol. 2015;33(26):0068-3987. Doi:10.1200/JCO.2015.61.2408.  Jay Jay T, Callie DF, Harman P, et al. Mutations in BRIP1 confer high risk of ovarian cancer. Alia Sofi. 2011;43(11):7224-0420. doi:10.1038/ng.955.

## 2023-03-14 ENCOUNTER — LAB (OUTPATIENT)
Dept: LAB | Facility: CLINIC | Age: 73
End: 2023-03-14
Payer: MEDICARE

## 2023-03-14 DIAGNOSIS — Z80.0 FAMILY HISTORY OF COLON CANCER: ICD-10-CM

## 2023-03-14 DIAGNOSIS — Z80.8 FAMILY HISTORY OF GLIOBLASTOMA: ICD-10-CM

## 2023-03-14 DIAGNOSIS — Z80.8 FAMILY HISTORY OF MALIGNANT MELANOMA: ICD-10-CM

## 2023-03-14 DIAGNOSIS — Z80.3 FAMILY HISTORY OF MALIGNANT NEOPLASM OF BREAST: ICD-10-CM

## 2023-03-14 PROCEDURE — 36415 COLL VENOUS BLD VENIPUNCTURE: CPT

## 2023-03-14 PROCEDURE — 99000 SPECIMEN HANDLING OFFICE-LAB: CPT

## 2023-03-24 ENCOUNTER — THERAPY VISIT (OUTPATIENT)
Dept: PHYSICAL THERAPY | Facility: CLINIC | Age: 73
End: 2023-03-24
Payer: MEDICARE

## 2023-03-24 DIAGNOSIS — S72.001A CLOSED FRACTURE OF NECK OF RIGHT FEMUR, INITIAL ENCOUNTER (H): Primary | ICD-10-CM

## 2023-03-24 PROCEDURE — 97110 THERAPEUTIC EXERCISES: CPT | Mod: GP | Performed by: PHYSICAL THERAPIST

## 2023-03-24 NOTE — PROGRESS NOTES
[facility]    OUTPATIENT Physical Therapy ORTHOPEDIC EVALUATION  PLAN OF TREATMENT FOR OUTPATIENT REHABILITATION  (COMPLETE FOR INITIAL CLAIMS ONLY)  Patient's Last Name, First Name, M.I.  YOB: 1950  Carli Michelle    Provider s Name:  [facility]   Medical Record No.  [id number]   Start of Care Date:  12/21/22   Onset Date:  11/01/2210/31/22   Treatment Diagnosis:  R hip fracture Medical Diagnosis:  Closed fracture of neck of right femur, initial encounter (H)       Goals:     03/24/23 0500   Body Part   Goals listed below are for R hip fracture   Goal #1   Goal #1 standing   Current Functional Level Minutes patient can stand   Performance level 5 minutes on RLE without difficulty and P   STG Target Performance Minutes patient will be able to stand   Performance level 20 min without increased P or difficulty on RLE   Rationale for housekeeping tasks such as vacuuming, bed making, mowing, gardening;for personal hygiene;for meal preparation;for safe household ambulation;for safe community ambulation   Due date 02/04/23   Date Goal Met 01/23/23   LTG Target Performance Minutes patient will be able to stand   Performance Level 60 minutes on RLE without increased symptoms or difficulty   Rationale for housekeeping tasks such as vacuuming, bed making, mowing;for personal hygiene;for meal preparation;for safe household ambulation;for safe community ambulation   Due date 05/23/23   If goal not met, Why? Still very difficult for longer periods of time or with her exercise.         Therapy Frequency:  1x/week  Predicted Duration of Therapy Intervention:  10 weeks    JEN CROSS, PT                 I CERTIFY THE NEED FOR THESE SERVICES FURNISHED UNDER        THIS PLAN OF TREATMENT AND WHILE UNDER MY CARE     (Physician attestation of this document indicates review and  certification of the therapy plan).                     Certification Date From:  03/22/23   Certification Date To:  06/20/23    Referring Provider:  Houston Menchaca    Initial Assessment        See Epic Evaluation SOC Date: 12/21/22

## 2023-03-27 LAB — SCANNED LAB RESULT: NORMAL

## 2023-04-19 ENCOUNTER — THERAPY VISIT (OUTPATIENT)
Dept: PHYSICAL THERAPY | Facility: CLINIC | Age: 73
End: 2023-04-19
Payer: MEDICARE

## 2023-04-19 DIAGNOSIS — S72.001A CLOSED FRACTURE OF NECK OF RIGHT FEMUR, INITIAL ENCOUNTER (H): Primary | ICD-10-CM

## 2023-04-19 PROCEDURE — 97110 THERAPEUTIC EXERCISES: CPT | Mod: GP | Performed by: PHYSICAL THERAPIST

## 2023-04-19 PROCEDURE — 97140 MANUAL THERAPY 1/> REGIONS: CPT | Mod: GP | Performed by: PHYSICAL THERAPIST

## 2023-04-19 NOTE — PROGRESS NOTES
"Virtual Visit Details  Type of service:  Video Visit   Video Start Time:  10:07am  Video End Time:10:26am  Originating Location (pt. Location): Home  Distant Location (provider location):  Off-site  Platform used for Video Visit: Alayna    4/20/2023    Referring Provider: Houston Menchaca MD    Presenting Information:  I spoke to Carli by video today to discuss her genetic testing results. Her blood was drawn on 3/14/2023. Common Hereditary Cancers panel genetic testing was ordered from [facility]. This testing was done because of Carli's family history of breast cancer.    Genetic Testing Result: NEGATIVE  Carli is negative for mutations in APC, JOHN, AXIN2, BARD1, BMPR1A, BRCA1, BRCA2, BRIP1, CDH1, CDK4, CDKN2A, CHEK2, CTNNA1, DICER1, EPCAM, GREM1, HOXB13, KIT, MEN1, MLH1, MSH2, MSH3, MSH6, MUTYH, NBN, NF1, NTHL1, PALB2, PDGFRA, PMS2, POLD1, POLE, PTEN, RAD50, RAD51C, RAD51D, SDHA, SDHB, SDHC, SDHD, SMAD4, SMARCA4, STK11, TP53, TSC1, TSC2, and VHL. No mutations were found in any of the 47 genes analyzed. Please see copy of scanned test report for complete details regarding testing methodology/limitations.    A copy of the test report can be found in the Laboratory tab, dated 3/14/2023, and named \"LABORATORY MISCELLANEOUS ORDER\". The report is scanned in as a linked document.    Interpretation:  We discussed several different interpretations of this negative test result.    1. One explanation may be that there is a different gene or combination of genes and environment that are associated with the cancers in this family.  2. It is possible that her other relatives with cancer history did have a mutation in one of the above genes, and she did not inherit it.  3. There is also a small possibility that there is a mutation in one of these genes, and the testing laboratory could not find it with their current testing methods.       Screening:  Based on this negative test result, it is important for Carli and her " relatives to refer back to the family history for appropriate cancer screening.      Based on her personal and family history, Carli has a 11.8% lifetime risk of developing breast cancer based on the LALITHA (v8) model. Therefore, Carli does not meet current National Comprehensive Cancer Network (NCCN) guidelines for high risk breast screening, which is offered to women with a 20% lifetime risk or higher. However, it is still important for Carli to continue with routine breast screening under the care of her physicians. Breast cancer screening is generally recommended to begin approximately 10 years younger than the earliest age of breast cancer diagnosis in the family, or at age 40, whichever comes first. Carli is encouraged to discuss breast screening with her physicians.     Other population cancer screening options, such as those recommended by the American Cancer Society and the National Comprehensive Cancer Network (NCCN), are also appropriate for Carli and her family. These screening recommendations may change if there are changes to Carli's personal and/or family history. Final screening recommendations should be made by each individual's managing physician.        Additional Testing Considerations:  Although Carli's genetic testing result was negative, other relatives may still carry a gene mutation associated with their personal/family history of cancer. Genetic counseling is recommended for her sister and maternal and paternal relatives to discuss genetic testing options. If any of these relatives do pursue genetic testing, Carli is encouraged to contact me so that we may review the impact of their test results on her.    Summary:  We do not have an explanation for Carli's family history of cancer. While no genetic changes were identified, Carli may still be at risk for certain cancers due to family history, environmental factors, or other genetic causes not identified by this test.  Because of  that, it is important that she continue with cancer screening based on her personal and family history as discussed above.    Genetic testing is rapidly advancing, and new cancer susceptibility genes will most likely be identified in the future.  Therefore, I encouraged Carli to contact me annually or if there are changes in her personal or family history.  This may change how we assess her cancer risk, screening, and the testing we would offer.    Plan:  1. A copy of the test results will be mailed to Carli.  2. She plans to follow-up with her primary care providers for routine care and cancer screenings.  3. She should contact me regularly, or sooner if her family history changes.    Jessica Hudson MS, [facility]  Licensed, Certified Genetic Counselor  [facility]  [email or web address]

## 2023-04-20 ENCOUNTER — VIRTUAL VISIT (OUTPATIENT)
Dept: ONCOLOGY | Facility: CLINIC | Age: 73
End: 2023-04-20
Attending: GENETIC COUNSELOR, MS
Payer: MEDICARE

## 2023-04-20 DIAGNOSIS — Z80.3 FAMILY HISTORY OF MALIGNANT NEOPLASM OF BREAST: ICD-10-CM

## 2023-04-20 DIAGNOSIS — Z80.8 FAMILY HISTORY OF GLIOBLASTOMA: ICD-10-CM

## 2023-04-20 DIAGNOSIS — Z80.9 FAMILY HISTORY OF CANCER IN SISTER: Primary | ICD-10-CM

## 2023-04-20 DIAGNOSIS — Z80.0 FAMILY HISTORY OF COLON CANCER: ICD-10-CM

## 2023-04-20 DIAGNOSIS — Z80.8 FAMILY HISTORY OF MALIGNANT MELANOMA: ICD-10-CM

## 2023-04-20 PROCEDURE — 999N000069 HC STATISTIC GENETIC COUNSELING, < 16 MIN: Mod: GT | Performed by: GENETIC COUNSELOR, MS

## 2023-04-20 NOTE — LETTER
"    4/20/2023         RE: Carli Michelle  [address]  [facility] [address]        Dear Colleague,    Thank you for referring your patient, Carli Michelle, to the [facility]. Please see a copy of my visit note below.    Virtual Visit Details  Type of service:  Video Visit   Video Start Time:  10:07am  Video End Time:10:26am  Originating Location (pt. Location): Home  Distant Location (provider location):  Off-site  Platform used for Video Visit: [facility]    4/20/2023    Referring Provider: Houston Menchaca MD    Presenting Information:  I spoke to Carli by video today to discuss her genetic testing results. Her blood was drawn on 3/14/2023. Common Hereditary Cancers panel genetic testing was ordered from [facility]. This testing was done because of Carli's family history of breast cancer.    Genetic Testing Result: NEGATIVE  Carli is negative for mutations in APC, JOHN, AXIN2, BARD1, BMPR1A, BRCA1, BRCA2, BRIP1, CDH1, CDK4, CDKN2A, CHEK2, CTNNA1, DICER1, EPCAM, GREM1, HOXB13, KIT, MEN1, MLH1, MSH2, MSH3, MSH6, MUTYH, NBN, NF1, NTHL1, PALB2, PDGFRA, PMS2, POLD1, POLE, PTEN, RAD50, RAD51C, RAD51D, SDHA, SDHB, SDHC, SDHD, SMAD4, SMARCA4, STK11, TP53, TSC1, TSC2, and VHL. No mutations were found in any of the 47 genes analyzed. Please see copy of scanned test report for complete details regarding testing methodology/limitations.    A copy of the test report can be found in the Laboratory tab, dated 3/14/2023, and named \"LABORATORY MISCELLANEOUS ORDER\". The report is scanned in as a linked document.    Interpretation:  We discussed several different interpretations of this negative test result.    One explanation may be that there is a different gene or combination of genes and environment that are associated with the cancers in this family.  It is possible that her other relatives with cancer history did have a mutation in one of the above genes, and she did not inherit " it.  There is also a small possibility that there is a mutation in one of these genes, and the testing laboratory could not find it with their current testing methods.       Screening:  Based on this negative test result, it is important for Carli and her relatives to refer back to the family history for appropriate cancer screening.    Based on her personal and family history, Carli has a 11.8% lifetime risk of developing breast cancer based on the LALITHA (v8) model. Therefore, Carli does not meet current National Comprehensive Cancer Network (NCCN) guidelines for high risk breast screening, which is offered to women with a 20% lifetime risk or higher. However, it is still important for Carli to continue with routine breast screening under the care of her physicians. Breast cancer screening is generally recommended to begin approximately 10 years younger than the earliest age of breast cancer diagnosis in the family, or at age 40, whichever comes first. Carli is encouraged to discuss breast screening with her physicians.   Other population cancer screening options, such as those recommended by the American Cancer Society and the National Comprehensive Cancer Network (NCCN), are also appropriate for Carli and her family. These screening recommendations may change if there are changes to Carli's personal and/or family history. Final screening recommendations should be made by each individual's managing physician.        Additional Testing Considerations:  Although Carli's genetic testing result was negative, other relatives may still carry a gene mutation associated with their personal/family history of cancer. Genetic counseling is recommended for her sister and maternal and paternal relatives to discuss genetic testing options. If any of these relatives do pursue genetic testing, Carli is encouraged to contact me so that we may review the impact of their test results on her.    Summary:  We do not have an  explanation for Carli's family history of cancer. While no genetic changes were identified, Carli may still be at risk for certain cancers due to family history, environmental factors, or other genetic causes not identified by this test.  Because of that, it is important that she continue with cancer screening based on her personal and family history as discussed above.    Genetic testing is rapidly advancing, and new cancer susceptibility genes will most likely be identified in the future.  Therefore, I encouraged Carli to contact me annually or if there are changes in her personal or family history.  This may change how we assess her cancer risk, screening, and the testing we would offer.    Plan:  1. A copy of the test results will be mailed to Carli.  2. She plans to follow-up with her primary care providers for routine care and cancer screenings.  3. She should contact me regularly, or sooner if her family history changes.    Jessica Hudson MS, [facility]  Licensed, Certified Genetic Counselor  [facility]  [email or web address]

## 2023-04-20 NOTE — PATIENT INSTRUCTIONS
Negative Genetic Test Result    Genetic Testing  Genetic testing involved a blood or saliva test which looked at the genetic information in select genes for variants associated with cancer risk.  This testing may have included analysis of a single gene due to a known variant in the family, multiple genes most associated with the cancers in a family, or an expanded panel of genes related to many types of cancers.     Results  There are several possible genetic test results, including:   Positive--a harmful mutation (also known as a  pathogenic  or  likely pathogenic  variant) was identified in a gene associated with increased cancer risk.  These risks, as well as medical management options, depend on the specific genetic variant identified.    Negative--no variants were identified in the genes analyzed   Variant of unknown significance--a variant was identified in one or more genes, though it is currently unclear how this impacts cancer risk in the family.  Genetic testing labs are working to collect evidence about these uncertain variants and may provide updates in the future.    What Does a Negative Genetic Test Result Mean?  A negative genetic test results means that no genetic changes (variants) were detected in the genes tested. While no inherited risk factors for cancer were identified, you and your family may be at risk for certain cancers due to family history, environmental factors, or other genetic causes not identified by this test.  It is also possible that your family may still be at risk of carrying a genetic risk factor which you did not inherit. Your genetic counselor can help interpret the result for you and your relatives.      It is important to note which genes were included in your test. A list of these genes can be found on your test result.    Screening Recommendations  A combination of personal and family history factors may inform cancer risk and medical management recommendations.   Population cancer screening options, such as those recommended by the American Cancer Society and the National Comprehensive Cancer Network (NCCN) are appropriate for many families at average risk for cancer.  However, earlier and/or more frequent screening may be recommended based on personal factors (lifestyle, exposures, medications, screening results), family history of cancer, and sometimes genetic factors.  These cancer risk management options should be discussed in more detail with an individual's medical providers.      Please call us if you have any questions or concerns.   Cancer Risk Management Program (Appointments: [phone])

## 2023-04-20 NOTE — NURSING NOTE
Is the patient currently in the state of [place]? YES    Visit mode:VIDEO    If the visit is dropped, the patient can be reconnected by: TELEPHONE VISIT: Phone number: [phone]    Will anyone else be joining the visit? NO      How would you like to obtain your AVS? MyChart    Are changes needed to the allergy or medication list? NO    Reason for visit: Video Visit        Neema Garcia VF

## 2023-05-11 ENCOUNTER — OFFICE VISIT (OUTPATIENT)
Dept: FAMILY MEDICINE | Facility: CLINIC | Age: 73
End: 2023-05-11
Payer: MEDICARE

## 2023-05-11 VITALS — DIASTOLIC BLOOD PRESSURE: 79 MMHG | SYSTOLIC BLOOD PRESSURE: 129 MMHG

## 2023-05-11 DIAGNOSIS — Z00.00 ENCOUNTER FOR MEDICARE ANNUAL WELLNESS EXAM: ICD-10-CM

## 2023-05-11 DIAGNOSIS — M80.80XA LOCALIZED OSTEOPOROSIS WITH CURRENT PATHOLOGICAL FRACTURE, INITIAL ENCOUNTER: Primary | ICD-10-CM

## 2023-05-11 PROCEDURE — 90471 IMMUNIZATION ADMIN: CPT | Performed by: FAMILY MEDICINE

## 2023-05-11 PROCEDURE — 91312 COVID-19 BIVALENT 12+ (PFIZER): CPT | Performed by: FAMILY MEDICINE

## 2023-05-11 PROCEDURE — 0124A COVID-19 BIVALENT 12+ (PFIZER): CPT | Performed by: FAMILY MEDICINE

## 2023-05-11 PROCEDURE — 90715 TDAP VACCINE 7 YRS/> IM: CPT | Performed by: FAMILY MEDICINE

## 2023-05-11 PROCEDURE — G0439 PPPS, SUBSEQ VISIT: HCPCS | Performed by: FAMILY MEDICINE

## 2023-05-11 RX ORDER — ALENDRONATE SODIUM 70 MG/1
70 TABLET ORAL
Qty: 12 TABLET | Refills: 3 | Status: SHIPPED | OUTPATIENT
Start: 2023-05-11 | End: 2024-06-06

## 2023-05-11 ASSESSMENT — ENCOUNTER SYMPTOMS
DYSURIA: 0
HEADACHES: 0
FREQUENCY: 0
DIARRHEA: 0
DIZZINESS: 0
ARTHRALGIAS: 1
NAUSEA: 0
COUGH: 0
SORE THROAT: 0
PALPITATIONS: 0
HEMATOCHEZIA: 0
PARESTHESIAS: 0
NERVOUS/ANXIOUS: 0
HEARTBURN: 0
EYE PAIN: 0
ABDOMINAL PAIN: 0
WEAKNESS: 0
CHILLS: 0
FEVER: 0
BREAST MASS: 0
MYALGIAS: 1
SHORTNESS OF BREATH: 0
JOINT SWELLING: 0
HEMATURIA: 0
CONSTIPATION: 0

## 2023-05-11 ASSESSMENT — ACTIVITIES OF DAILY LIVING (ADL): CURRENT_FUNCTION: NO ASSISTANCE NEEDED

## 2023-05-11 NOTE — PROGRESS NOTES
"SUBJECTIVE:   Carli is a 72 year old who presents for Preventive Visit.       View : No data to display.              Patient has been advised of split billing requirements and indicates understanding: Yes  Are you in the first 12 months of your Medicare coverage?  No    Healthy Habits:     In general, how would you rate your overall health?  Good    Frequency of exercise:  4-5 days/week    Duration of exercise:  30-45 minutes    Do you usually eat at least 4 servings of fruit and vegetables a day, include whole grains    & fiber and avoid regularly eating high fat or \"junk\" foods?  Yes    Taking medications regularly:  Yes    Medication side effects:  None    Ability to successfully perform activities of daily living:  No assistance needed    Home Safety:  No safety concerns identified    Hearing Impairment:  Difficulty following a conversation in a noisy restaurant or crowded room    In the past 6 months, have you been bothered by leaking of urine?  No    In general, how would you rate your overall mental or emotional health?  Good      PHQ-2 Total Score: 0    Additional concerns today:  Yes      Have you ever done Advance Care Planning? (For example, a Health Directive, POLST, or a discussion with a medical provider or your loved ones about your wishes): Yes, advance care planning is on file.    Hearing Acuity: Pass    Hearing Assessment: normal -done at audiology 2 years ago.   Fall risk Normal     Cognitive Screening   1) Repeat 3 items (Leader, Season, Table)    2) Clock draw: NORMAL  3) 3 item recall: Recalls 3 objects  Results: 3 items recalled: COGNITIVE IMPAIRMENT LESS LIKELY    Mini-CogTM Copyright RONALDO Arreguin. Licensed by the author for use in [facility]; reprinted with permission (radha@.[email or web address]). All rights reserved.      Do you have sleep apnea, excessive snoring or daytime drowsiness?: no    Reviewed and updated as needed this visit by clinical staff   Tobacco  Allergies  Meds  Problems  " Med Hx  Surg Hx  Fam Hx          Reviewed and updated as needed this visit by Provider   Tobacco  Allergies  Meds  Problems  Med Hx  Surg Hx  Fam Hx         Social History     Tobacco Use     Smoking status: Never     Smokeless tobacco: Never   Vaping Use     Vaping status: Never Used   Substance Use Topics     Alcohol use: Yes     Alcohol/week: 5.8 - 7.5 standard drinks of alcohol     Types: 7 - 9 Standard drinks or equivalent per week             5/11/2023    11:35 AM   Alcohol Use   Prescreen: >3 drinks/day or >7 drinks/week? Yes   drinks 2 ounces of alcohol a day  Do you have a current opioid prescription? No  Do you use any other controlled substances or medications that are not prescribed by a provider? None          Current providers sharing in care for this patient include:   Patient Care Team:  Houston Menchaca MD as PCP - General (Family Practice)  Houston Menchaca MD as MD (Family Practice)  Frank Diamond MD as MD (Colon and Rectal Surgery)  Houston Menchaca MD as Assigned PCP    The following health maintenance items are reviewed in Epic and correct as of today:  Health Maintenance   Topic Date Due     COVID-19 Vaccine (6 - Moderna series) 02/12/2023     MEDICARE ANNUAL WELLNESS VISIT  04/04/2023     FALL RISK ASSESSMENT  04/04/2023     DTAP/TDAP/TD IMMUNIZATION (5 - Td or Tdap) 05/07/2023     LIPID  11/28/2023     MAMMO SCREENING  08/11/2024     DEXA  01/03/2025     COLORECTAL CANCER SCREENING  12/02/2026     ADVANCE CARE PLANNING  05/11/2028     HEPATITIS C SCREENING  Completed     PHQ-2 (once per calendar year)  Completed     INFLUENZA VACCINE  Completed     Pneumococcal Vaccine: 65+ Years  Completed     IPV IMMUNIZATION  Completed     MENINGITIS IMMUNIZATION  Aged Out     ZOSTER IMMUNIZATION  Discontinued       Last 3 Pap and HPV Results:       7/20/2011    12:00 AM 6/17/2010    12:00 AM   PAP / HPV   PAP (Historical) NIL   NIL             Review of Systems   Constitutional:  "Negative for chills and fever.   HENT: Positive for hearing loss. Negative for congestion, ear pain and sore throat.    Eyes: Negative for pain and visual disturbance.   Respiratory: Negative for cough and shortness of breath.    Cardiovascular: Negative for chest pain, palpitations and peripheral edema.   Gastrointestinal: Negative for abdominal pain, constipation, diarrhea, heartburn, hematochezia and nausea.   Breasts:  Negative for tenderness, breast mass and discharge.   Genitourinary: Negative for dysuria, frequency, genital sores, hematuria, pelvic pain, urgency, vaginal bleeding and vaginal discharge.   Musculoskeletal: Positive for arthralgias and myalgias. Negative for joint swelling.   Skin: Negative for rash.   Neurological: Negative for dizziness, weakness, headaches and paresthesias.   Psychiatric/Behavioral: Negative for mood changes. The patient is not nervous/anxious.          OBJECTIVE:   /79 (BP Location: Left arm, Patient Position: Sitting, Cuff Size: Adult Regular)  Estimated body mass index is 25.02 kg/m  as calculated from the following:    Height as of 10/31/22: 1.499 m (4' 11\").    Weight as of 11/3/22: 56.2 kg (123 lb 14.4 oz).  Physical Exam  Vitals reviewed.   Constitutional:       Appearance: She is well-developed.   HENT:      Head: Normocephalic.      Right Ear: External ear normal.      Left Ear: External ear normal.      Nose: Nose normal.      Mouth/Throat:      Pharynx: No oropharyngeal exudate.   Eyes:      Conjunctiva/sclera: Conjunctivae normal.      Pupils: Pupils are equal, round, and reactive to light.   Cardiovascular:      Rate and Rhythm: Normal rate and regular rhythm.      Heart sounds: Normal heart sounds. No murmur heard.  Pulmonary:      Effort: Pulmonary effort is normal. No respiratory distress.      Breath sounds: Normal breath sounds. No wheezing.   Chest:   Breasts:     Right: Normal.      Left: Normal.   Abdominal:      General: There is no distension.     " " Tenderness: There is no abdominal tenderness.   Genitourinary:     Vagina: Normal. No vaginal discharge.   Musculoskeletal:      Cervical back: Normal range of motion and neck supple.   Lymphadenopathy:      Upper Body:      Right upper body: No supraclavicular or pectoral adenopathy.      Left upper body: No supraclavicular or pectoral adenopathy.   Skin:     General: Skin is warm and dry.   Neurological:      Mental Status: She is alert and oriented to person, place, and time.      Deep Tendon Reflexes: Reflexes are normal and symmetric.   Psychiatric:         Behavior: Behavior normal.         Thought Content: Thought content normal.               ASSESSMENT / PLAN:   (M80.80XA) Localized osteoporosis with current pathological fracture, initial encounter  (primary encounter diagnosis)  Comment: discussed risks and benefits of Osteoporosis treatment patient elected to start the weekly option  Plan: alendronate (FOSAMAX) 70 MG tablet        Advised to take on an empty stomach, remain upright for 30 miniutes    (Z00.00) Encounter for Medicare annual wellness exam  Comment: normal AWV  Plan: follow up in one year           COUNSELING:  Reviewed preventive health counseling, as reflected in patient instructions       Regular exercise       Healthy diet/nutrition       Hearing screening      BMI:   Estimated body mass index is 25.02 kg/m  as calculated from the following:    Height as of 10/31/22: 1.499 m (4' 11\").    Weight as of 11/3/22: 56.2 kg (123 lb 14.4 oz).         She reports that she has never smoked. She has never used smokeless tobacco.      Appropriate preventive services were discussed with this patient, including applicable screening as appropriate for cardiovascular disease, diabetes, osteopenia/osteoporosis, and glaucoma.  As appropriate for age/gender, discussed screening for colorectal cancer, prostate cancer, breast cancer, and cervical cancer. Checklist reviewing preventive services available has " been given to the patient.    Reviewed patients plan of care and provided an AVS. The Basic Care Plan (routine screening as documented in Health Maintenance) for Carli meets the Care Plan requirement. This Care Plan has been established and reviewed with the Patient.          Houston Menchaca MD  [facility]    Identified Health Risks:    I have reviewed Opioid Use Disorder and Substance Use Disorder risk factors and made any needed referrals.         The patient was provided with written information regarding signs of hearing loss.

## 2023-05-11 NOTE — PATIENT INSTRUCTIONS
Patient Education   Personalized Prevention Plan  You are due for the preventive services outlined below.  Your care team is available to assist you in scheduling these services.  If you have already completed any of these items, please share that information with your care team to update in your medical record.  Health Maintenance Due   Topic Date Due     COVID-19 Vaccine (6 - Moderna series) 02/12/2023     Annual Wellness Visit  04/04/2023     FALL RISK ASSESSMENT  04/04/2023     Diptheria Tetanus Pertussis (DTAP/TDAP/TD) Vaccine (5 - Td or Tdap) 05/07/2023       Signs of Hearing Loss  Hearing loss is a problem shared by many people. In fact, it's one of the most common health problems, particularly as people age. Most people aged 65 and older have some hearing loss. By age 80, almost everyone does. Hearing loss often occurs slowly over the years. So, you may not realize your hearing has gotten worse.   When sudden hearing loss occurs, it's important to contact your healthcare provider right away. Your provider will do a medical exam and a hearing exam as soon as possible. This is to help find the cause and type of your sudden hearing loss. Based on your diagnosis, your healthcare provider will discuss possible treatments.      Hearing much better with one ear can be a sign of hearing loss.     Have your hearing checked  Call your healthcare provider if you:     Have to strain to hear normal conversation    Have to watch other people s faces very carefully to follow what they re saying    Need to ask people to repeat what they ve said    Often misunderstand what people are saying    Turn the volume of the television or radio up so high that others complain    Feel that people are mumbling when they re talking to you    Find that the effort to hear leaves you feeling tired and irritated    Notice, when using the phone, that you hear better with one ear than the other  Don last reviewed this educational  content on 6/1/2022 2000-2022 [employer]. All rights reserved. This information is not intended as a substitute for professional medical care. Always follow your healthcare professional's instructions.

## 2023-05-24 ENCOUNTER — THERAPY VISIT (OUTPATIENT)
Dept: PHYSICAL THERAPY | Facility: CLINIC | Age: 73
End: 2023-05-24
Payer: MEDICARE

## 2023-05-24 DIAGNOSIS — S72.001A CLOSED FRACTURE OF NECK OF RIGHT FEMUR, INITIAL ENCOUNTER (H): Primary | ICD-10-CM

## 2023-05-24 PROCEDURE — 97110 THERAPEUTIC EXERCISES: CPT | Mod: GP | Performed by: PHYSICAL THERAPIST

## 2023-05-31 ENCOUNTER — OFFICE VISIT (OUTPATIENT)
Dept: FAMILY MEDICINE | Facility: CLINIC | Age: 73
End: 2023-05-31
Payer: MEDICARE

## 2023-05-31 VITALS
BODY MASS INDEX: 23.95 KG/M2 | WEIGHT: 118.8 LBS | HEIGHT: 59 IN | DIASTOLIC BLOOD PRESSURE: 71 MMHG | TEMPERATURE: 98.9 F | OXYGEN SATURATION: 97 % | RESPIRATION RATE: 16 BRPM | HEART RATE: 74 BPM | SYSTOLIC BLOOD PRESSURE: 137 MMHG

## 2023-05-31 DIAGNOSIS — M76.31 ILIOTIBIAL BAND SYNDROME OF RIGHT SIDE: ICD-10-CM

## 2023-05-31 DIAGNOSIS — M99.03 SOMATIC DYSFUNCTION OF LUMBAR REGION: ICD-10-CM

## 2023-05-31 DIAGNOSIS — M99.04 SOMATIC DYSFUNCTION OF SACRAL REGION: ICD-10-CM

## 2023-05-31 DIAGNOSIS — S72.001S CLOSED FRACTURE OF NECK OF RIGHT FEMUR, SEQUELA: Primary | ICD-10-CM

## 2023-05-31 DIAGNOSIS — M99.06 SOMATIC DYSFUNCTION OF LOWER EXTREMITIES: ICD-10-CM

## 2023-05-31 PROCEDURE — 99213 OFFICE O/P EST LOW 20 MIN: CPT | Mod: 25 | Performed by: STUDENT IN AN ORGANIZED HEALTH CARE EDUCATION/TRAINING PROGRAM

## 2023-05-31 PROCEDURE — 98926 OSTEOPATH MANJ 3-4 REGIONS: CPT | Mod: GC | Performed by: STUDENT IN AN ORGANIZED HEALTH CARE EDUCATION/TRAINING PROGRAM

## 2023-05-31 RX ORDER — VITAMIN B COMPLEX
TABLET ORAL
COMMUNITY
Start: 2022-12-12 | End: 2024-01-03

## 2023-05-31 ASSESSMENT — PAIN SCALES - GENERAL: PAINLEVEL: MILD PAIN (2)

## 2023-05-31 NOTE — PATIENT INSTRUCTIONS
"What Is OMT (Osteopathic Manipulative Treatment)?    As part of their education, DOs (doctor of osteopathic medicine) receive special training in the musculoskeletal system (your nerves, bones and muscles).     OMT involves using the hands to diagnose, treat and prevent illness or injury.  Using OMT, your osteopathic physician will move your muscles and joints using techniques including stretching, gentle pressure and resistance.     OMT can help people of all ages and backgrounds. In addition to muscle or joint pain, it can be used to treat a variety of conditions such as asthma, sinus pain, migraines and carpal tunnel syndrome.     For more information, please visit [email or web address]    How to Schedule OMT :  Please make an appointment for \"OMT\" with the .  Please inform them you are scheduling for OMT with any DO provider. A list is provided below:     Allie Ho, DO  Deandre Bloom, DO  Claire Edwards, DO  Bailey Todd, DO  Kathy Vega, DO  Sherly Horan, DO  Yareli Hanson, DO  Gianluca Abernathy, DO  Main Haas, DO  Felicia Ramsey, DO        "

## 2023-05-31 NOTE — PROGRESS NOTES
"HPI      Carli is a 72 year old who presents today for Osteopathic Evaluation.   Chief Complaint   Patient presents with     Pain     OMT, hip fracture from last October 2022      Fracture hip on halloween of this year 2022  Plateau phase of pain   Right thigh and leg (points to IT band) sometimes in groin  Does some light squats   More time in car - in cabin   Takes tylenol 8 hour extra strength  [facility])   Does ice   Goes to PT every 4-5 weeks to touch up on things   Would be able to sit on floor with legs crossed   Range of motion still very difficult   Has foam roller - used many years ago found it difficult     All active medical problems, med list, PMHx, and social Hx updated and reviewed.    Allergies   Allergen Reactions     Sulfa Antibiotics      Review of Systems       ROS      10 point ROS neg other than the symptoms noted above here or in the HPI.    Physical Exam     Vitals:    05/31/23 1450   BP: 137/71   Pulse: 74   Resp: 16   Temp: 98.9  F (37.2  C)   TempSrc: Oral   SpO2: 97%   Weight: 53.9 kg (118 lb 12.8 oz)   Height: 1.499 m (4' 11\")     Vitals were reviewed    No results found for any visits on 05/31/23.    Physical Exam  General: Alert and oriented, in no acute distress.  Skin: Warm and dry, no abnormalities noted.  Eyes: Extra-ocular muscles intact, pupils equal and reactive.  ENT: Speech intact, nasal passages open, no hearing impairment noted.  Neck: Supple, no swelling noted.  CV: No cyanosis or pallor, warm and well perfused.  Respiratory: No respiratory distress, no accessory muscle use.  Neuro: Gait and station normal, comprehension intact. Gross and fine motor skills intact.   Psychiatric: Mood and affect appear normal.   Extremities: Warm, able to move all four extremities at will.    Lumbar Dysfunction: Paraspinal Muscle: fullness, tenderness and >warmth  Sacrum Dysfunction: Torsion: left on left  Extremity: Tenderness along IT " band. Limited ROM with external and internal rotation of right hip compared to left. Tight iliopsoas muscle.   Osteopathic Structural Exam and additional MSK exam found below in OMT Procedure Note.  Assessment and Plan       Closed fracture of neck of right femur, sequela  Iliotibial band syndrome of right side  Given that this has been interfering with the patient's quality of life and ADLs, after discussion, informed consent, and medical assessment for safety, we have together decided to address this concern with Osteopathic Manipulative Treatment. Pt with residual leg pain 2/2 from recent femur fracture and healing. Also has symptoms of IT band pain which could be related to the healing and overall somatic dysfunction of the area. Discussed at length today other PT exercises she could try, discussing with physical therapist advancing some of her moves to the next level (especially with balance and strength exercises). Notable still lack of ROM in R hip external and internal rotation. Patient overall goal to be able to sit cross legged again and be able to go on car trips without significant pain. Discussed stretching exercise recommendations (including IT band as well as external rotator for hips). Demonstrated several stretches for patient. Also demonstrated and recommended foam rolling for IT band and quadriceps muscles. Limited OMT done today due to initial assessment; pt would like to return for further assessment and treatment in 2-3 weeks.     Somatic dysfunction of sacral region  Somatic dysfunction of lower extremities  Somatic dysfunction of lumbar region  Please see OMT Procedure Note below for the specifics of treatment.  -     OSTEOPATHIC MANIP,3-4 BODY FLOR Vega DO  Family Medicine PGY-3  [facility], [facility]   Pronouns: She/Hers       OMT PROCEDURE NOTE    Body Region:  L-spine, Sacrum, and Pelvis/ Innominates  Somatic Dysfunction: Lumbar Dysfunction:  Paraspinal Muscle: fullness, tenderness and >warmth Sacrum Dysfunction: Torsion: left on left  Treatment: Balancing Ligaments/Torque Unwinding and Soft Tissue Techniques.   Outcome: Improved    Body Region: LE and/or Feet  Somatic Dysfunction: Extremity: Tenderness along IT band. Limited ROM with external and internal rotation of right hip compared to left. Tight iliopsoas muscle.   Treatment: Muscle energy for hamstrings and quadriceps. Counterstrain for (R) piriformis tenderness.  Soft Tissue Techniques. Sacral rocking.   Outcome: Improved    The patient actively participated in OMT and was able to communicate both positive and negative feedback throughout. OMT completed without incident. Patient tolerated treatment well. Patient reported that ROM, function, and/or pain level were improved. Advised that pain is occasionally worse during the first 24 hours after treatment and that drinking more water and taking Tylenol or Ibuprofen often help. Patient to return in 2 week/s or as needed for repeat osteopathic assessment.     Liza Vega, DO

## 2023-06-22 ENCOUNTER — OFFICE VISIT (OUTPATIENT)
Dept: FAMILY MEDICINE | Facility: CLINIC | Age: 73
End: 2023-06-22
Payer: MEDICARE

## 2023-06-22 VITALS
WEIGHT: 118.2 LBS | TEMPERATURE: 97.9 F | BODY MASS INDEX: 23.83 KG/M2 | HEIGHT: 59 IN | OXYGEN SATURATION: 100 % | DIASTOLIC BLOOD PRESSURE: 71 MMHG | HEART RATE: 65 BPM | SYSTOLIC BLOOD PRESSURE: 139 MMHG | RESPIRATION RATE: 15 BRPM

## 2023-06-22 DIAGNOSIS — M99.03 SOMATIC DYSFUNCTION OF LUMBAR REGION: ICD-10-CM

## 2023-06-22 DIAGNOSIS — M99.04 SOMATIC DYSFUNCTION OF SACRAL REGION: ICD-10-CM

## 2023-06-22 DIAGNOSIS — M99.06 SOMATIC DYSFUNCTION OF LOWER EXTREMITIES: ICD-10-CM

## 2023-06-22 DIAGNOSIS — S72.001S CLOSED FRACTURE OF NECK OF RIGHT FEMUR, SEQUELA: Primary | ICD-10-CM

## 2023-06-22 DIAGNOSIS — M99.05 SOMATIC DYSFUNCTION OF PELVIC REGION: ICD-10-CM

## 2023-06-22 DIAGNOSIS — M76.31 ILIOTIBIAL BAND SYNDROME OF RIGHT SIDE: ICD-10-CM

## 2023-06-22 PROCEDURE — 99212 OFFICE O/P EST SF 10 MIN: CPT | Mod: 25 | Performed by: STUDENT IN AN ORGANIZED HEALTH CARE EDUCATION/TRAINING PROGRAM

## 2023-06-22 PROCEDURE — 98926 OSTEOPATH MANJ 3-4 REGIONS: CPT | Mod: GC | Performed by: STUDENT IN AN ORGANIZED HEALTH CARE EDUCATION/TRAINING PROGRAM

## 2023-06-22 ASSESSMENT — PAIN SCALES - GENERAL: PAINLEVEL: NO PAIN (0)

## 2023-06-22 NOTE — PROGRESS NOTES
"HPI      Carli is a 72 year old who presents today for Osteopathic Evaluation.   Chief Complaint   Patient presents with     Massage     ROM hip        Fractured hip halloween of    Pain still outside of right leg, radiates or can get into groin   [place] the city [place] the [place]  Has been doing more gardening so has been doing a lot of mobility and exercise out in the garden (more active than normally in the summer)  Has been working on the frog stretch a lot especially at night before bedtime  She was unable to see PT ( and then scheduling issues with vacation / out of town) but she does have a visit with her established PT in July  Reviewed together different questions to ask PT including progression of exercises and prioritization of exercises  She did notice some increased range of motion from our last visit in her external hip rotation   She started doing IT band stretches from the [facility] web site   She was able to do rolling out (like foam rolling) with the rolling pin - has been working well to loosen up muscles  Has ability to do ice and heat at home with bean filled pack     All active medical problems, med list, PMHx, and social Hx updated and reviewed.    Allergies   Allergen Reactions     Sulfa Antibiotics      Review of Systems       ROS      10 point ROS neg other than the symptoms noted above here or in the HPI.    Physical Exam     Vitals:    23 0938   BP: 139/71   BP Location: Left arm   Patient Position: Sitting   Cuff Size: Adult Regular   Pulse: 65   Resp: 15   Temp: 97.9  F (36.6  C)   SpO2: 100%   Weight: 53.6 kg (118 lb 3.2 oz)   Height: 1.499 m (4' 11\")     Vitals were reviewed    Physical Exam  General: Alert and oriented, in no acute distress.  Skin: Warm and dry, no abnormalities noted.  Eyes: Extra-ocular muscles intact, pupils equal and reactive.  ENT: Speech intact, nasal passages open, no hearing impairment noted.  Neck: " Supple, no swelling noted.  CV: No cyanosis or pallor, warm and well perfused.  Respiratory: No respiratory distress, no accessory muscle use.  Neuro: Gait and station normal, comprehension intact. Gross and fine motor skills intact.   Psychiatric: Mood and affect appear normal.   Extremities: Warm, able to move all four extremities at will.    Lumbar Dysfunction: Paraspinal Muscle: fullness, tenderness and >warmth  Pelvic Dysfunction: right and posterior innominate  Sacrum Dysfunction: Torsion: left on left  Extremity: Tenderness along IT band. + marni test. Less external hip rotation R compared to L. R quadricep with more restriction than left. Good ROM with forward flexion (good hamstring flexibility). Able to sit in butterfly position today with more external rotation and move to beginning of pigeon stretch.   Osteopathic Structural Exam and additional MSK exam found below in OMT Procedure Note.  Assessment and Plan     Closed fracture of neck of right femur, sequela  Iliotibial band syndrome of right side  Given that this has been interfering with the patient's quality of life and ADLs, after discussion, informed consent, and medical assessment for safety, we have together decided to address this concern with Osteopathic Manipulative Treatment. Today patient had significant improvement in external rotation of the right hip compared to last visit; she has been doing home stretching exercises (see HPI). Discussed continuing on working on IT band and hip stretches. Demonstrated IT and Hip stretches together. Reviewed together how to roll out muscles. Discussed how to best use heat and ice in recovery. Demonstrated pelvis resetting exercises (pubic symphysis decompression ME).     Somatic dysfunction of sacral region  Somatic dysfunction of lower extremities  Somatic dysfunction of lumbar region  Somatic dysfunction of pelvic region  Please see OMT Procedure Note below for the specifics of treatment.  -      OSTEOPATHIC MANIP,3-4 BODY REGN    Kathy Vega DO  Family Medicine PGY-3  [facility], [facility]   Pronouns: She/Hers       OMT PROCEDURE NOTE    Body Region:  L-spine, Sacrum, and Pelvis/ Innominates  Somatic Dysfunction: Lumbar Dysfunction: Paraspinal Muscle: fullness, tenderness and >warmth  Pelvic Dysfunction: right and posterior innominate Sacrum Dysfunction: Torsion: left on lef  Treatment: Counterstrain, Soft Tissue, Balanced ligamentous tension, and Muscle energy Techniques.   Outcome: Improved    Body Region: LE and/or Feet  Somatic Dysfunction: Extremity: Tenderness along IT band. + marni test. Less external hip rotation R compared to L. R quadricep with more restriction than left. Good ROM with forward flexion (good hamstring flexibility). Able to sit in butterfly position today with more external rotation and move to beginning of pigeon stretch.   Treatment: HVLA and Soft Tissue Techniques.   Outcome: Improved    The patient actively participated in OMT and was able to communicate both positive and negative feedback throughout. OMT completed without incident. Patient tolerated treatment well. Patient reported that ROM, function, and/or pain level were improved. Advised that pain is occasionally worse during the first 24 hours after treatment and that drinking more water and taking Tylenol or Ibuprofen often help. Patient to return in 2-4 week/s or as needed for repeat osteopathic assessment.     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 Gave referrals for other osteopathic physicians in clinic to see in 2-4 weeks for repeat osteopathic evaluation.     Liza Vega DO

## 2023-07-19 ENCOUNTER — THERAPY VISIT (OUTPATIENT)
Dept: PHYSICAL THERAPY | Facility: CLINIC | Age: 73
End: 2023-07-19
Payer: MEDICARE

## 2023-07-19 DIAGNOSIS — S72.001A CLOSED FRACTURE OF NECK OF RIGHT FEMUR, INITIAL ENCOUNTER (H): Primary | ICD-10-CM

## 2023-07-19 PROCEDURE — 97110 THERAPEUTIC EXERCISES: CPT | Mod: GP | Performed by: PHYSICAL THERAPIST

## 2023-07-19 NOTE — PROGRESS NOTES
[facility]                                                                                   OUTPATIENT PHYSICAL THERAPY    PLAN OF TREATMENT FOR OUTPATIENT REHABILITATION   Patient's Last Name, First Name, Carli Garcia YOB: 1950   Provider's Name   [facility]   Medical Record No.  [id number]     Onset Date: 12/21/22  Start of Care Date:  12/21/22     Medical Diagnosis:  R hip fracture      PT Treatment Diagnosis:   R hip pain Plan of Treatment  Frequency/Duration: 1x/month/ 3 months    Certification date from 06/21/23 to 09/20/23         See note for plan of treatment details and functional goals     SAMANTHA CROSS, PT                         I CERTIFY THE NEED FOR THESE SERVICES FURNISHED UNDER        THIS PLAN OF TREATMENT AND WHILE UNDER MY CARE     (Physician attestation of this document indicates review and certification of the therapy plan).                Referring Provider:  Houston Menchaca      Initial Assessment  See Epic Evaluation-           07/19/23 0500   Appointment Info   Signing clinician's name / credentials Samantha Cross, PT   Visits Used 10   Medical Diagnosis R hip fracture   Other pertinent information Planned trip in late Nov, wants to be in good shape before then.   Quick Adds Certification   Progress Note/Certification   Onset of illness/injury or Date of Surgery 12/21/22   Therapy Frequency 1x/month   Predicted Duration 3 months   Certification date from 06/21/23   Certification date to 09/20/23   PT Goal 1   Goal Identifier Standing   Goal Description Patient will be able to stand for longer than 60 minutes without increased RLE symptoms in order to perform housework   Goal Progress Baseline: 5 minutes; 7/19 - 20 minutes   Target Date 08/02/23   Subjective Report   Subjective Report Patient hasn't been seen in 2 months, feeling like the leg is getting more bothersome for her, feeling like she might  be overdoing it and been lacking on her exericses some as well. She is feeling some tightness behind the back of the knee, car riding is still bothersome a little bit too. She feels like it hurts to walk up stairs, she does weight bearing exercise and that hurts too.   Treatment Interventions (PT)   Interventions Therapeutic Procedure/Exercise;Manual Therapy   Therapeutic Procedure/Exercise   Therapeutic Procedures: strength, endurance, ROM, flexibillity minutes (99428) 48   Therapeutic Procedures Ther Proc 2;Ther Proc 3;Ther Proc 4   Ther Proc 1 EDU   Ther Proc 1 - Details On pigeon pose, on driving with breaks, elio pose, getting up and down from the floor   Ther Proc 2 PT and patient spent marked time talking about modifications to her activity levels, how to modify her exercises, and answered all questions - needs to give herself breaks more often than not for household chores.   PTRx Ther Proc 1 Hip Flexion Straight Leg Raise   PTRx Ther Proc 2 Hip Abduction Straight Leg Raise   PTRx Ther Proc 3 Piriformis Stretch Above 90 Degress Supine   PTRx Ther Proc 4 Quadratus Lumborum Stretch   PTRx Ther Proc 5 Bridging #1   PTRx Ther Proc 6 Treatment for Anterior/Posterior Ilium Standing- Muscle Energy Technique (MET)   PTRx Ther Proc 7 Single Knee to Chest   PTRx Ther Proc 8 Double Knee to Chest   PTRx Ther Proc 9 Supine Butterfly   PTRx Ther Proc 10 Squat   PTRx Ther Proc 11 Toe Raises   PTRx Ther Proc 12 Toe Raises with Support   PTRx Ther Proc 13 Standing Gastroc Stretch   PTRx Ther Proc 14 Standing Soleus Stretch   Skilled Intervention cues for posture, positioning and appropriate form   Patient Response/Progress tolerated OK and verbalized understanding   Plan   Plan for next session review ongoing HEP, hip adductor strengthening and stretching   Total Session Time   Timed Code Treatment Minutes 48   Total Treatment Time (sum of timed and untimed services) 48

## 2023-08-15 ENCOUNTER — HOSPITAL ENCOUNTER (OUTPATIENT)
Dept: MAMMOGRAPHY | Facility: CLINIC | Age: 73
Discharge: HOME | End: 2023-08-15
Attending: FAMILY MEDICINE | Admitting: FAMILY MEDICINE
Payer: MEDICARE

## 2023-08-15 DIAGNOSIS — Z12.31 VISIT FOR SCREENING MAMMOGRAM: ICD-10-CM

## 2023-08-15 PROCEDURE — 77067 SCR MAMMO BI INCL CAD: CPT

## 2023-08-30 ENCOUNTER — THERAPY VISIT (OUTPATIENT)
Dept: PHYSICAL THERAPY | Facility: CLINIC | Age: 73
End: 2023-08-30
Payer: MEDICARE

## 2023-08-30 DIAGNOSIS — S72.001A CLOSED FRACTURE OF NECK OF RIGHT FEMUR, INITIAL ENCOUNTER (H): Primary | ICD-10-CM

## 2023-08-30 PROCEDURE — 97110 THERAPEUTIC EXERCISES: CPT | Mod: GP | Performed by: PHYSICAL THERAPIST

## 2023-09-21 ENCOUNTER — MYC MEDICAL ADVICE (OUTPATIENT)
Dept: FAMILY MEDICINE | Facility: CLINIC | Age: 73
End: 2023-09-21
Payer: MEDICARE

## 2023-09-21 DIAGNOSIS — Z29.89 NEED FOR MALARIA PROPHYLAXIS: Primary | ICD-10-CM

## 2023-09-21 RX ORDER — ATOVAQUONE AND PROGUANIL HYDROCHLORIDE 250; 100 MG/1; MG/1
1 TABLET, FILM COATED ORAL DAILY
Qty: 30 TABLET | Refills: 0 | Status: SHIPPED | OUTPATIENT
Start: 2023-09-21 | End: 2024-04-26

## 2023-10-26 ENCOUNTER — THERAPY VISIT (OUTPATIENT)
Dept: PHYSICAL THERAPY | Facility: CLINIC | Age: 73
End: 2023-10-26
Payer: MEDICARE

## 2023-10-26 DIAGNOSIS — S72.001A CLOSED FRACTURE OF NECK OF RIGHT FEMUR, INITIAL ENCOUNTER (H): Primary | ICD-10-CM

## 2023-10-26 DIAGNOSIS — M25.522 LEFT ELBOW PAIN: ICD-10-CM

## 2023-10-26 PROCEDURE — 97110 THERAPEUTIC EXERCISES: CPT | Mod: GP | Performed by: PHYSICAL THERAPIST

## 2023-10-26 PROCEDURE — 97164 PT RE-EVAL EST PLAN CARE: CPT | Mod: GP | Performed by: PHYSICAL THERAPIST

## 2023-10-26 PROCEDURE — 97530 THERAPEUTIC ACTIVITIES: CPT | Mod: GP | Performed by: PHYSICAL THERAPIST

## 2023-10-26 NOTE — PROGRESS NOTES
[facility]                                                                                   OUTPATIENT PHYSICAL THERAPY    PLAN OF TREATMENT FOR OUTPATIENT [id number]   Patient's Last Name, First Name, Carli Garcia YOB: 1950   Provider's Name   [facility]   Medical Record No.  [id number]     Onset Date: 12/21/22  Start of Care Date:    12/21/22   Medical Diagnosis:  R hip fracture      PT Treatment Diagnosis:   R hip fracture Plan of Treatment  Frequency/Duration: 1x/month/ 60 days    Certification date from 09/21/23 to 12/13/23         See note for plan of treatment details and functional goals     SAMANTHA CROSS, PT                         I CERTIFY THE NEED FOR THESE SERVICES FURNISHED UNDER        THIS PLAN OF TREATMENT AND WHILE UNDER MY CARE     (Physician attestation of this document indicates review and certification of the therapy plan).                Referring Provider:  Houston Menchaca      Initial Assessment  See Epic Evaluation-           10/26/23 0500   Appointment Info   Signing clinician's name / credentials Samantha Cross, PT   Visits Used 12   Medical Diagnosis R hip fracture   Other pertinent information Planned trip in late Nov, wants to be in good shape before then.   Quick Adds Certification   Progress Note/Certification   Onset of illness/injury or Date of Surgery 12/21/22   Therapy Frequency 1x/month   Predicted Duration 60 days   Certification date from 09/21/23   Certification date to 12/13/23   PT Goal 1   Goal Identifier Standing   Goal Description Patient will be able to stand for longer than 60 minutes without increased RLE symptoms in order to perform housework   Goal Progress Baseline: 5 minutes; 7/19 - 20 minutes   Target Date 08/02/23   Date Met 10/26/23   Subjective Report   Subjective Report Patient hasn't been seeing in 2 months. She is doing OK with her hip. She had an increase of P  symptoms into the L arm, and this happened on Sunday, she was lifting a heavy pot, didn't feel the pain then, only really feels the pain when she pulls her brastrap up. Patient is feeling more of the pain into the medial L elbow, nothing into the shoulder.   Treatment Interventions (PT)   Interventions Therapeutic Procedure/Exercise;Manual Therapy;Therapeutic Activity   Therapeutic Procedure/Exercise   Therapeutic Procedures: strength, endurance, ROM, flexibillity minutes (20093) 10   Therapeutic Procedures Ther Proc 2;Ther Proc 3;Ther Proc 4   Ther Proc 1 Added elbow exercises to HEP today   Ther Proc 1 - Details PT will place OT/hand order to get evaluation   PTRx Ther Proc 1 Hip Flexion Straight Leg Raise   PTRx Ther Proc 2 Hip Abduction Straight Leg Raise   PTRx Ther Proc 3 Piriformis Stretch Above 90 Degress Supine   PTRx Ther Proc 4 Quadratus Lumborum Stretch   PTRx Ther Proc 5 Bridging #1   PTRx Ther Proc 6 Treatment for Anterior/Posterior Ilium Standing- Muscle Energy Technique (MET)   PTRx Ther Proc 7 Single Knee to Chest   PTRx Ther Proc 8 Double Knee to Chest   PTRx Ther Proc 9 Supine Butterfly   PTRx Ther Proc 10 Squat   PTRx Ther Proc 11 Toe Raises   PTRx Ther Proc 12 Toe Raises with Support   PTRx Ther Proc 13 Standing Gastroc Stretch   PTRx Ther Proc 14 Standing Soleus Stretch   Skilled Intervention cues for posture, positioning and appropriate form   Patient Response/Progress tolerated OK and verbalized understanding   Therapeutic Activity   Therapeutic Activities: dynamic activities to improve functional performance minutes (00475) 15   Ther Act 1 Patient education   Ther Act 1 - Details Patient and PT spent time discussing in length new L elbow pain and how to modify her activies from now on.   Eval/Assessments   Assessments PT Re-Eval   PT Eval, Re-eval Minutes (92393) 15   Plan   Updates to plan of care Will do OT order for hand therapy/left elbow   Plan for next session Patient will trial  indpendence for hip   Total Session Time   Timed Code Treatment Minutes 25   Total Treatment Time (sum of timed and untimed services) 40

## 2023-10-27 ENCOUNTER — TRANSFERRED RECORDS (OUTPATIENT)
Dept: HEALTH INFORMATION MANAGEMENT | Facility: CLINIC | Age: 73
End: 2023-10-27
Payer: MEDICARE

## 2023-11-01 ENCOUNTER — OFFICE VISIT (OUTPATIENT)
Dept: FAMILY MEDICINE | Facility: CLINIC | Age: 73
End: 2023-11-01
Payer: MEDICARE

## 2023-11-01 VITALS
BODY MASS INDEX: 23.79 KG/M2 | HEART RATE: 63 BPM | OXYGEN SATURATION: 99 % | SYSTOLIC BLOOD PRESSURE: 138 MMHG | RESPIRATION RATE: 16 BRPM | WEIGHT: 118 LBS | HEIGHT: 59 IN | DIASTOLIC BLOOD PRESSURE: 71 MMHG

## 2023-11-01 DIAGNOSIS — M25.522 LEFT ELBOW PAIN: Primary | ICD-10-CM

## 2023-11-01 PROBLEM — M77.12 LATERAL EPICONDYLITIS OF LEFT ELBOW: Status: ACTIVE | Noted: 2023-11-01

## 2023-11-01 PROCEDURE — 99213 OFFICE O/P EST LOW 20 MIN: CPT | Mod: GC | Performed by: STUDENT IN AN ORGANIZED HEALTH CARE EDUCATION/TRAINING PROGRAM

## 2023-11-01 NOTE — PROGRESS NOTES
Assessment & Plan     Left elbow pain  Patient presents with acute left elbow pain that started after a day of yard work including leaf blowing for about an hour and caring a heavy flower pot.  Pain improves with rest and NSAIDs.  Has been able to do activities like yoga without pain.  Pain worsens day after activities.  Exam demonstrates tenderness deep in the trochlear notch, just lateral to the olecranon, but is otherwise normal.  Suspect triceps tendinopathy versus plica syndrome.  Already has hand therapy scheduled x3 in the next month.  Discussed the following plan of care:   -Continue use of NSAIDs and acetaminophen as needed, counseling to not take more than 4000 mg of acetaminophen in a 24-hour period  -Ice as needed, especially after activities that increase inflammation  -Attend hand therapy, being sure to do exercises at home  -Let us know if symptoms do not improve      Return if symptoms worsen or fail to improve.    Gerri Espinosa MD  [facility] ARISTIDES Boyce is a 73 year old, presenting for the following health issues:  Pain (Pt here for left elbow pain)      11/1/2023     1:55 PM   Additional Questions   Roomed by Kwame   Accompanied by Self         11/1/2023     1:55 PM   Patient Reported Additional Medications   Patient reports taking the following new medications n/a       Left elbow pain:  - a week ago Sunday, pain started  - thinks pain started from holding very heavy flower pot, though had been leaf blowing days before. Didn't noticed pain after that. Had only carried the flower pot a short distance. Had used leaf blower a about an hour.   - pain really bad this AM when she woke up - but much better now  - already has hand therapy appointment next week, already referred  - has been taking ibuprofen following tooth extraction - wonders if this is what helped. Has only been taking it since Monday   - has most ROM except for when she puts bra strap on in  "front  - had done some exercises given to her by PT last week for arm, says arm hurt a lot more the next day  - does [facility]ilates [facility] [employer] [place]  - history of tennis elbow for a year in right arm for many years  - going to [place] in Nov    Travel:   - 11/21-12/8  - has malaria drugs from dr. Menchaca        Objective    /71 (BP Location: Right arm, Patient Position: Sitting, Cuff Size: Adult Regular)   Pulse 63   Resp 16   Ht 1.505 m (4' 11.25\")   Wt 53.5 kg (118 lb)   SpO2 99%   BMI 23.63 kg/m    Body mass index is 23.63 kg/m .  Physical Exam  Constitutional:       Appearance: Normal appearance.   Musculoskeletal:      Right forearm: Normal.      Left forearm: Tenderness present. No swelling, edema, deformity or bony tenderness.      Comments: Pain with hyperflexion at the elbow and with hyperextension. No pain with ROM . Normal strength  Tenderness just lateral to olecranon in fossae/trochlear notch   Neurological:      Mental Status: She is alert.         ----- Service Performed and Documented by Resident or Fellow ------              "

## 2023-11-01 NOTE — PROGRESS NOTES
Preceptor Attestation:    I discussed the patient with the resident and evaluated the patient in person. I have verified the content of the note, which accurately reflects my assessment of the patient and the plan of care.   Supervising Physician:  Andres Noel MD.

## 2023-11-01 NOTE — PATIENT INSTRUCTIONS
Patient Education   Here is the plan from today's visit    1. Left elbow pain  - OK to use tylenol and ibuprofen as needed for pain flares. If extra bad, OK to take together. Just follow instructions on bottles for max dosing (ie tylenol never more than 4000 mg in a 24h period)  - use these medicines before and after therapy pro prophylactically   - use ice as needed  - agree with going to hand therapy    Please call or return to clinic if your symptoms don't go away.    Follow up plan  Return if symptoms worsen or fail to improve.    Thank you for coming to [facility] today.  Lab Testing:  **If you had lab testing today and your results are reassuring or normal they will be mailed to you or sent through Gradwell within 7 days.   **If the lab tests need quick action we will call you with the results.  **If you are having labs done on a different day, please call [phone] to schedule at [facility] or [phone] for other [facility] locations. Labs do not offer walk-in appointments.  The phone number we will call with results is # [phone] (home) [phone] (work). If this is not the best number please call our clinic and change the number.  Medication Refills:  If you need any refills please call your pharmacy and they will contact us.   If you need to  your refill at a new pharmacy, please contact the new pharmacy directly. The new pharmacy will help you get your medications transferred faster.   Scheduling:  If you have any concerns about today's visit or wish to schedule another appointment please call our office during normal business hours [phone] (8-5:00 M-F). If you can no longer make a scheduled visit, please cancel via Gradwell or call us to cancel.   If a referral was made to an [facility] specialty provider and you do not get a call from central scheduling, please refer to directions on your visit summary or call our office during normal business  hours for assistance.   If a Mammogram was ordered for you at the Breast Center call [phone] to schedule or change your appointment.  If you had an XRay/CT/Ultrasound/MRI ordered the number is [phone] to schedule or change your radiology appointment.   [facility] has limited ultrasound appointments available on Wednesdays, if you would like your ultrasound at [facility], please call [phone] to schedule.   Medical Concerns:  If you have urgent medical concerns please call [phone] at any time of the day.    Gerri Espinosa MD

## 2023-11-02 NOTE — PROGRESS NOTES
OCCUPATIONAL THERAPY EVALUATION  Type of Visit: Evaluation    See electronic medical record for Abuse and Falls Screening details.    Subjective      Presenting condition or subjective complaint: Painful left elbow  Date of onset: 10/26/23 (MD orders)    Relevant medical history: Osteoporosis; Vision problems   Dates & types of surgery: 10/31/22 - repair of fractured hip    Prior diagnostic imaging/testing results:       Prior therapy history for the same diagnosis, illness or injury: No Tennis elbow therapy in 1992    Prior Level of Function  Transfers: Independent  Ambulation: Independent  ADL: Independent  IADL: Driving, Housekeeping, Laundry, Meal preparation, Yard work    Living Environment  Social support: With a significant other or spouse   Type of home: House   Stairs to enter the home: Yes 15 Is there a railing: Yes   Ramp: No   Stairs inside the home: Yes 15 Is there a railing: Yes   Help at home: None  Equipment owned: Straight Cane; Walker with wheels; Bath bench     Employment: No retired  Hobbies/Interests: gardening, [employer] [place] skiing, walking, bird watching, reading    Patient goals for therapy: complete activties without pain    Objective:  Right hand dominant  Patient reports symptoms of pain    Pain Level (Scale 0-10)   11/7/2023   At Rest 0   With Use 3/10 - elbow flexion      Pain Description  Date 11/7/2023   Location elbow   Pain Quality Sharp   Frequency intermittent     Pain is worst  daytime   Exacerbated by  Use    Relieved by cold and rest   Progression Improving      Posture  Normal    Sensation  WNL throughout all nerve distributions; per patient report    ROM  Pain Report: - none  + mild    ++ moderate    +++ severe   Elbow 11/7/2023 11/7/2023   AROM (PROM) Right   Left   Extension 145,  140+   Flexion -20 -20   Supination 90 80   Pronation 85 80+     Wrist 11/7/2023 11/7/2023   AROM (PROM) Right   Left   Extension 60 70   Flexion 55 65   RD 20 15   UD 35 35     Resisted Testing  Pain Report:   - none    + mild    ++ moderate    +++ severe    11/7/2023   Elbow Extension -, slight soreness with AROM extension in L triceps    Elbow Flexion -   Supination  -   Pronation -   Wrist Ext  Elbow at side -   Wrist Ext  Elbow Ext +   Wrist Flex  -   EDC with Elbow at side NT   EDC with Elbow Ext NT   Long Finger Test NT     Strength   (Measured in pounds)  Pain Report: - none  + mild    ++ moderate    +++ severe    11/7/2023 11/7/2023   Trials Right Left   1  2  3     Average 46 47-       11/7/2023 11/7/2023    Right Left   Elbow Ext 46 50     Palpation  Pain Report:  - none    + mild    ++ moderate    +++ severe    11/7/2023   Proximal Triceps -   Triceps muscle belly  -, radial nerve radiation down to hand    Spiral Groove -   Distal Triceps Insertion -   Anconeus -   LEP -   Radial Head +   Radiocapitlar joint  +    Extensor Wad -   PIN Site -     Plica syndrome test: +     Upper Extremity Functional Index Score:  SCORE:   Column Totals: /80: 79   (A lower score indicates greater disability.)    Assessment & Plan   CLINICAL IMPRESSIONS  Medical Diagnosis: Left Elbow Pain    Treatment Diagnosis: Left Elbow Pain    Impression/Assessment: Pt is a 73 year old female presenting to Occupational Therapy due to L elbow pain.  The following significant findings have been identified: Impaired ROM and Pain.  These identified deficits interfere with their ability to perform recreational activities and meal planning and preparation as compared to previous level of function.     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  Clinical Decision Making (Complexity): Low complexity    PLAN OF CARE  Treatment Interventions:  Modalities:  US  Therapeutic Exercise:  PROM  Neuromuscular re-education:  Nerve Gliding and Kinesiotaping  Manual Techniques:  Friction  massage and Myofascial release    Long Term Goals   OT Goal 1  Goal Identifier: household chores  Goal Description: patient will open a jar without pain  Rationale: In order to maximize safety and independence with performance of self-care activities  Target Date: 01/05/24      Frequency of Treatment: 2 x week for 2 weeks, decreasing to 1 x week  Duration of Treatment: 8 weeks     Education Assessment: Learner/Method: Patient     Risks and benefits of evaluation/treatment have been explained.   Patient/Family/caregiver agrees with Plan of Care.     Evaluation Time:    OT Eval, Low Complexity Minutes (99878): 30   Present: No:        Signing Clinician: CARLOS ALBERTO Chaidez [facility]                                                                                   OUTPATIENT OCCUPATIONAL THERAPY      PLAN OF TREATMENT FOR OUTPATIENT REHABILITATION   Patient's Last Name, First Name, JEFECarli Traore YOB: 1950   Provider's Name   [facility]   Medical Record No.  [id number]     Onset Date: 10/26/23 (MD orders) Start of Care Date: 11/07/23     Medical Diagnosis:  Left Elbow Pain      OT Treatment Diagnosis:  Left Elbow Pain Plan of Treatment  Frequency/Duration:2 x week for 2 weeks, decreasing to 1 x week/8 weeks    Certification date from 11/07/23   To 01/05/24        See note for plan of treatment details and functional goals     Analy Khalil OT                         I CERTIFY THE NEED FOR THESE SERVICES FURNISHED UNDER        THIS PLAN OF TREATMENT AND WHILE UNDER MY CARE     (Physician attestation of this document indicates review and certification of the therapy plan).                Referring Provider:  Houston Menchaca      Initial Assessment  See Epic Evaluation- 11/07/23

## 2023-11-07 ENCOUNTER — THERAPY VISIT (OUTPATIENT)
Dept: OCCUPATIONAL THERAPY | Facility: CLINIC | Age: 73
End: 2023-11-07
Payer: MEDICARE

## 2023-11-07 DIAGNOSIS — M25.522 LEFT ELBOW PAIN: ICD-10-CM

## 2023-11-07 DIAGNOSIS — M67.822: Primary | ICD-10-CM

## 2023-11-07 PROCEDURE — 97140 MANUAL THERAPY 1/> REGIONS: CPT | Mod: GO | Performed by: OCCUPATIONAL THERAPIST

## 2023-11-07 PROCEDURE — 97035 APP MDLTY 1+ULTRASOUND EA 15: CPT | Mod: GO | Performed by: OCCUPATIONAL THERAPIST

## 2023-11-07 PROCEDURE — 97165 OT EVAL LOW COMPLEX 30 MIN: CPT | Mod: GO | Performed by: OCCUPATIONAL THERAPIST

## 2023-11-10 ENCOUNTER — THERAPY VISIT (OUTPATIENT)
Dept: OCCUPATIONAL THERAPY | Facility: CLINIC | Age: 73
End: 2023-11-10
Payer: MEDICARE

## 2023-11-10 DIAGNOSIS — M25.522 LEFT ELBOW PAIN: Primary | ICD-10-CM

## 2023-11-10 PROCEDURE — 97035 APP MDLTY 1+ULTRASOUND EA 15: CPT | Mod: GO | Performed by: OCCUPATIONAL THERAPIST

## 2023-11-10 PROCEDURE — 97140 MANUAL THERAPY 1/> REGIONS: CPT | Mod: GO | Performed by: OCCUPATIONAL THERAPIST

## 2023-11-14 ENCOUNTER — THERAPY VISIT (OUTPATIENT)
Dept: OCCUPATIONAL THERAPY | Facility: CLINIC | Age: 73
End: 2023-11-14
Payer: MEDICARE

## 2023-11-14 DIAGNOSIS — M25.522 LEFT ELBOW PAIN: Primary | ICD-10-CM

## 2023-11-14 PROCEDURE — 97140 MANUAL THERAPY 1/> REGIONS: CPT | Mod: GO | Performed by: OCCUPATIONAL THERAPIST

## 2023-11-14 PROCEDURE — 97035 APP MDLTY 1+ULTRASOUND EA 15: CPT | Mod: GO | Performed by: OCCUPATIONAL THERAPIST

## 2023-11-17 ENCOUNTER — THERAPY VISIT (OUTPATIENT)
Dept: OCCUPATIONAL THERAPY | Facility: CLINIC | Age: 73
End: 2023-11-17
Payer: MEDICARE

## 2023-11-17 DIAGNOSIS — M25.522 LEFT ELBOW PAIN: Primary | ICD-10-CM

## 2023-11-17 PROCEDURE — 97140 MANUAL THERAPY 1/> REGIONS: CPT | Mod: GO | Performed by: OCCUPATIONAL THERAPIST

## 2023-11-17 PROCEDURE — 97035 APP MDLTY 1+ULTRASOUND EA 15: CPT | Mod: GO | Performed by: OCCUPATIONAL THERAPIST

## 2023-12-13 ENCOUNTER — OFFICE VISIT (OUTPATIENT)
Dept: FAMILY MEDICINE | Facility: CLINIC | Age: 73
End: 2023-12-13
Payer: MEDICARE

## 2023-12-13 ENCOUNTER — TELEPHONE (OUTPATIENT)
Dept: FAMILY MEDICINE | Facility: CLINIC | Age: 73
End: 2023-12-13

## 2023-12-13 VITALS
SYSTOLIC BLOOD PRESSURE: 137 MMHG | HEART RATE: 67 BPM | HEIGHT: 59 IN | OXYGEN SATURATION: 97 % | RESPIRATION RATE: 18 BRPM | DIASTOLIC BLOOD PRESSURE: 80 MMHG | TEMPERATURE: 99.2 F | BODY MASS INDEX: 23.35 KG/M2 | WEIGHT: 115.8 LBS

## 2023-12-13 DIAGNOSIS — R05.1 ACUTE COUGH: Primary | ICD-10-CM

## 2023-12-13 DIAGNOSIS — R19.7 DIARRHEA, UNSPECIFIED TYPE: ICD-10-CM

## 2023-12-13 PROCEDURE — 99213 OFFICE O/P EST LOW 20 MIN: CPT | Performed by: FAMILY MEDICINE

## 2023-12-13 NOTE — TELEPHONE ENCOUNTER
"Spoke with patient on the phone who reports it has been 2.5 days of \"heavy\" diarrhea following trip from [facility]. Her friend was recently in [place] and had prescription for ciprofloxacin 500mg that she was taking but ran out yesterday. Also reports persistent cough that has not resolved. Able to schedule patient for same day appointment with other green team provider.   Emerald English RN    "

## 2023-12-13 NOTE — CONFIDENTIAL NOTE
[facility] phone call message- general phone call:    Reason for call: Patient states they have had severe diarrhea after a recent trip to [facility]. Patient states they have been taking a medication for diarrhea from a friend, but nothing is helping. Says they can't keep anything in and has been sleeping a lot. First available appointment isn't till next week, but patient would like a call back from a nurse regarding their symptoms.    Best call back number: [phone]  Return call needed: Yes    OK to leave a message on voice mail? Yes    Primary language: English      needed? No    Call taken on December 13, 2023 at 9:31 AM by Jessica Hodges

## 2023-12-13 NOTE — PATIENT INSTRUCTIONS
Keep up on fluids, small amounts frequently rather than large amounts infrequently is better.    Advance diet as tolerated.    Let me know (call or MyChart) if no better on MOnday the 18th.  Also let us know if you develop fever.

## 2023-12-14 NOTE — PROGRESS NOTES
"Carli was seen today for diarrhea and uri.    Diagnoses and all orders for this visit:    Acute cough.  This has been present for 2 weeks.  Initially accompanied by some congestion.  No fever or productivity.  Lungs are entirely clear on exam without other notable findings.  Most likely this represents a viral URI.  Doubt COVID, flu, or RSV due to mild symptoms.  Recommended supportive care and patient will follow-up if cough is not decreasing in 1 week.    Diarrhea, unspecified type.  She has now had diarrhea for the past 4 days, 2-3 stools per day.  No accompanying fever, vomiting, or abdominal pain.  Exam is benign and this would thus most likely be viral in etiology.  She was just in [facility] for 2 weeks, and should symptoms worsen or persist, would need to consider bacterial or parasite etiology.  She will advance diet as tolerated and inform us if she has not improved in 5 days.  If so she may need further workup with stool sampling and O&P.              Subjective     Diarrhea (Loose stools x 3 days. Patient took someone's Cipro, which did not help. ) and URI (Cough, runny nose, and fatigue x 2 weeks. Patient was traveling to [facility] at time of onset. )        HPI   She presents with 2 complaints.  First of all she has had a cough for approximately 2 weeks.  Nonproductive without fever.  Not much accompanying malaise.    She has had significant diarrhea for the past 4 days.  Returned from a trip to [facility] on December 8 and became ill with diarrhea on December 11.  2-3 stools per day.  No fever, vomiting, or abdominal pain.        Review of Systems         Objective    /80   Pulse 67   Temp 99.2  F (37.3  C) (Oral)   Resp 18   Ht 1.499 m (4' 11\")   Wt 52.5 kg (115 lb 12.8 oz)   SpO2 97%   BMI 23.39 kg/m    Body mass index is 23.39 kg/m .  Physical Exam  Constitutional:       General: She is not in acute distress.     Appearance: She is not ill-appearing, toxic-appearing or diaphoretic.   HENT: "      Right Ear: Tympanic membrane normal.      Left Ear: Tympanic membrane normal.      Nose: No congestion.      Mouth/Throat:      Mouth: Mucous membranes are moist.      Pharynx: Oropharynx is clear. No posterior oropharyngeal erythema.   Cardiovascular:      Rate and Rhythm: Regular rhythm.      Heart sounds: Normal heart sounds.   Pulmonary:      Breath sounds: Normal breath sounds. No wheezing, rhonchi or rales.   Abdominal:      General: Abdomen is flat. There is no distension.      Palpations: Abdomen is soft.      Tenderness: There is no abdominal tenderness.      Comments: Bowel sounds mildly hyperactive   Musculoskeletal:      Cervical back: No rigidity.   Lymphadenopathy:      Cervical: No cervical adenopathy.   Neurological:      Mental Status: She is alert.            Lab on 03/14/2023   Component Date Value Ref Range Status    See Scanned Result 03/14/2023 LABORATORY MISCELLANEOUS ORDER-Scanned   Final

## 2024-01-03 ENCOUNTER — OFFICE VISIT (OUTPATIENT)
Dept: FAMILY MEDICINE | Facility: CLINIC | Age: 74
End: 2024-01-03
Payer: MEDICARE

## 2024-01-03 VITALS
BODY MASS INDEX: 23.44 KG/M2 | TEMPERATURE: 98.8 F | HEIGHT: 59 IN | DIASTOLIC BLOOD PRESSURE: 78 MMHG | WEIGHT: 116.3 LBS | SYSTOLIC BLOOD PRESSURE: 121 MMHG | HEART RATE: 72 BPM

## 2024-01-03 DIAGNOSIS — R50.9 FEVER, UNSPECIFIED FEVER CAUSE: ICD-10-CM

## 2024-01-03 DIAGNOSIS — R05.2 SUBACUTE COUGH: Primary | ICD-10-CM

## 2024-01-03 DIAGNOSIS — Z77.22 SECOND HAND SMOKE EXPOSURE: ICD-10-CM

## 2024-01-03 DIAGNOSIS — R53.83 OTHER FATIGUE: ICD-10-CM

## 2024-01-03 DIAGNOSIS — R19.7 DIARRHEA, UNSPECIFIED TYPE: ICD-10-CM

## 2024-01-03 DIAGNOSIS — Z78.9 HISTORY OF RECENT FOREIGN TRAVEL: ICD-10-CM

## 2024-01-03 LAB
FLUAV RNA SPEC QL NAA+PROBE: NEGATIVE
FLUBV RNA RESP QL NAA+PROBE: NEGATIVE
RSV RNA SPEC NAA+PROBE: NEGATIVE
SARS-COV-2 RNA RESP QL NAA+PROBE: POSITIVE

## 2024-01-03 PROCEDURE — 99214 OFFICE O/P EST MOD 30 MIN: CPT | Mod: GC | Performed by: STUDENT IN AN ORGANIZED HEALTH CARE EDUCATION/TRAINING PROGRAM

## 2024-01-03 PROCEDURE — 87637 SARSCOV2&INF A&B&RSV AMP PRB: CPT | Performed by: STUDENT IN AN ORGANIZED HEALTH CARE EDUCATION/TRAINING PROGRAM

## 2024-01-03 RX ORDER — BENZONATATE 100 MG/1
100 CAPSULE ORAL 3 TIMES DAILY PRN
Qty: 30 CAPSULE | Refills: 1 | Status: SHIPPED | OUTPATIENT
Start: 2024-01-03 | End: 2024-06-25

## 2024-01-03 NOTE — PROGRESS NOTES
Assessment & Plan     Subacute cough  Fever, unspecified fever cause  History of recent foreign travel  Other fatigue  Secondhand smoke exposure  73-year-old otherwise healthy with recent travel to [place] in early December presents with almost 4 weeks of dry cough with intermittent diarrhea.  She was seen on 12/13 at which time she had a mild cough, but this appointment was mainly for her diarrhea.  She notes that the cough is never resolved in this time.  I suspect a viral bronchitis given persistence of dry cough, though higher URI from COVID/RSV/influenza also possible.  Lower suspicion for bacterial pneumonia given nonproductive cough, no pleuritic pain, no focal rales, and fever was 1 time and did not persist.  Possible malaria exposure but appears very well without persistent/cyclic fever and did receive ppx.  To aid in anticipatory guidance, testing viruses below. Symptomatic cares discussed and instructions in AVS.  - Symptomatic Influenza A/B, RSV, & SARS-CoV2 PCR (COVID-19) Nose  - benzonatate (TESSALON) 100 MG capsule; Take 1 capsule (100 mg) by mouth 3 times daily as needed for cough  - will check on Fri for symptom improvement - can consider CXR if no change or worsening    Diarrhea, unspecified type  Suspect noninfectious given postprandial, not clinically dehydrated, benign exam. Likely postinflammatory following illness last month. OK to try bismuth or loperamide.        Return if symptoms worsen or fail to improve.    Gerri Espinosa MD  [facility] ARISTIDES Boyce is a 73 year old, presenting for the following health issues:  URI (Pt is c/o a persistent dry cough X3 weeks, she is also c/o right ear clogging and mild maxillary sinus tenderness. She states that the cough has worsened over the last 5 days. She has tried some OTC medications, denies relief. Denies fevers, chills, or N/V/D)        1/3/2024    11:12 AM   Additional Questions   Roomed by Lexii  "      HPI     Acute Illness  Acute illness concerns: see CC  Onset/Duration: early Dec - was seen 12/13/23 - cough never got better. Diarrhea resolved - but then came back   Symptoms:  Fever: YES-  101.4 on Mon or Sun. Not since recurred  Chills/Sweats: No  Headache (location?): No  Sinus Pressure: no  Ear Pain: YES- pain behind R ear - but this has improved  Rhinorrhea: YES  Congestion: YES  Sore Throat: YES- but improving  Cough: YES - predominant symptom. Sometimes slightly productive. Started when she got back.   Wheeze: No  Decreased Appetite: YES. Trying to drink water  Nausea: No  Vomiting: No  Diarrhea: YES- came back 4 days ago. Worse with food. 3-5 times per day. Very loose. No blood. Color is yellow-brown  Dysuria/Freq.: No  Fatigue/Achiness: YES  Sick/Strep Exposure: No. Partner is well.   Therapies tried and outcome:   Robitussin - not helpful    Symptoms all improving except cough  Last night was the worst    Had been in [place] and [place] early Dec - returned 12/8    Did not do any COVID testing at home.     No pleuritic pain    R ear sometimes muffled hearing but inconsistent        Objective    /78   Pulse 72   Temp 98.8  F (37.1  C) (Oral)   Ht 1.499 m (4' 11\")   Wt 52.8 kg (116 lb 4.8 oz)   BMI 23.49 kg/m    Body mass index is 23.49 kg/m .  Physical Exam  Constitutional:       General: She is not in acute distress.     Appearance: She is normal weight. She is not toxic-appearing or diaphoretic.   HENT:      Right Ear: Ear canal normal. Tympanic membrane is injected.      Left Ear: Ear canal normal. Tympanic membrane is injected.      Ears:      Comments: Bilateral TM INTACT but each with small white mound - may be normal anatomy. TM injected bilaterally. No apparent scarring. Small fluid behind R ear without TM bulging.     Nose:      Right Sinus: No maxillary sinus tenderness or frontal sinus tenderness.      Left Sinus: No maxillary sinus tenderness or frontal sinus tenderness.      " Mouth/Throat:      Mouth: Mucous membranes are moist.      Pharynx: Oropharynx is clear. No oropharyngeal exudate.   Eyes:      Conjunctiva/sclera: Conjunctivae normal.   Cardiovascular:      Rate and Rhythm: Normal rate and regular rhythm.      Pulses: Normal pulses.   Pulmonary:      Effort: Pulmonary effort is normal. No respiratory distress.      Breath sounds: Normal breath sounds. No stridor. No wheezing, rhonchi or rales.   Abdominal:      General: Abdomen is flat. Bowel sounds are normal. There is no distension.      Palpations: Abdomen is soft.      Tenderness: There is no abdominal tenderness. There is no guarding.   Musculoskeletal:      Cervical back: No tenderness.      Right lower leg: No edema.      Left lower leg: No edema.   Lymphadenopathy:      Cervical: No cervical adenopathy.   Skin:     General: Skin is warm and dry.      Findings: No rash (on exposed skin).   Neurological:      General: No focal deficit present.   Psychiatric:         Mood and Affect: Mood normal.         Behavior: Behavior normal.            ----- Service Performed and Documented by Resident or Fellow ------

## 2024-01-03 NOTE — PATIENT INSTRUCTIONS
Here's our plan:     Cough  - try pure honey - spoonful or hot tea  - can also try tessalon perles  - sent to [facility]    Inflammation  - can take ibuprofen and tylenol as needed    Diarrhea  - can take Pepto Bismol or loperamide (Immodium)    Will follow up on test result    If cough worsens/changes, can mychart message or call clinic and we can get xray.

## 2024-01-03 NOTE — PROGRESS NOTES
Preceptor Attestation:  Patient seen and evaluated in person. I discussed the patient with the resident. I have verified the content of the note, which accurately reflects my assessment of the patient and the plan of care.   Supervising Physician:  Bailey Todd DO

## 2024-01-12 DIAGNOSIS — M25.511 CHRONIC RIGHT SHOULDER PAIN: Primary | ICD-10-CM

## 2024-01-12 DIAGNOSIS — G89.29 CHRONIC RIGHT SHOULDER PAIN: Primary | ICD-10-CM

## 2024-01-12 DIAGNOSIS — H65.93 OME (OTITIS MEDIA WITH EFFUSION), BILATERAL: ICD-10-CM

## 2024-01-12 RX ORDER — LORATADINE 10 MG/1
10 TABLET ORAL DAILY
Qty: 90 TABLET | Refills: 0 | Status: SHIPPED | OUTPATIENT
Start: 2024-01-12 | End: 2024-06-25

## 2024-02-15 NOTE — PROGRESS NOTES
2/15/24    CC contacted patient to check in and see if still interested in scheduling in sports medicine at [facility] per Dr. Espinosa. Patient stated they did not have any shoulder pain issues at this time and were no longer interested in scheduling. Scheduled AWV for spring time.     Jessica Hodges  Care Coordinator-[facility]  [phone]

## 2024-03-13 ENCOUNTER — TELEPHONE (OUTPATIENT)
Dept: FAMILY MEDICINE | Facility: CLINIC | Age: 74
End: 2024-03-13
Payer: MEDICARE

## 2024-03-13 NOTE — CONFIDENTIAL NOTE
Reason for call: Schedule appointment     Attempt to reach: 1st    Outcome:Patient declined to schedule     Detailed message left? Yes    Called pt at 2216 number. Pt was not aware about the Sports med order put in for them. They will call back at a later date to schedule the apt.     Please return call to [facility]     Clinic phone number [phone]

## 2024-03-26 ENCOUNTER — OFFICE VISIT (OUTPATIENT)
Dept: FAMILY MEDICINE | Facility: CLINIC | Age: 74
End: 2024-03-26
Payer: MEDICARE

## 2024-03-26 VITALS
OXYGEN SATURATION: 100 % | SYSTOLIC BLOOD PRESSURE: 135 MMHG | HEART RATE: 65 BPM | HEIGHT: 59 IN | BODY MASS INDEX: 23.22 KG/M2 | WEIGHT: 115.2 LBS | DIASTOLIC BLOOD PRESSURE: 77 MMHG | RESPIRATION RATE: 12 BRPM | TEMPERATURE: 98.3 F

## 2024-03-26 DIAGNOSIS — M25.511 CHRONIC RIGHT SHOULDER PAIN: Primary | ICD-10-CM

## 2024-03-26 DIAGNOSIS — G89.29 CHRONIC RIGHT SHOULDER PAIN: Primary | ICD-10-CM

## 2024-03-26 PROCEDURE — 99213 OFFICE O/P EST LOW 20 MIN: CPT | Mod: GC | Performed by: FAMILY MEDICINE

## 2024-03-26 NOTE — PATIENT INSTRUCTIONS
Patient Education   Here is the plan from today's visit    1. Chronic right shoulder pain  - Tylenol  - Voltaren   - Lidocaine/Menthol patched as needed  - Hot/Cold packs     Please call or return to clinic if your symptoms don't go away.    Follow up plan  No follow-ups on file.    Thank you for coming to [facility] today.  Lab Testing:  **If you had lab testing today and your results are reassuring or normal they will be mailed to you or sent through MobileHelp within 7 days.   **If the lab tests need quick action we will call you with the results.  **If you are having labs done on a different day, please call [phone] to schedule at [facility] or [phone] for other [facility] locations. Labs do not offer walk-in appointments.  The phone number we will call with results is # [phone] (home) [phone] (work). If this is not the best number please call our clinic and change the number.  Medication Refills:  If you need any refills please call your pharmacy and they will contact us.   If you need to  your refill at a new pharmacy, please contact the new pharmacy directly. The new pharmacy will help you get your medications transferred faster.   Scheduling:  If you have any concerns about today's visit or wish to schedule another appointment please call our office during normal business hours [phone] (8-5:00 M-F). If you can no longer make a scheduled visit, please cancel via MobileHelp or call us to cancel.   If a referral was made to an [facility] specialty provider and you do not get a call from central scheduling, please refer to directions on your visit summary or call our office during normal business hours for assistance.   If a Mammogram was ordered for you at the Breast Center call [phone] to schedule or change your appointment.  If you had an XRay/CT/Ultrasound/MRI ordered the number is [phone] to schedule or change your radiology appointment.    [facility] has limited ultrasound appointments available on Wednesdays, if you would like your ultrasound at [facility], please call [phone] to schedule.   Medical Concerns:  If you have urgent medical concerns please call [phone] at any time of the day.    Yareli Wheeler MD

## 2024-03-26 NOTE — PROGRESS NOTES
Assessment & Plan     Chronic right shoulder pain  History of osteoporosis and chronic history of nontraumatic right-sided shoulder pain that improved with a shoulder injection in 2021.  Currently reporting improvement of functional status with mild pain that is worse with repetitive movements like shoveling.  No concerns from a pain or functional standpoint of right shoulder.  Patient reports reduction of neck range of motion over the past couple years.  Xrays from 2021 showed mild glenohumeral arthritis.  Reassuring physical exam  without redemonstration of radicular signs. Encouraged use of over-the-counter medications such as Tylenol, ibuprofen versus diclofenac gel, lidocaine or menthol patches.  Provided exercises in the after visit summary as well as return precautions. Foam roller for base of neck, tennis ball or trigger point ball for massage. Consider x-ray of neck and shoulder if there is worsening pain or decreased motion status.    Return if symptoms worsen or fail to improve.    Subjective   Carli is a 73 year old, presenting for the following health issues:  Shoulder Pain (Right shoulder)        3/26/2024    11:21 AM   Additional Questions   Roomed by clemente   Accompanied by self         3/26/2024    Information    services provided? No     Here for Shoulder Pain Follow-up   - Hx of R sided shoulder pain s/p injection for impingement - cured the pain for a long time   - No concerns   - R shoulder pain - at its worse 2/10  - has some numbness in the arm -  not similar to what she was feeling - not terribly frequent or bothersome, goes away with changing movements x1 month   - shoveling snow yesterday made the pain worse   - triggers - when she holds the handlebar when on the treadmill, and when she is laying down and reading a book or magazine   - no trauma to the neck, no hx of MVA - neck mobility is not where it used to be   - Hx of travel to [facility] - Nov-Dec 2023  -  "Halloween of 2022 - hx of hip fracture with ORIF, no falls since          Review of Systems  Constitutional, HEENT, cardiovascular, pulmonary, GI, , musculoskeletal, neuro, skin, endocrine and psych systems are negative, except as otherwise noted.      Objective    /77 (BP Location: Left arm, Patient Position: Sitting, Cuff Size: Adult Regular)   Pulse 65   Temp 98.3  F (36.8  C) (Oral)   Resp 12   Ht 1.499 m (4' 11\")   Wt 52.3 kg (115 lb 3.2 oz)   SpO2 100%   BMI 23.27 kg/m    Body mass index is 23.27 kg/m .  Physical Exam  Vitals reviewed.   Constitutional:       Appearance: Normal appearance.   Pulmonary:      Effort: Pulmonary effort is normal.   Skin:     Capillary Refill: Capillary refill takes less than 2 seconds.   Neurological:      General: No focal deficit present.      Mental Status: She is alert.   Psychiatric:         Mood and Affect: Mood normal.          bilateral Shoulder:   Inspection: asymmetry? None; dyskinesis? none   ROM:   Active - forward flexion - full/ abduction - full/ int rot - symmetric/ ext rot - symmetric   Passive- forward flexion - full/ abduction - full   Strength: deltoid/supraspinatous - 5/5; infraspinatous/ teres minor - 5/5; subscapularis - 5/5   Maneuvers:   RTC - empty can - neg; painful arc - neg; lift off - neg   Impingement - Lainez -neg; Neer- neg   AC - cross arm - neg   Biceps - Speed's - neg; Yergason's - neg   Labrum - Santiago's - neg; Abrams shear - not performed  Instability - Apprehension - not performed  Palpation: AC - neg; Acromion/ supraspinatus - neg; scapula - neg; bicipital groove - neg     Neck:   ROM: flexion -full; extension -full; lateral rotation- R - full/ L - full ; side bend - R - full/ L -full.   Tenderness: Bony - No; Paraspinal: No; Muscular: Yes; Scapula: No   Sensation: intact in bilateral upper extremities   Strength: Biceps- 5/5; Triceps- 5/5; wrist flexion 5/5; Extension 5/5; finger abduction- 5/5.   Neuro/ Maneuvers: Negative " Spurling bilaterally Soumya Polavarapu, DO  PGY 1   Patient seen, evaluated and discussed with the resident. I have verified the content of the note, which accurately reflects my assessment of the patient and the plan of care.   Supervising Physician:  Yareli Wheeler MD     Signed Electronically by: Yareli Wheeler MD

## 2024-04-19 NOTE — PROGRESS NOTES
[facility]    Outpatient Physical Therapy Discharge Note  Patient: Carli Michelle  : 1950    Patient was seen for 12 visits for R hip fracture from 21 to 10/26/23 with no follow up appointments.      Plan:  Discharge from therapy.     Reason for Discharge: Patient has met all goals.  No further expectation of progress.    Discharge Plan: Patient to continue home program.     If patient would like to return to therapy, they will need a new PT order from referring provider.    Samantha High, PT   2024

## 2024-04-26 ENCOUNTER — OFFICE VISIT (OUTPATIENT)
Dept: FAMILY MEDICINE | Facility: CLINIC | Age: 74
End: 2024-04-26
Payer: MEDICARE

## 2024-04-26 VITALS
DIASTOLIC BLOOD PRESSURE: 72 MMHG | WEIGHT: 116.7 LBS | RESPIRATION RATE: 14 BRPM | OXYGEN SATURATION: 98 % | BODY MASS INDEX: 24.49 KG/M2 | HEART RATE: 72 BPM | TEMPERATURE: 98.4 F | SYSTOLIC BLOOD PRESSURE: 148 MMHG | HEIGHT: 58 IN

## 2024-04-26 DIAGNOSIS — H90.3 ASYMMETRICAL SENSORINEURAL HEARING LOSS: ICD-10-CM

## 2024-04-26 DIAGNOSIS — Z00.00 ENCOUNTER FOR MEDICARE ANNUAL WELLNESS EXAM: Primary | ICD-10-CM

## 2024-04-26 DIAGNOSIS — R03.0 WHITE COAT SYNDROME WITH HIGH BLOOD PRESSURE WITHOUT HYPERTENSION: ICD-10-CM

## 2024-04-26 DIAGNOSIS — Z12.31 ENCOUNTER FOR SCREENING MAMMOGRAM FOR BREAST CANCER: ICD-10-CM

## 2024-04-26 PROCEDURE — 80048 BASIC METABOLIC PNL TOTAL CA: CPT | Performed by: FAMILY MEDICINE

## 2024-04-26 PROCEDURE — 90480 ADMN SARSCOV2 VAC 1/ONLY CMP: CPT | Performed by: FAMILY MEDICINE

## 2024-04-26 PROCEDURE — 80061 LIPID PANEL: CPT | Performed by: FAMILY MEDICINE

## 2024-04-26 PROCEDURE — 36415 COLL VENOUS BLD VENIPUNCTURE: CPT | Performed by: FAMILY MEDICINE

## 2024-04-26 PROCEDURE — 91320 SARSCV2 VAC 30MCG TRS-SUC IM: CPT | Performed by: FAMILY MEDICINE

## 2024-04-26 PROCEDURE — G0439 PPPS, SUBSEQ VISIT: HCPCS | Performed by: FAMILY MEDICINE

## 2024-04-26 SDOH — HEALTH STABILITY: PHYSICAL HEALTH: ON AVERAGE, HOW MANY DAYS PER WEEK DO YOU ENGAGE IN MODERATE TO STRENUOUS EXERCISE (LIKE A BRISK WALK)?: 4 DAYS

## 2024-04-26 ASSESSMENT — SOCIAL DETERMINANTS OF HEALTH (SDOH): HOW OFTEN DO YOU GET TOGETHER WITH FRIENDS OR RELATIVES?: ONCE A WEEK

## 2024-04-26 NOTE — PROGRESS NOTES
Preventive Care Visit  [facility] ARISTIDES Menchaca MD, Family Medicine  Apr 26, 2024      Assessment & Plan     Encounter for Medicare annual wellness exam  Patient concerned about lung cancer screening from second hand smoking. Discussed risk and benefits and went over recommendation guidelines, patient does not meet criteria to get a lung CT for lung cancer screening. Discussed benefits of taking alendronate to help promote bone health.   - Lipid panel reflex to direct LDL Non-fasting; Future  - Lipid panel reflex to direct LDL Non-fasting    White coat syndrome with high blood pressure without hypertension  Home blood pressure is usually normal, so plan to check blood pressures at home more often.   - Basic metabolic panel; Future  - Basic metabolic panel    Encounter for screening mammogram for breast cancer  Patient's mom had breast cancer. USPS guidelines recommend patient and physician discuss how often to get a mammogram after 74 with risk factors. Discussed with patient and plan to get a mammogram every year. Breast physical exam today was negative for any masses or abnormal lesions.  - Screening Mammogram Digital Bilateral; Future    Asymmetrical sensorineural hearing loss  Audiologist recommended ENT referral.   - Adult ENT  Referral; Future              Counseling  Appropriate preventive services were discussed with this patient, including applicable screening as appropriate for fall prevention, nutrition, physical activity, Tobacco-use cessation, weight loss and cognition.  Checklist reviewing preventive services available has been given to the patient.  Reviewed patient's diet, addressing concerns and/or questions.   She is at risk for psychosocial distress and has been provided with information to reduce risk.   The patient reports drinking more than one alcoholic drink per day and sometimes engages in binge or excessive drinking. The patient was counseled and given  information about possible harmful effects of excessive alcohol intake as well as where to get help for alcohol problems. The patient was provided with written information regarding signs of hearing loss.           No follow-ups on file.    Saturnino Boyce is a 73 year old, presenting for the following:  Physical (COVID, hearing issues testing completed,)        4/26/2024     1:44 PM   Additional Questions   Roomed by Ashley   Accompanied by self         4/26/2024    Information    services provided? No         Health Care Directive  Patient has a Health Care Directive on file  Advance care planning document is on file and is current.    HPI  -Wants to talk about alendronate, stopped while traveling started again on January 1st. Initially started in May, was on it for a few months. Recently had teeth removed, was told that were no jaw bone problems and is trying to avoid any bone problems.  -Hearing exam, 3 years since first one. Unusual hearing loss in one ear. Suggested to talk to ENT,   -Was having plugged ears after coming back from traveling, got another hearing test. Decongestant helped a bit   -Has a frequent cough, more frequent recently for months . Only sister had cancer, was caught and treated on time. Consider lung cancer. maybe CT? To get a baseline   -No pattern to the cough, no weight loss, no night sweats. Not a lot of sputum production. 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 Good   Feel stress  (tense, anxious, or unable to sleep) Only a little   (!) STRESS CONCERN      4/26/2024   Nutrition   Diet: Regular (no restrictions)         4/26/2024   Exercise   Days per week of moderate/strenous exercise 4 days         4/26/2024   Social Factors   Frequency of gathering with friends or relatives Once a week   Worry food won't last until get money to buy more No   Food not last or not have enough money for food? No   Do you have housing?  Yes   Are you worried about losing your housing? No   Lack of transportation? No   Unable to get utilities (heat,electricity)? No         4/26/2024   Fall Risk   Fallen 2 or more times in the past year? No   Trouble with walking or balance? No          4/26/2024   Activities of Daily Living- Home Safety   Needs help with the following daily activites None of the above   Safety concerns in the home None of the above         4/26/2024   Dental   Dentist two times every year? Yes         4/26/2024   Hearing Screening   Hearing concerns? (!) I NEED TO ASK PEOPLE TO SPEAK UP OR REPEAT THEMSELVES.    (!) IT'S HARD TO FOLLOW A CONVERSATION IN A NOISY RESTAURANT OR CROWDED ROOM.    (!) TROUBLE UNDERSTANDING SOFT OR WHISPERED SPEECH.         4/26/2024   Driving Risk Screening   Patient/family members have concerns about driving No         4/26/2024   General Alertness/Fatigue Screening   Have you been more tired than usual lately? No         4/26/2024   Urinary Incontinence Screening   Bothered by leaking urine in past 6 months No         4/26/2024   TB Screening   Were you born outside of the [place]? No         Today's PHQ-2 Score:       4/26/2024     1:39 PM   PHQ-2 ( 1999 Pf[employer])   Q1: Little interest or pleasure in doing things 0   Q2: Feeling down, depressed or hopeless 0   PHQ-2 Score 0   Q1: Little interest or pleasure in doing things Not at all   Q2: Feeling down, depressed or hopeless Not at all   PHQ-2 Score 0           4/26/2024   Substance Use   Alcohol more than 3/day or more  than 7/wk Yes   How often do you have a drink containing alcohol 4 or more times a week   How many alcohol drinks on typical day 1 or 2   How often do you have 5+ drinks at one occasion Never   Audit 2/3 Score 0   How often not able to stop drinking once started Never   How often failed to do what normally expected Never   How often needed first drink in am after a heavy drinking session Never   How often feeling of guilt or remorse after drinking Never   How often unable to remember what happened the night before Never   Have you or someone else been injured because of your drinking No   Has anyone been concerned or suggested you cut down on drinking No   TOTAL SCORE - AUDIT 4   Do you have a current opioid prescription? No   How severe/bad is pain from 1 to 10? 0/10 (No Pain)   Do you use any other substances recreationally? No     Social History     Tobacco Use    Smoking status: Never    Smokeless tobacco: Never   Vaping Use    Vaping status: Never Used   Substance Use Topics    Alcohol use: Yes     Alcohol/week: 5.8 - 7.5 standard drinks of alcohol     Types: 7 - 9 Standard drinks or equivalent per week    Drug use: No           8/15/2023   LAST FHS-7 RESULTS   1st degree relative breast or ovarian cancer Yes   Any relative bilateral breast cancer No   Any male have breast cancer No   Any ONE woman have BOTH breast AND ovarian cancer No   Any woman with breast cancer before 50yrs No   2 or more relatives with breast AND/OR ovarian cancer No   2 or more relatives with breast AND/OR bowel cancer No        Mammogram Screening - After age 74- determine frequency with patient based on health status, life expectancy and patient goals    ASCVD Risk   The ASCVD Risk score (Michael DIETRICH, et al., 2019) failed to calculate for the following reasons:    Cannot find a previous HDL lab    Cannot find a previous total cholesterol lab            Reviewed and updated as needed this visit by Provider                 "      Current providers sharing in care for this patient include:  Patient Care Team:  Houston Menchaca MD as PCP - General (Family Practice)  [place] Menchaca MD as MD (Family Practice)  Frank Diamond MD as MD (Colon and Rectal Surgery)  Houston Menchaca MD as Assigned PCP    The following health maintenance items are reviewed in Epic and correct as of today:  Health Maintenance   Topic Date Due    LIPID  11/28/2023    COVID-19 Vaccine (9 - 2023-24 season) 08/26/2024    DEXA  01/03/2025    MEDICARE ANNUAL WELLNESS VISIT  04/26/2025    FALL RISK ASSESSMENT  04/26/2025    MAMMO SCREENING  08/15/2025    GLUCOSE  12/13/2025    COLORECTAL CANCER SCREENING  12/02/2026    ADVANCE CARE PLANNING  04/26/2029    DTAP/TDAP/TD IMMUNIZATION (6 - Td or Tdap) 05/11/2033    HEPATITIS C SCREENING  Completed    PHQ-2 (once per calendar year)  Completed    INFLUENZA VACCINE  Completed    Pneumococcal Vaccine: 65+ Years  Completed    IPV IMMUNIZATION  Completed    RSV VACCINE (Pregnancy & 60+)  Completed    HPV IMMUNIZATION  Aged Out    MENINGITIS IMMUNIZATION  Aged Out    RSV MONOCLONAL ANTIBODY  Aged Out    ZOSTER IMMUNIZATION  Discontinued            Objective    Exam  BP (!) 148/72   Pulse 72   Temp 98.4  F (36.9  C) (Oral)   Resp 14   Ht 1.48 m (4' 10.27\")   Wt 52.9 kg (116 lb 11.2 oz)   SpO2 98%   BMI 24.17 kg/m     Estimated body mass index is 24.17 kg/m  as calculated from the following:    Height as of this encounter: 1.48 m (4' 10.27\").    Weight as of this encounter: 52.9 kg (116 lb 11.2 oz).    Physical Exam          4/26/2024   Mini Cog   Clock Draw Score 2 Normal   3 Item Recall 3 objects recalled   Mini Cog Total Score 5              Signed Electronically by: Houston Menchaca MD    "

## 2024-04-26 NOTE — PROGRESS NOTES
Preventive Care Visit  [facility] ARISTIDES Menchaca MD, Family Medicine  Apr 26, 2024      Assessment & Plan     Encounter for Medicare annual wellness exam    - Lipid panel reflex to direct LDL Non-fasting; Future  - Lipid panel reflex to direct LDL Non-fasting    White coat syndrome with high blood pressure without hypertension  Will monitor at home   - Basic metabolic panel; Future  - Basic metabolic panel    Encounter for screening mammogram for breast cancer  Will get anually  - Screening Mammogram Digital Bilateral; Future    Asymmetrical sensorineural hearing loss  Will be seen by ENT   - Adult ENT  Referral; Future    Patient has been advised of split billing requirements and indicates understanding: No          Counseling  Appropriate preventive services were discussed with this patient, including applicable screening as appropriate for fall prevention, nutrition, physical activity, Tobacco-use cessation, weight loss and cognition.  Checklist reviewing preventive services available has been given to the patient.  Reviewed patient's diet, addressing concerns and/or questions.   She is at risk for psychosocial distress and has been provided with information to reduce risk.   The patient reports drinking more than one alcoholic drink per day and sometimes engages in binge or excessive drinking. The patient was counseled and given information about possible harmful effects of excessive alcohol intake as well as where to get help for alcohol problems. The patient was provided with written information regarding signs of hearing loss.           Return in about 1 year (around 4/26/2025).    Subjective   Carli is a 73 year old, presenting for the following:  Physical (COVID, hearing issues testing completed,)        4/26/2024     1:44 PM   Additional Questions   Roomed by Ashley   Accompanied by self         4/26/2024    Information    services provided? No          Health Care Directive  Patient has a Health Care Directive on file  Advance care planning document is on file and is current.    HPI  -Wants to talk about alendronate, stopped while traveling started again on January 1st. Initially started in May, was on it for a few months. Recently had teeth removed, was told that were no jaw bone problems and will like to maintain that.    -Hearing exam, 3 years since first one. Unusual hearing loss in one ear. Suggested to talk to ENT,   -Was having plugged ears after coming back from traveling, got another hearing test. Decongestant helped a bit   -Has a frequent cough, more frequent recently for months . Only sister had cancer, was caught and treated on time. Consider lung cancer. maybe CT? To get a baseline   -No pattern to the cough, no weight loss, no night sweats. Not a lot of sputum production. 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 Good   Feel stress (tense, anxious, or unable to sleep) Only a little   (!) STRESS CONCERN      4/26/2024   Nutrition   Diet: Regular (no restrictions)         4/26/2024   Exercise   Days per week of moderate/strenous exercise 4 days         4/26/2024   Social Factors   Frequency of gathering with friends or relatives Once a week   Worry food won't last until get money to buy more No   Food not last or not have enough money for food? No   Do you have housing?  Yes   Are you worried about losing your housing? No   Lack of transportation? No   Unable to  get utilities (heat,electricity)? No         4/26/2024   Fall Risk   Fallen 2 or more times in the past year? No   Trouble with walking or balance? No          4/26/2024   Activities of Daily Living- Home Safety   Needs help with the following daily activites None of the above   Safety concerns in the home None of the above         4/26/2024   Dental   Dentist two times every year? Yes         4/26/2024   Hearing Screening   Hearing concerns? (!) I NEED TO ASK PEOPLE TO SPEAK UP OR REPEAT THEMSELVES.    (!) IT'S HARD TO FOLLOW A CONVERSATION IN A NOISY RESTAURANT OR CROWDED ROOM.    (!) TROUBLE UNDERSTANDING SOFT OR WHISPERED SPEECH.         4/26/2024   Driving Risk Screening   Patient/family members have concerns about driving No         4/26/2024   General Alertness/Fatigue Screening   Have you been more tired than usual lately? No         4/26/2024   Urinary Incontinence Screening   Bothered by leaking urine in past 6 months No         4/26/2024   TB Screening   Were you born outside of the [place]? No         Today's PHQ-2 Score:       4/26/2024     1:39 PM   PHQ-2 ( 1999 Pf[employer])   Q1: Little interest or pleasure in doing things 0   Q2: Feeling down, depressed or hopeless 0   PHQ-2 Score 0   Q1: Little interest or pleasure in doing things Not at all   Q2: Feeling down, depressed or hopeless Not at all   PHQ-2 Score 0           4/26/2024   Substance Use   Alcohol more than 3/day or more than 7/wk Yes   How often do you have a drink containing alcohol 4 or more times a week   How many alcohol drinks on typical day 1 or 2   How often do you have 5+ drinks at one occasion Never   Audit 2/3 Score 0   How often not able to stop drinking once started Never   How often failed to do what normally expected Never   How often needed first drink in am after a heavy drinking session Never   How often feeling of guilt or remorse after drinking Never   How often unable to remember what happened the night before Never   Have you or  someone else been injured because of your drinking No   Has anyone been concerned or suggested you cut down on drinking No   TOTAL SCORE - AUDIT 4   Do you have a current opioid prescription? No   How severe/bad is pain from 1 to 10? 0/10 (No Pain)   Do you use any other substances recreationally? No     Social History     Tobacco Use    Smoking status: Never    Smokeless tobacco: Never   Vaping Use    Vaping status: Never Used   Substance Use Topics    Alcohol use: Yes     Alcohol/week: 5.8 - 7.5 standard drinks of alcohol     Types: 7 - 9 Standard drinks or equivalent per week    Drug use: No           8/15/2023   LAST FHS-7 RESULTS   1st degree relative breast or ovarian cancer Yes   Any relative bilateral breast cancer No   Any male have breast cancer No   Any ONE woman have BOTH breast AND ovarian cancer No   Any woman with breast cancer before 50yrs No   2 or more relatives with breast AND/OR ovarian cancer No   2 or more relatives with breast AND/OR bowel cancer No        Mammogram Screening - After age 74- determine frequency with patient based on health status, life expectancy and patient goals    ASCVD Risk   The ASCVD Risk score (Michael DIETRICH, et al., 2019) failed to calculate for the following reasons:    Cannot find a previous HDL lab    Cannot find a previous total cholesterol lab            Reviewed and updated as needed this visit by Provider   Tobacco  Allergies  Meds  Problems  Med Hx  Surg Hx  Fam Hx              Current providers sharing in care for this patient include:  Patient Care Team:  Houston Menchaca MD as PCP - General (Family Practice)  [place] Menchaca MD as MD (Family Practice)  Frank Diamond MD as MD (Colon and Rectal Surgery)  Houston Menchaca MD as Assigned PCP    The following health maintenance items are reviewed in Epic and correct as of today:  Health Maintenance   Topic Date Due    LIPID  11/28/2023    COVID-19 Vaccine (9 - 2023-24 season) 08/26/2024     "DEXA  01/03/2025    MEDICARE ANNUAL WELLNESS VISIT  04/26/2025    FALL RISK ASSESSMENT  04/26/2025    MAMMO SCREENING  08/15/2025    GLUCOSE  12/13/2025    COLORECTAL CANCER SCREENING  12/02/2026    ADVANCE CARE PLANNING  04/26/2029    DTAP/TDAP/TD IMMUNIZATION (6 - Td or Tdap) 05/11/2033    HEPATITIS C SCREENING  Completed    PHQ-2 (once per calendar year)  Completed    INFLUENZA VACCINE  Completed    Pneumococcal Vaccine: 65+ Years  Completed    IPV IMMUNIZATION  Completed    RSV VACCINE (Pregnancy & 60+)  Completed    HPV IMMUNIZATION  Aged Out    MENINGITIS IMMUNIZATION  Aged Out    RSV MONOCLONAL ANTIBODY  Aged Out    ZOSTER IMMUNIZATION  Discontinued            Objective    Exam  BP (!) 148/72   Pulse 72   Temp 98.4  F (36.9  C) (Oral)   Resp 14   Ht 1.48 m (4' 10.27\")   Wt 52.9 kg (116 lb 11.2 oz)   SpO2 98%   BMI 24.17 kg/m     Estimated body mass index is 24.17 kg/m  as calculated from the following:    Height as of this encounter: 1.48 m (4' 10.27\").    Weight as of this encounter: 52.9 kg (116 lb 11.2 oz).    Physical Exam  Vitals reviewed.   Constitutional:       Appearance: She is well-developed.   HENT:      Head: Normocephalic.      Right Ear: External ear normal.      Left Ear: External ear normal.      Nose: Nose normal.      Mouth/Throat:      Pharynx: No oropharyngeal exudate.   Eyes:      Conjunctiva/sclera: Conjunctivae normal.      Pupils: Pupils are equal, round, and reactive to light.   Cardiovascular:      Rate and Rhythm: Normal rate and regular rhythm.      Heart sounds: Normal heart sounds. No murmur heard.  Pulmonary:      Effort: Pulmonary effort is normal. No respiratory distress.      Breath sounds: Normal breath sounds. No wheezing.   Chest:      Chest wall: No mass or lacerations.   Breasts:     Right: Normal.      Left: Normal.   Abdominal:      General: There is no distension.      Tenderness: There is no abdominal tenderness.   Genitourinary:     Vagina: Normal. "   Musculoskeletal:      Cervical back: Normal range of motion and neck supple.   Skin:     General: Skin is warm and dry.   Neurological:      Mental Status: She is alert and oriented to person, place, and time.      Deep Tendon Reflexes: Reflexes are normal and symmetric.   Psychiatric:         Behavior: Behavior normal.         Thought Content: Thought content normal.               4/26/2024   Mini Cog   Clock Draw Score 2 Normal   3 Item Recall 3 objects recalled   Mini Cog Total Score 5              Signed Electronically by: Houston Menchaca MD

## 2024-04-26 NOTE — PATIENT INSTRUCTIONS
Preventive Care Advice   This is general advice given by our system to help you stay healthy. However, your care team may have specific advice just for you. Please talk to your care team about your preventive care needs.  Nutrition  Eat 5 or more servings of fruits and vegetables each day.  Try wheat bread, brown rice and whole grain pasta (instead of white bread, rice, and pasta).  Get enough calcium and vitamin D. Check the label on foods and aim for 100% of the RDA (recommended daily allowance).  Lifestyle  Exercise at least 150 minutes each week   (30 minutes a day, 5 days a week).  Do muscle strengthening activities 2 days a week. These help control your weight and prevent disease.  No smoking.  Wear sunscreen to prevent skin cancer.  Have a dental exam and cleaning every 6 months.  Yearly exams  See your health care team every year to talk about:  Any changes in your health.  Any medicines your care team has prescribed.  Preventive care, family planning, and ways to prevent chronic diseases.  Shots (vaccines)   HPV shots (up to age 26), if you've never had them before.  Hepatitis B shots (up to age 59), if you've never had them before.  COVID-19 shot: Get this shot when it's due.  Flu shot: Get a flu shot every year.  Tetanus shot: Get a tetanus shot every 10 years.  Pneumococcal, hepatitis A, and RSV shots: Ask your care team if you need these based on your risk.  Shingles shot (for age 50 and up).  General health tests  Diabetes screening:  Starting at age 35, Get screened for diabetes at least every 3 years.  If you are younger than age 35, ask your care team if you should be screened for diabetes.  Cholesterol test: At age 39, start having a cholesterol test every 5 years, or more often if advised.  Bone density scan (DEXA): At age 50, ask your care team if you should have this scan for osteoporosis (brittle bones).  Hepatitis C: Get tested at least once in your life.  STIs (sexually transmitted  infections)  Before age 24: Ask your care team if you should be screened for STIs.  After age 24: Get screened for STIs if you're at risk. You are at risk for STIs (including HIV) if:  You are sexually active with more than one person.  You don't use condoms every time.  You or a partner was diagnosed with a sexually transmitted infection.  If you are at risk for HIV, ask about PrEP medicine to prevent HIV.  Get tested for HIV at least once in your life, whether you are at risk for HIV or not.  Cancer screening tests  Cervical cancer screening: If you have a cervix, begin getting regular cervical cancer screening tests at age 21. Most people who have regular screenings with normal results can stop after age 65. Talk about this with your provider.  Breast cancer scan (mammogram): If you've ever had breasts, begin having regular mammograms starting at age 40. This is a scan to check for breast cancer.  Colon cancer screening: It is important to start screening for colon cancer at age 45.  Have a colonoscopy test every 10 years (or more often if you're at risk) Or, ask your provider about stool tests like a FIT test every year or Cologuard test every 3 years.  To learn more about your testing options, visit: https://[email or web address]/[email or web address].  For help making a decision, visit: https://bit.ly/wl89421.  Prostate cancer screening test: If you have a prostate and are age 55 to 69, ask your provider if you would benefit from a yearly prostate cancer screening test.  Lung cancer screening: If you are a current or former smoker age 50 to 80, ask your care team if ongoing lung cancer screenings are right for you.  For informational purposes only. Not to replace the advice of your health care provider. Copyright   2023 [employer]. All rights reserved. Clinically reviewed by the [facility] Transitions Program. Snapt [id number] - REV 01/24.    Hearing Loss: Care Instructions  Overview     Hearing loss is a  sudden or slow decrease in how well you hear. It can range from slight to profound. Permanent hearing loss can occur with aging. It also can happen when you are exposed long-term to loud noise. Examples include listening to loud music, riding motorcycles, or being around other loud machines.  Hearing loss can affect your work and home life. It can make you feel lonely or depressed. You may feel that you have lost your independence. But hearing aids and other devices can help you hear better and feel connected to others.  Follow-up care is a key part of your treatment and safety. Be sure to make and go to all appointments, and call your doctor if you are having problems. It's also a good idea to know your test results and keep a list of the medicines you take.  How can you care for yourself at home?  Avoid loud noises whenever possible. This helps keep your hearing from getting worse.  Always wear hearing protection around loud noises.  Wear a hearing aid as directed.  A professional can help you pick a hearing aid that will work best for you.  You can also get hearing aids over the counter for mild to moderate hearing loss.  Have hearing tests as your doctor suggests. They can show whether your hearing has changed. Your hearing aid may need to be adjusted.  Use other devices as needed. These may include:  Telephone amplifiers and hearing aids that can connect to a television, stereo, radio, or microphone.  Devices that use lights or vibrations. These alert you to the doorbell, a ringing telephone, or a baby monitor.  Television closed-captioning. This shows the words at the bottom of the screen. Most new TVs can do this.  TTY (text telephone). This lets you type messages back and forth on the telephone instead of talking or listening. These devices are also called TDD. When messages are typed on the keyboard, they are sent over the phone line to a receiving TTY. The message is shown on a monitor.  Use text  "messaging, social media, and email if it is hard for you to communicate by telephone.  Try to learn a listening technique called speechreading. It is not lipreading. You pay attention to people's gestures, expressions, posture, and tone of voice. These clues can help you understand what a person is saying. Face the person you are talking to, and have them face you. Make sure the lighting is good. You need to see the other person's face clearly.  Think about counseling if you need help to adjust to your hearing loss.  When should you call for help?  Watch closely for changes in your health, and be sure to contact your doctor if:    You think your hearing is getting worse.     You have new symptoms, such as dizziness or nausea.   Where can you learn more?  Go to https://www.[employer].net/patiented  Enter R798 in the search box to learn more about \"Hearing Loss: Care Instructions.\"  Current as of: September 27, 2023               Content Version: 14.0    1931-8751 [employer].   Care instructions adapted under license by your healthcare professional. If you have questions about a medical condition or this instruction, always ask your healthcare professional. [employer] disclaims any warranty or liability for your use of this information.      Learning About Stress  What is stress?     Stress is your body's response to a hard situation. Your body can have a physical, emotional, or mental response. Stress is a fact of life for most people, and it affects everyone differently. What causes stress for you may not be stressful for someone else.  A lot of things can cause stress. You may feel stress when you go on a job interview, take a test, or run a race. This kind of short-term stress is normal and even useful. It can help you if you need to work hard or react quickly. For example, stress can help you finish an important job on time.  Long-term stress is caused by ongoing stressful situations " or events. Examples of long-term stress include long-term health problems, ongoing problems at work, or conflicts in your family. Long-term stress can harm your health.  How does stress affect your health?  When you are stressed, your body responds as though you are in danger. It makes hormones that speed up your heart, make you breathe faster, and give you a burst of energy. This is called the fight-or-flight stress response. If the stress is over quickly, your body goes back to normal and no harm is done.  But if stress happens too often or lasts too long, it can have bad effects. Long-term stress can make you more likely to get sick, and it can make symptoms of some diseases worse. If you tense up when you are stressed, you may develop neck, shoulder, or low back pain. Stress is linked to high blood pressure and heart disease.  Stress also harms your emotional health. It can make you khanna, tense, or depressed. Your relationships may suffer, and you may not do well at work or school.  What can you do to manage stress?  You can try these things to help manage stress:   Do something active. Exercise or activity can help reduce stress. Walking is a great way to get started. Even everyday activities such as housecleaning or yard work can help.  Try yoga or adolfo chi. These techniques combine exercise and meditation. You may need some training at first to learn them.  Do something you enjoy. For example, listen to music or go to a movie. Practice your hobby or do volunteer work.  Meditate. This can help you relax, because you are not worrying about what happened before or what may happen in the future.  Do guided imagery. Imagine yourself in any setting that helps you feel calm. You can use online videos, books, or a teacher to guide you.  Do breathing exercises. For example:  From a standing position, bend forward from the waist with your knees slightly bent. Let your arms dangle close to the floor.  Breathe in slowly  "and deeply as you return to a standing position. Roll up slowly and lift your head last.  Hold your breath for just a few seconds in the standing position.  Breathe out slowly and bend forward from the waist.  Let your feelings out. Talk, laugh, cry, and express anger when you need to. Talking with supportive friends or family, a counselor, or a sridhar leader about your feelings is a healthy way to relieve stress. Avoid discussing your feelings with people who make you feel worse.  Write. It may help to write about things that are bothering you. This helps you find out how much stress you feel and what is causing it. When you know this, you can find better ways to cope.  What can you do to prevent stress?  You might try some of these things to help prevent stress:  Manage your time. This helps you find time to do the things you want and need to do.  Get enough sleep. Your body recovers from the stresses of the day while you are sleeping.  Get support. Your family, friends, and community can make a difference in how you experience stress.  Limit your news feed. Avoid or limit time on social media or news that may make you feel stressed.  Do something active. Exercise or activity can help reduce stress. Walking is a great way to get started.  Where can you learn more?  Go to https://www.[employer].net/patiented  Enter N032 in the search box to learn more about \"Learning About Stress.\"  Current as of: October 24, 2023               Content Version: 14.0    2037-3909 [employer].   Care instructions adapted under license by your healthcare professional. If you have questions about a medical condition or this instruction, always ask your healthcare professional. [employer] disclaims any warranty or liability for your use of this information.      Learning About Alcohol Use Disorder  What is alcohol use disorder?  Alcohol use disorder means that a person drinks alcohol even though it causes harm " to themselves or others. It can range from mild to severe. The more symptoms of this disorder you have, the more severe it may be. People who have it may find it hard to control their use of alcohol.  People who have this disorder may argue with others about how much they're drinking. Their job may be affected because of drinking. They may drink when it's dangerous or illegal, such as when they drive. They also may have a strong need, or craving, to drink. They may feel like they must drink just to get by. Their drinking may increase their risk of getting hurt or being in a car crash.  Over time, drinking too much alcohol may cause health problems. These may include high blood pressure, liver problems, or problems with digestion.  What are the symptoms?  Maybe you've wondered about your alcohol habits or how to tell if your drinking is becoming a problem.  Here are some of the symptoms of alcohol use disorder. You may have it if you have two or more of the following symptoms:  You drink larger amounts of alcohol than you ever meant to. Or you've been drinking for a longer time than you ever meant to.  You can't cut down or control your use. Or you constantly wish you could cut down.  You spend a lot of time getting or drinking alcohol or recovering from its effects.  You have strong cravings for alcohol.  You can no longer do your main jobs at work, at school, or at home.  You keep drinking alcohol, even though your use hurts your relationships.  You have stopped doing important activities because of your alcohol use.  You drink alcohol in situations where doing so is dangerous.  You keep drinking alcohol even though you know it's causing health problems.  You need more and more alcohol to get the same effect, or you get less effect from the same amount over time. This is called tolerance.  You have uncomfortable symptoms when you stop drinking alcohol or use less. This is called withdrawal.  Alcohol use disorder  "can range from mild to severe. The more symptoms you have, the more severe the disorder may be.  You might not realize that your drinking is a problem. You might not drink large amounts when you drink. Or you might go for days or weeks between drinking episodes. But even if you don't drink very often, your drinking could still be harmful and put you at risk.  How is alcohol use disorder treated?  Getting help is up to you. But you don't have to do it alone. There are many people and kinds of treatments that can help.  Treatment for alcohol use disorder can include:  Group therapy, one or more types of counseling, and alcohol education.  Medicines that help to:  Reduce withdrawal symptoms and help you safely stop drinking.  Reduce cravings for alcohol.  Support groups. These groups include Alcoholics Anonymous and [employer] (Self-Management and Recovery Training).  Some people are able to stop or cut back on drinking with help from a counselor. People who have moderate to severe alcohol use disorder may need medical treatment. They may need to stay in a hospital or treatment center.  You may have a treatment team to help you. This team may include a psychologist or psychiatrist, counselors, doctors, social workers, nurses, and a . A  helps plan and manage your treatment.  Follow-up care is a key part of your treatment and safety. Be sure to make and go to all appointments, and call your doctor if you are having problems. It's also a good idea to know your test results and keep a list of the medicines you take.  Where can you learn more?  Go to https://www.[employer].net/patiented  Enter H758 in the search box to learn more about \"Learning About Alcohol Use Disorder.\"  Current as of: November 15, 2023               Content Version: 14.0    7924-8114 [employer].   Care instructions adapted under license by your healthcare professional. If you have questions about a medical " condition or this instruction, always ask your healthcare professional. [employer] disclaims any warranty or liability for your use of this information.

## 2024-04-27 LAB
ANION GAP SERPL CALCULATED.3IONS-SCNC: 9 MMOL/L (ref 7–15)
BUN SERPL-MCNC: 25.6 MG/DL (ref 8–23)
CALCIUM SERPL-MCNC: 9.7 MG/DL (ref 8.8–10.2)
CHLORIDE SERPL-SCNC: 106 MMOL/L (ref 98–107)
CHOLEST SERPL-MCNC: 185 MG/DL
CREAT SERPL-MCNC: 1.06 MG/DL (ref 0.51–0.95)
DEPRECATED HCO3 PLAS-SCNC: 28 MMOL/L (ref 22–29)
EGFRCR SERPLBLD CKD-EPI 2021: 55 ML/MIN/1.73M2
FASTING STATUS PATIENT QL REPORTED: NORMAL
GLUCOSE SERPL-MCNC: 90 MG/DL (ref 70–99)
HDLC SERPL-MCNC: 93 MG/DL
LDLC SERPL CALC-MCNC: 72 MG/DL
NONHDLC SERPL-MCNC: 92 MG/DL
POTASSIUM SERPL-SCNC: 3.8 MMOL/L (ref 3.4–5.3)
SODIUM SERPL-SCNC: 143 MMOL/L (ref 135–145)
TRIGL SERPL-MCNC: 98 MG/DL

## 2024-06-04 ENCOUNTER — OFFICE VISIT (OUTPATIENT)
Dept: FAMILY MEDICINE | Facility: CLINIC | Age: 74
End: 2024-06-04
Payer: MEDICARE

## 2024-06-04 ENCOUNTER — ANCILLARY PROCEDURE (OUTPATIENT)
Dept: GENERAL RADIOLOGY | Facility: CLINIC | Age: 74
End: 2024-06-04
Attending: FAMILY MEDICINE
Payer: MEDICARE

## 2024-06-04 VITALS
TEMPERATURE: 98.4 F | WEIGHT: 115.2 LBS | RESPIRATION RATE: 16 BRPM | HEIGHT: 59 IN | SYSTOLIC BLOOD PRESSURE: 138 MMHG | DIASTOLIC BLOOD PRESSURE: 83 MMHG | BODY MASS INDEX: 23.22 KG/M2 | HEART RATE: 66 BPM | OXYGEN SATURATION: 99 %

## 2024-06-04 DIAGNOSIS — G89.29 CHRONIC RIGHT SHOULDER PAIN: ICD-10-CM

## 2024-06-04 DIAGNOSIS — R20.0 NUMBNESS AND TINGLING OF RIGHT HAND: ICD-10-CM

## 2024-06-04 DIAGNOSIS — R20.2 NUMBNESS AND TINGLING OF RIGHT HAND: ICD-10-CM

## 2024-06-04 DIAGNOSIS — M54.2 NECK PAIN: ICD-10-CM

## 2024-06-04 DIAGNOSIS — M25.511 CHRONIC RIGHT SHOULDER PAIN: ICD-10-CM

## 2024-06-04 DIAGNOSIS — M54.2 NECK PAIN: Primary | ICD-10-CM

## 2024-06-04 PROCEDURE — 72040 X-RAY EXAM NECK SPINE 2-3 VW: CPT | Mod: FY | Performed by: RADIOLOGY

## 2024-06-04 PROCEDURE — 99214 OFFICE O/P EST MOD 30 MIN: CPT | Performed by: FAMILY MEDICINE

## 2024-06-04 PROCEDURE — 73030 X-RAY EXAM OF SHOULDER: CPT | Mod: RT | Performed by: RADIOLOGY

## 2024-06-04 NOTE — PROGRESS NOTES
Assessment & Plan     Neck pain  Noted OA C4, C5-6 and C6-7  Consider MRI to further define stenosis  - X-ray Cervical spine 2-3 vws; Future  - Physical Therapy  Referral; Future    Chronic right shoulder pain    - X-ray rt Shoulder G/E 3 vw; Future  - Physical Therapy  Referral; Future    Numbness and tingling of right hand  Reports entire hand, but has been dropping items more often  Denies CTS symptoms  Nerve symptoms at elbow and into right hand  Numbness with steering wheel or holding book- consider ulnar neuropathy,  but very intermittent    - Occupational Therapy  Referral; Future            See Patient Instructions    Return in about 6 weeks (around 7/16/2024), or if symptoms worsen or fail to improve.    Subjective   Carli is a 73 year old, presenting for the following health issues:  OTHER (Right arm numbness)        6/4/2024    10:30 AM   Additional Questions   Roomed by Doua   Accompanied by Self         6/4/2024    10:30 AM   Patient Reported Additional Medications   Patient reports taking the following new medications yes         6/4/2024    Information    services provided? No     HPI : 72 yo female with neck and shoulder pains. Numbness in arm and hand not any better.  Comes on at night, sleeps in a wrong way.  Only once had anxiety about what was happening.  Usually can have it come on if she's reading in a chair, just not a pattern.  Doesn't feel she can stop  Steering wheel held and it's numbness coming on.  Soreness on right side of her neck, always on the right side and right arm.  Hearing exam- change was odd, seen by ENT, MRI  Fractured her hip    Has questions about Lyme's disease and doxy dosing.  Lots of ticks up at the cabin and over 1 hour to get to the pharmacy              Review of Systems  Constitutional, neuro, ENT, endocrine, pulmonary, cardiac, gastrointestinal, genitourinary, musculoskeletal, integument and psychiatric systems  "are negative, except as otherwise noted.      Objective    /83 (BP Location: Left arm, Patient Position: Sitting, Cuff Size: Adult Regular)   Pulse 66   Temp 98.4  F (36.9  C) (Oral)   Resp 16   Ht 1.499 m (4' 11\")   Wt 52.3 kg (115 lb 3.2 oz)   SpO2 99%   BMI 23.27 kg/m    Body mass index is 23.27 kg/m .  Physical Exam   Neck:   ROM: flexion -full; extension -full; lateral rotation- R - full/ L - full ; side bend - R - full/ L -full.   Tenderness: Bony - Yes; Paraspinal: No; Muscular: No; Scapula: Yes- medial border of scapula C6-7  Sensation: intact in bilateral upper extremities   Strength: Biceps- 5/5; Triceps- 5/5; wrist flexion 5/5; Extension 5/5; finger abduction- 5/5.   Neuro/ Maneuvers: Negative Spurling bilaterally.    right Shoulder:   Inspection: asymmetry? no; dyskinesis? no   ROM:   Active - forward flexion - full/ abduction - full/ int rot - symmetric/ ext rot - symmetric   Passive- forward flexion - full/ abduction - full   Strength: deltoid/supraspinatous - 5/5; infraspinatous/ teres minor - 5/5; subscapularis - 5/5   Maneuvers:   RTC - empty can - neg; painful arc - neg; lift off - neg   Impingement - Lainez -neg; Neer- neg   Palpation: AC - neg; Acromion/ supraspinatus - neg; scapula - mild medial border; bicipital groove - neg      Xray - Reviewed and interpreted by me.  Cervical spine x-ray- extensive degenerative changes C5-6, and C6-7  Shoulder right x-ray- mild degenerative change including small subacromial enthesophyte        Signed Electronically by: Yareli Wheeler MD    "

## 2024-06-04 NOTE — PATIENT INSTRUCTIONS
Physical therapy- strengthening neck and shoulder    Hand therapy- elbow to wrist- for nerve glides

## 2024-06-05 DIAGNOSIS — M80.80XA LOCALIZED OSTEOPOROSIS WITH CURRENT PATHOLOGICAL FRACTURE, INITIAL ENCOUNTER: ICD-10-CM

## 2024-06-05 NOTE — TELEPHONE ENCOUNTER
"Request for medication refill:  alendronate (FOSAMAX) 70 MG tablet     Providers if patient needs an appointment and you are willing to give a one month supply please refill for one month and  send a letter/MyChart using \".SMILLIMITEDREFILL\" .smillimited and route chart to \"[facility] \" (Giving one month refill in non controlled medications is strongly recommended before denial)    If refill has been denied, meaning absolutely no refills without visit, please complete the smart phrase \".smirxrefuse\" and route it to the \"[facility] MED REFILLS\"  pool to inform the patient and the pharmacy.    Kwame Gómez, CMA      "

## 2024-06-06 RX ORDER — ALENDRONATE SODIUM 70 MG/1
70 TABLET ORAL
Qty: 12 TABLET | Refills: 3 | Status: SHIPPED | OUTPATIENT
Start: 2024-06-06

## 2024-06-13 ENCOUNTER — THERAPY VISIT (OUTPATIENT)
Dept: PHYSICAL THERAPY | Facility: CLINIC | Age: 74
End: 2024-06-13
Attending: FAMILY MEDICINE
Payer: MEDICARE

## 2024-06-13 DIAGNOSIS — M54.2 NECK PAIN: Primary | ICD-10-CM

## 2024-06-13 DIAGNOSIS — G89.29 CHRONIC RIGHT SHOULDER PAIN: ICD-10-CM

## 2024-06-13 DIAGNOSIS — M79.18 MYOFASCIAL PAIN: ICD-10-CM

## 2024-06-13 DIAGNOSIS — M25.511 CHRONIC RIGHT SHOULDER PAIN: ICD-10-CM

## 2024-06-13 PROCEDURE — 97110 THERAPEUTIC EXERCISES: CPT | Mod: GP | Performed by: PHYSICAL THERAPIST

## 2024-06-13 PROCEDURE — 97161 PT EVAL LOW COMPLEX 20 MIN: CPT | Mod: GP | Performed by: PHYSICAL THERAPIST

## 2024-06-13 PROCEDURE — 97530 THERAPEUTIC ACTIVITIES: CPT | Mod: GP | Performed by: PHYSICAL THERAPIST

## 2024-06-13 ASSESSMENT — ACTIVITIES OF DAILY LIVING (ADL)
WASHING_YOUR_HAIR?: 0
PUTTING_ON_YOUR_PANTS: 0
PLACING_AN_OBJECT_ON_A_HIGH_SHELF: 1
PUTTING_ON_A_SHIRT_THAT_BUTTONS_DOWN_THE_FRONT: 0
REACHING_FOR_SOMETHING_ON_A_HIGH_SHELF: 2
PUSHING_WITH_THE_INVOLVED_ARM: 3
REMOVING_SOMETHING_FROM_YOUR_BACK_POCKET: 0
CARRYING_A_HEAVY_OBJECT_OF_10_POUNDS: 0
AT_ITS_WORST?: 6
PLEASE_INDICATE_YOR_PRIMARY_REASON_FOR_REFERRAL_TO_THERAPY:: SHOULDER
TOUCHING_THE_BACK_OF_YOUR_NECK: 2
WHEN_LYING_ON_THE_INVOLVED_SIDE: 2
WASHING_YOUR_BACK: 0
PUTTING_ON_AN_UNDERSHIRT_OR_A_PULLOVER_SWEATER: 0

## 2024-06-13 NOTE — PROGRESS NOTES
PHYSICAL THERAPY EVALUATION  Type of Visit: Evaluation    See electronic medical record for Abuse and Falls Screening details.    Patient presents with signs and symptoms consistent with neck and R shoulder pain secondary to cervical degeneration and R shoulder impingement. Patient demonstrates impairments including decreased cervical range of motion, joint mobility and TTP at cervical musculature and especially at thoracic outlet/UT. Patient with functional limitations including driving, gardening, reading and especially sleeping. Patient would benefit from skilled PT to progress and improve impairments and concerns.      Subjective       Presenting condition or subjective complaint: neck and shoulder pain; 6 months ago she was getting numbness in the R hand, now R shoulder became sore too. Numbness will be so bad at night she can't sleep at all; reported R shoulder impingement years ago that cured the pain symptoms.   Date of onset: 06/04/24    Relevant medical history:     Past Medical History:   Diagnosis Date    Atrophic vaginitis     Left elbow pain 11/07/2023     Dates & types of surgery:    Past Surgical History:   Procedure Laterality Date    APPENDECTOMY  3/2012    CLOSED REDUCTION, PERCUTANEOUS PINNING HIP Right 11/1/2022    Procedure: CLOSED REDUCTION, RIGHT HIP WITH PERCUTANEOUS PINNING.;  Surgeon: Arian Platt MD;  Location: [facility]       Prior diagnostic imaging/testing results: X-ray   Xray cervical and R shoulder Extensive multilevel cervical degenerative changes, greatest at C5-6 and C6-7.  Prior therapy history for the same diagnosis, illness or injury: Yes frozen shoulder  15 or more years ago maybe ultra sound    Living Environment  Social support: With a significant other or spouse   Type of home: House; 1 level; Basement   Stairs to enter the home: Yes 3 Is there a railing: Yes   Ramp: No   Stairs inside the home: Yes 1 Is there a railing: Yes   Help at home: None    Employment: No     Hobbies/Interests: gardening walking XC skiing    Patient goals for therapy: garden without pain    Pain assessment: Pain present  See objective evaluation for additional pain details     Objective     CERVICAL SPINE EVALUATION    PAIN: Pain Location: right neck pain is chronic for her  Pain Quality: numbness tingling down the arm  Pain is Worst: patient sleeps on her stomach and always has increased P symptoms, head turns to the right  Pain is Exacerbated By: driving, gardening, sleeping at night  Pain is Relieved By: IBU   Pain Progression: gotten more aggravated    POSTURE: Sitting Posture: Rounded shoulders, Forward head    ROM:   (Degrees) Left AROM Right AROM    Cervical Flexion 30    Cervical Extension 39    Cervical Side bend 39 42    Cervical Rotation 55 65    Cervical Protrusion     Cervical Retraction     Thoracic Flexion     Thoracic Extension     Thoracic Rotation        MYOTOMES: WFL no inc of P    NEURAL TENSION:    Left Right   Median  -   Ulnar     Radial        FLEXIBILITY: decreased of UT, levators, cervical paraspinals R>L   PALPATION: TTP at R UT    Assessment & Plan   CLINICAL IMPRESSIONS  Medical Diagnosis: Neck pain, R shoulder pain    Treatment Diagnosis: Neck pain, poor posture, R shoulder pain   Impression/Assessment: Patient is a 73 year old female with neck and R shoulder pain complaints.  The following significant findings have been identified: Pain, Decreased ROM/flexibility, Decreased joint mobility, Decreased activity tolerance, and Impaired posture. These impairments interfere with their ability to perform self care tasks, recreational activities, household chores, and driving  as compared to previous level of function.     Clinical Decision Making (Complexity):  Clinical Presentation: Stable/Uncomplicated  Clinical Presentation Rationale: based on medical and personal factors listed in PT evaluation  Clinical Decision Making (Complexity): Low complexity    PLAN OF CARE  Treatment  Interventions:  Modalities: Cryotherapy, E-stim, Ultrasound  Interventions: Manual Therapy, Neuromuscular Re-education, Therapeutic Activity, Therapeutic Exercise, Self-Care/Home Management    Long Term Goals     PT Goal 1  Goal Identifier: gardening  Goal Description: Patient will be able to garden without increased P symptoms in her neck or arm in order to continue outdoor work without difficulty.  Goal Progress: Baseline: increased P with gardening  Target Date: 08/12/24  PT Goal 2  Goal Identifier: Sleeping  Goal Description: Patient will be able to sleep at least 7 hours without waking up in pain symptoms in order to improve restorative sleep.  Goal Progress: Baseline: waking up multiple times a night  Target Date: 08/21/24      Frequency of Treatment: 1x/week for 4 weeks, 2x/month for 2 months, then PRN  Duration of Treatment: up to 70 days    Risks and benefits of evaluation/treatment have been explained.   Patient/Family/caregiver agrees with Plan of Care.     Evaluation Time:     PT Eval, Low Complexity Minutes (97114): 15  Signing Clinician: JEN CROSS, FRANKY      [facility]                                                                                   OUTPATIENT PHYSICAL THERAPY      PLAN OF TREATMENT FOR OUTPATIENT REHABILITATION   Patient's Last Name, First Name, Carli Garcia YOB: 1950   Provider's Name   [facility]   Medical Record No.  [id number]     Onset Date: 06/04/24  Start of Care Date: 06/13/24     Medical Diagnosis:  Neck pain, R shoulder pain      PT Treatment Diagnosis:  Neck pain, poor posture, R shoulder pain Plan of Treatment  Frequency/Duration: 1x/week for 4 weeks, 2x/month for 2 months, then PRN/ up to 70 days    Certification date from 06/13/24 to 08/21/24         See note for plan of treatment details and functional goals     JEN CROSS, PT                         I CERTIFY THE NEED FOR  THESE SERVICES FURNISHED UNDER        THIS PLAN OF TREATMENT AND WHILE UNDER MY CARE     (Physician attestation of this document indicates review and certification of the therapy plan).              Referring Provider:  Yareli Wheeler    Initial Assessment  See Epic Evaluation- Start of Care Date: 06/13/24

## 2024-06-17 NOTE — PROGRESS NOTES
OCCUPATIONAL THERAPY EVALUATION  Type of Visit: Evaluation       Fall Risk Screen:  Fall screen completed by: PT  Have you fallen 2 or more times in the past year?: No  Have you fallen and had an injury in the past year?: No  Is patient a fall risk?: No    Subjective      Presenting condition or subjective complaint: Numbness and tingling on R Arm  Date of onset: 06/04/24    Relevant medical history:     Dates & types of surgery:      Prior diagnostic imaging/testing results: X-ray     Prior therapy history for the same diagnosis, illness or injury: Yes frozen shoulder  15 or more years ago maybe ultra sound    Living Environment  Social support: With a significant other or spouse   Type of home: House; 1 level; Basement   Stairs to enter the home: Yes 3 Is there a railing: Yes     Ramp: No   Stairs inside the home: Yes 1 Is there a railing: Yes     Help at home: None  Equipment owned:       Employment: No    Hobbies/Interests: gardening walking XC skiing    Patient goals for therapy: garden without pain, use arms without numbness/tingling    Pain assessment: See objective evaluation for additional pain details     Objective   Right hand dominant  Patient reports symptoms of numbness and tingling     Pain Level (Scale 0-10)   6/18/2024   At Rest 0   With Use 0     Pain Description  Date 6/18/2024   Location Right Hand   Pain Quality Numbness and tingling   Frequency intermittent     Pain is worst  daytime or nighttime   Exacerbated by  Elevating arms, holding something in right hand, driving, sleeping position   Relieved by Shake out the hand   Progression Gradually worsening - more frequent and lasting longer     Edema  None    Sensation  Intermittent numbness and tingling through right hand with activity and sleeping position. Feels like 1st 3 fingers but possibly whole hand but unsure.    ROM  Pain Report: - none  + mild    ++ moderate    +++ severe   Elbow 6/18/2024 6/18/2024   AROM (PROM) Left Right   Extension  -10 -10   Flexion 140 140     ROM  Pain Report: - none  + mild    ++ moderate    +++ severe   Wrist 6/18/2024 6/18/2024   AROM (PROM) Left Right   Extension 70 56   Flexion 70 70   RD 15 20   UD 37 32   Supination 80 75   Pronation 75 75     ROM - Fingers and thumbs WNL bilaterally    Special Tests   - none    + mild    ++ moderate    +++ severe       6/18/2024   Elbow Flexion Test + mild in all fingers at 60 sec   Tinels Cubital Tunnel + Approx 6 in distal to cubital tunnel   Tinels [place] -   Median Nerve Compression at Pronator -   Ferreira test for lumbrical incursion  (fist x 30 secs) -   Tinels at Carpal Tunnel -   Phalens -   Radial nerve compression at PIN site -   Tinels DRSN -     Neural Tension Testing  MNT: Median Neurodynamic Test (based on DS Anh's ULNT)   6/18/2024   0-5 Scale 4/5   Position:   0/5: Arm across abdomen in coronal plane  1/5: Depress shoulder, ER to neutral ABD shoulder to 45 degrees  2/5: ER shoulder to end range, keep elbow at 90 degrees  3/5: Extend elbow to 0 degrees  4/5: Fully supinate forearm  5/5: Extend wrist, fingers and thumb    RNT: Radial Neurodynamic Test (based on DS Kamara's ULNT)   6/18/2024   0-5 Scale 5/5   Position:   0/5: Arm across abdomen in coronal plane  1/5: Depress shoulder, ER to neutral ABD shoulder to 45 degrees  2/5: IR shoulder to end range, keep elbow at 90 degrees  3/5: Extend elbow to 0 degrees  4/5: Fully pronate forearm  5/5: Flex wrist and fingers with UD    UNT: Ulnar Neurodynamic Test (based on DS Kamara's ULNT)   6/18/2024   0-5 Scale 4/5   Position:   0/5: Arm across abdomen in coronal plane  1/5: ER to neutral ABD shoulder to 45 degrees  2/5: ER shoulder to 90 degrees  3/5: Block shoulder and ABD shoulder to 110 degrees  4/5: Fully pronate forearm, extend wrist and ring and small fingers  5/5: Flex elbow and bring hand to side of face  Notes:  (+) indicates beyond grade level but less than group home to next level  (-) indicates over  penitentiary to level  S1  onset/change of patient's symptoms  S2 definite stop point based on patient's discomfort level      Strength   (Measured in pounds)  Pain Report: - none  + mild    ++ moderate    +++ severe    6/18/2024 6/18/2024   Trials Left Right   1  2  3 40 40   Average 40 40     Lat Pinch 6/18/2024 6/18/2024   Trials Left Right   1  2  3 10 + thumb 12   Average 10 12     3 Pt Pinch 6/18/2024 6/18/2024   Trials Left Right   1  2  3 11+ thumb 11   Average 11 11      Assessment & Plan   CLINICAL IMPRESSIONS  Medical Diagnosis: R Hand Numbness/tingling    Treatment Diagnosis: R Hand Numbness/tingling    Impression/Assessment: Pt is a 73 year old female presenting to Occupational Therapy due to R hand numbness/tingling.  The following significant findings have been identified: Impaired ROM, Impaired sensation, Impaired strength These identified deficits interfere with their ability to perform recreational activities, household chores, and driving  as compared to previous level of function.     Clinical Decision Making (Complexity):  Assessment of Occupational Performance: 3-5 Performance Deficits  Occupational Performance Limitations: hygiene and grooming, driving and community mobility, home establishment and management, sleep, and leisure activities  Clinical Decision Making (Complexity): Low complexity    PLAN OF CARE  Treatment Interventions:  Therapeutic Exercise:  PROM and Tendon Gliding  Neuromuscular re-education:  Nerve Gliding and Kinesiotaping  Manual Techniques:  Myofascial release  Orthotic Fabrication:  Forearm based  Self Care:  Self Care Tasks    Long Term Goals   OT Goal 1  Goal Identifier: Driving  Goal Description: Patient will be able to drive without numbness/tingling in right hand  Rationale: In order to maximize safety and independence with ADL/IADLs  Target Date: 08/18/24      Frequency of Treatment: 2 x Month  Duration of Treatment: 2 Months     Recommended Referrals to Other  Professionals: Physical Therapy - Continue to see for R shoulder and cervical spine  Education Assessment:       Risks and benefits of evaluation/treatment have been explained.   Patient/Family/caregiver agrees with Plan of Care.     Evaluation Time:    OT Eval, Low Complexity Minutes (67853): 25    Signing Clinician: CARLOS ALBERTO Chaidez [facility]                                                                                   OUTPATIENT OCCUPATIONAL THERAPY      PLAN OF TREATMENT FOR OUTPATIENT REHABILITATION   Patient's Last Name, First Name, Carli Garcia YOB: 1950   Provider's Name   [facility]   Medical Record No.  [id number]     Onset Date: 06/04/24 Start of Care Date: 06/18/24     Medical Diagnosis:  R Hand Numbness/tingling      OT Treatment Diagnosis:  R Hand Numbness/tingling Plan of Treatment  Frequency/Duration:2 x Month/2 Months    Certification date from 06/18/24   To 08/18/24        See note for plan of treatment details and functional goals     Analy Khalil OT                         I CERTIFY THE NEED FOR THESE SERVICES FURNISHED UNDER        THIS PLAN OF TREATMENT AND WHILE UNDER MY CARE     (Physician attestation of this document indicates review and certification of the therapy plan).              Referring Provider:  Yareli Wheeler    Initial Assessment  See Epic Evaluation- 06/18/24

## 2024-06-18 ENCOUNTER — THERAPY VISIT (OUTPATIENT)
Dept: OCCUPATIONAL THERAPY | Facility: CLINIC | Age: 74
End: 2024-06-18
Payer: MEDICARE

## 2024-06-18 DIAGNOSIS — R20.2 NUMBNESS AND TINGLING OF RIGHT HAND: ICD-10-CM

## 2024-06-18 DIAGNOSIS — R20.0 NUMBNESS AND TINGLING OF RIGHT HAND: ICD-10-CM

## 2024-06-18 PROCEDURE — 97165 OT EVAL LOW COMPLEX 30 MIN: CPT | Mod: GO | Performed by: OCCUPATIONAL THERAPIST

## 2024-06-18 PROCEDURE — 97112 NEUROMUSCULAR REEDUCATION: CPT | Mod: GO | Performed by: OCCUPATIONAL THERAPIST

## 2024-06-19 ENCOUNTER — THERAPY VISIT (OUTPATIENT)
Dept: PHYSICAL THERAPY | Facility: CLINIC | Age: 74
End: 2024-06-19
Payer: MEDICARE

## 2024-06-19 DIAGNOSIS — M79.18 MYOFASCIAL PAIN: ICD-10-CM

## 2024-06-19 DIAGNOSIS — M25.511 CHRONIC RIGHT SHOULDER PAIN: ICD-10-CM

## 2024-06-19 DIAGNOSIS — G89.29 CHRONIC RIGHT SHOULDER PAIN: ICD-10-CM

## 2024-06-19 DIAGNOSIS — M54.2 NECK PAIN: Primary | ICD-10-CM

## 2024-06-19 PROCEDURE — 97530 THERAPEUTIC ACTIVITIES: CPT | Mod: GP | Performed by: PHYSICAL THERAPIST

## 2024-06-19 PROCEDURE — 97110 THERAPEUTIC EXERCISES: CPT | Mod: GP | Performed by: PHYSICAL THERAPIST

## 2024-06-24 ENCOUNTER — TRANSFERRED RECORDS (OUTPATIENT)
Dept: HEALTH INFORMATION MANAGEMENT | Facility: CLINIC | Age: 74
End: 2024-06-24

## 2024-06-25 ENCOUNTER — VIRTUAL VISIT (OUTPATIENT)
Dept: FAMILY MEDICINE | Facility: CLINIC | Age: 74
End: 2024-06-25
Payer: MEDICARE

## 2024-06-25 DIAGNOSIS — E78.2 MIXED HYPERLIPIDEMIA: Primary | ICD-10-CM

## 2024-06-25 DIAGNOSIS — N28.9 FUNCTION KIDNEY DECREASED: ICD-10-CM

## 2024-06-25 PROCEDURE — 99214 OFFICE O/P EST MOD 30 MIN: CPT | Mod: 95 | Performed by: FAMILY MEDICINE

## 2024-06-25 PROCEDURE — G2211 COMPLEX E/M VISIT ADD ON: HCPCS | Mod: 95 | Performed by: FAMILY MEDICINE

## 2024-06-25 RX ORDER — ATORVASTATIN CALCIUM 10 MG/1
10 TABLET, FILM COATED ORAL DAILY
Qty: 90 TABLET | Refills: 3 | Status: SHIPPED | OUTPATIENT
Start: 2024-06-25

## 2024-06-25 NOTE — PROGRESS NOTES
Carli is a 74 year old who is being evaluated via a billable video visit.    How would you like to obtain your AVS? MyChart  If the video visit is dropped, the invitation should be resent by: Text to cell phone: [phone]  Will anyone else be joining your video visit? No      Assessment & Plan     1. Mixed hyperlipidemia  Discussed hyperlipidemia in detail reviewed several different calculators which ranged from 18% to 12 or 14% also discussed family history and patient's own health habits.  And so we also discussed the risks of atorvastatin including myopathy and a small rate of increased diabetes.  I encouraged her to continue to have healthy habits and we decided to go for a low-dose initiate atorvastatin and recheck and and see how things go with her side effects.  - atorvastatin (LIPITOR) 10 MG tablet; Take 1 tablet (10 mg) by mouth daily  Dispense: 90 tablet; Refill: 3    2. Function kidney decreased  We reviewed patient's kidney function which showed a relatively stable kidney function in the high 50s GFR to the low 60s GFR.  We discussed that that atorvastatin would would also be recommended for management of kidney conditions.  Will discuss with patient at next visit about considering use of a ACE or ARB.    The longitudinal plan of care for the diagnosis(es)/condition(s) as documented were addressed during this visit. Due to the added complexity in care, I will continue to support Carli in the subsequent management and with ongoing continuity of care.  I spent a total of 31 minutes on the day of the visit.   Time spent by me doing chart review, history and exam, documentation and further activities per the note        Return in about 10 months (around 4/25/2025).    Saturnino Boyce is a 74 year old, presenting for the following health issues:  Follow Up (Medication follow up )        6/25/2024     9:03 AM   Additional Questions   Roomed by Lexii   Accompanied by self         6/25/2024     Information    services provided? No        Video Start Time:  8:40    HPI         The 10-year ASCVD risk score (Michael DIETRICH, et al., 2019) is: 18.4%    Values used to calculate the score:      Age: 74 years      Sex: Female      Is Non- : No      Diabetic: No      Tobacco smoker: No      Systolic Blood Pressure: 138 mmHg      Is BP treated: No      HDL Cholesterol: 93 mg/dL      Total Cholesterol: 185 mg/dL  Recent Labs   Lab Test 04/26/24  1526 11/28/18  1457   CHOL 185 185.2   HDL 93 94.5   LDL 72 81   TRIG 98 50.4   CHOLHDLRATIO  --  2.0     Last Comprehensive Metabolic Panel:  Lab Results   Component Value Date     04/26/2024    POTASSIUM 3.8 04/26/2024    CHLORIDE 106 04/26/2024    CO2 28 04/26/2024    ANIONGAP 9 04/26/2024    GLC 90 04/26/2024    BUN 25.6 (H) 04/26/2024    CR 1.06 (H) 04/26/2024    GFRESTIMATED 55 (L) 04/26/2024    BILL 9.7 04/26/2024      The longitudinal plan of care for the diagnosis(es)/condition(s) as documented were addressed during this visit. Due to the added complexity in care, I will continue to support Carli in the subsequent management and with ongoing continuity of care.            Objective           Vitals:  No vitals were obtained today due to virtual visit.    Physical Exam   GENERAL: alert and no distress  EYES: Eyes grossly normal to inspection.  No discharge or erythema, or obvious scleral/conjunctival abnormalities.  RESP: No audible wheeze, cough, or visible cyanosis.    SKIN: Visible skin clear. No significant rash, abnormal pigmentation or lesions.  NEURO: Cranial nerves grossly intact.  Mentation and speech appropriate for age.  PSYCH: Appropriate affect, tone, and pace of words          Video-Visit Details    Type of service:  Video Visit   Video End Time: 820  Originating Location (pt. Location): Home    Distant Location (provider location):  On-site  Platform used for Video Visit: Alayna  Signed Electronically  by: Houston Menchaca MD

## 2024-06-26 ENCOUNTER — THERAPY VISIT (OUTPATIENT)
Dept: PHYSICAL THERAPY | Facility: CLINIC | Age: 74
End: 2024-06-26
Payer: MEDICARE

## 2024-06-26 DIAGNOSIS — M25.511 CHRONIC RIGHT SHOULDER PAIN: ICD-10-CM

## 2024-06-26 DIAGNOSIS — M79.18 MYOFASCIAL PAIN: ICD-10-CM

## 2024-06-26 DIAGNOSIS — G89.29 CHRONIC RIGHT SHOULDER PAIN: ICD-10-CM

## 2024-06-26 DIAGNOSIS — M54.2 NECK PAIN: Primary | ICD-10-CM

## 2024-06-26 PROCEDURE — 97110 THERAPEUTIC EXERCISES: CPT | Mod: GP | Performed by: PHYSICAL THERAPIST

## 2024-07-09 ENCOUNTER — THERAPY VISIT (OUTPATIENT)
Dept: OCCUPATIONAL THERAPY | Facility: CLINIC | Age: 74
End: 2024-07-09
Payer: MEDICARE

## 2024-07-09 ENCOUNTER — THERAPY VISIT (OUTPATIENT)
Dept: PHYSICAL THERAPY | Facility: CLINIC | Age: 74
End: 2024-07-09
Payer: MEDICARE

## 2024-07-09 DIAGNOSIS — G89.29 CHRONIC RIGHT SHOULDER PAIN: ICD-10-CM

## 2024-07-09 DIAGNOSIS — M54.2 NECK PAIN: Primary | ICD-10-CM

## 2024-07-09 DIAGNOSIS — M25.511 CHRONIC RIGHT SHOULDER PAIN: ICD-10-CM

## 2024-07-09 DIAGNOSIS — R20.2 NUMBNESS AND TINGLING OF RIGHT HAND: Primary | ICD-10-CM

## 2024-07-09 DIAGNOSIS — R20.0 NUMBNESS AND TINGLING OF RIGHT HAND: Primary | ICD-10-CM

## 2024-07-09 DIAGNOSIS — M79.18 MYOFASCIAL PAIN: ICD-10-CM

## 2024-07-09 PROCEDURE — 97110 THERAPEUTIC EXERCISES: CPT | Mod: GO | Performed by: OCCUPATIONAL THERAPIST

## 2024-07-09 PROCEDURE — 97112 NEUROMUSCULAR REEDUCATION: CPT | Mod: GO | Performed by: OCCUPATIONAL THERAPIST

## 2024-07-09 PROCEDURE — 97530 THERAPEUTIC ACTIVITIES: CPT | Mod: GP | Performed by: PHYSICAL THERAPIST

## 2024-07-09 PROCEDURE — 97110 THERAPEUTIC EXERCISES: CPT | Mod: GP | Performed by: PHYSICAL THERAPIST

## 2024-07-23 ENCOUNTER — THERAPY VISIT (OUTPATIENT)
Dept: PHYSICAL THERAPY | Facility: CLINIC | Age: 74
End: 2024-07-23
Payer: MEDICARE

## 2024-07-23 ENCOUNTER — THERAPY VISIT (OUTPATIENT)
Dept: OCCUPATIONAL THERAPY | Facility: CLINIC | Age: 74
End: 2024-07-23
Payer: MEDICARE

## 2024-07-23 DIAGNOSIS — M54.2 NECK PAIN: Primary | ICD-10-CM

## 2024-07-23 DIAGNOSIS — G89.29 CHRONIC RIGHT SHOULDER PAIN: ICD-10-CM

## 2024-07-23 DIAGNOSIS — R20.2 NUMBNESS AND TINGLING OF RIGHT HAND: Primary | ICD-10-CM

## 2024-07-23 DIAGNOSIS — R20.0 NUMBNESS AND TINGLING OF RIGHT HAND: Primary | ICD-10-CM

## 2024-07-23 DIAGNOSIS — M79.18 MYOFASCIAL PAIN: ICD-10-CM

## 2024-07-23 DIAGNOSIS — M25.511 CHRONIC RIGHT SHOULDER PAIN: ICD-10-CM

## 2024-07-23 PROCEDURE — 97110 THERAPEUTIC EXERCISES: CPT | Mod: GP | Performed by: PHYSICAL THERAPIST

## 2024-07-23 PROCEDURE — 97110 THERAPEUTIC EXERCISES: CPT | Mod: GO | Performed by: OCCUPATIONAL THERAPIST

## 2024-07-23 PROCEDURE — 97530 THERAPEUTIC ACTIVITIES: CPT | Mod: GP | Performed by: PHYSICAL THERAPIST

## 2024-07-23 PROCEDURE — 97112 NEUROMUSCULAR REEDUCATION: CPT | Mod: GO | Performed by: OCCUPATIONAL THERAPIST

## 2024-08-28 ENCOUNTER — HOSPITAL ENCOUNTER (OUTPATIENT)
Dept: MAMMOGRAPHY | Facility: CLINIC | Age: 74
Discharge: HOME | End: 2024-08-28
Attending: FAMILY MEDICINE | Admitting: FAMILY MEDICINE
Payer: MEDICARE

## 2024-08-28 DIAGNOSIS — Z12.31 VISIT FOR SCREENING MAMMOGRAM: ICD-10-CM

## 2024-08-28 PROCEDURE — 77063 BREAST TOMOSYNTHESIS BI: CPT

## 2024-10-08 ENCOUNTER — ANCILLARY PROCEDURE (OUTPATIENT)
Dept: MRI IMAGING | Facility: CLINIC | Age: 74
End: 2024-10-08
Attending: OTOLARYNGOLOGY
Payer: MEDICARE

## 2024-10-08 DIAGNOSIS — H90.5 CENTRAL HEARING LOSS: ICD-10-CM

## 2024-10-08 PROCEDURE — 255N000002 HC RX 255 OP 636: Performed by: OTOLARYNGOLOGY

## 2024-10-08 PROCEDURE — A9585 GADOBUTROL INJECTION: HCPCS | Performed by: OTOLARYNGOLOGY

## 2024-10-08 PROCEDURE — 70553 MRI BRAIN STEM W/O & W/DYE: CPT

## 2024-10-08 RX ORDER — GADOBUTROL 604.72 MG/ML
5.5 INJECTION INTRAVENOUS ONCE
Status: COMPLETED | OUTPATIENT
Start: 2024-10-08 | End: 2024-10-08

## 2024-10-08 RX ADMIN — GADOBUTROL 5.5 ML: 604.72 INJECTION INTRAVENOUS at 10:37

## 2024-10-24 NOTE — TELEPHONE ENCOUNTER
RN called patient about what is needed for her [facility] appointment. Patient stating that Alba Shabazz with [facility] needs the following labs in order to treat her after her Dexa Scan for a fractured hip she suffered on 10/31/2022 this year.     Labs needed : serum calcium, creatinine, thyroid, TSH, and phosphorus.       Anastacia Perdomo RN    
[facility] phone call message - order or referral request for patient:     Order or referral being requested: Blood labs from Alba Shabazz, [facility], was faxed in per patient.      Additional Comments:     OK to leave a message on voice mail? Yes    Primary language: English      needed? No    Call taken on December 12, 2022 at 12:07 PM by Neto Dunham      
English

## 2025-03-18 ENCOUNTER — TELEPHONE (OUTPATIENT)
Dept: FAMILY MEDICINE | Facility: CLINIC | Age: 75
End: 2025-03-18
Payer: MEDICARE

## 2025-03-18 DIAGNOSIS — Z87.311 PERSONAL HISTORY OF (HEALED) OTHER PATHOLOGICAL FRACTURE: Primary | ICD-10-CM

## 2025-03-18 DIAGNOSIS — M81.0 AGE-RELATED OSTEOPOROSIS WITHOUT CURRENT PATHOLOGICAL FRACTURE: ICD-10-CM

## 2025-03-18 NOTE — TELEPHONE ENCOUNTER
[facility] phone call message - order or referral request for patient:     Order or referral being requested: Referral      Additional Comments: The patient would like a referral for a bone density scan.    OK to leave a message on voice mail? Yes    Primary language: English      needed? No    Call taken on March 18, 2025 at 11:16 AM by Sophie De La Rosa

## 2025-03-19 ENCOUNTER — HOSPITAL ENCOUNTER (OUTPATIENT)
Dept: BONE DENSITY | Facility: CLINIC | Age: 75
Discharge: HOME | End: 2025-03-19
Attending: FAMILY MEDICINE
Payer: MEDICARE

## 2025-03-19 DIAGNOSIS — Z87.311 PERSONAL HISTORY OF (HEALED) OTHER PATHOLOGICAL FRACTURE: ICD-10-CM

## 2025-03-19 DIAGNOSIS — M81.0 AGE-RELATED OSTEOPOROSIS WITHOUT CURRENT PATHOLOGICAL FRACTURE: ICD-10-CM

## 2025-03-19 PROCEDURE — 77081 DXA BONE DENSITY APPENDICULR: CPT

## 2025-03-19 PROCEDURE — 77080 DXA BONE DENSITY AXIAL: CPT

## 2025-03-20 DIAGNOSIS — M81.0 AGE-RELATED OSTEOPOROSIS WITHOUT CURRENT PATHOLOGICAL FRACTURE: ICD-10-CM

## 2025-03-20 DIAGNOSIS — Z92.29 HISTORY OF BISPHOSPHONATE THERAPY: Primary | ICD-10-CM

## 2025-03-22 ENCOUNTER — HEALTH MAINTENANCE LETTER (OUTPATIENT)
Age: 75
End: 2025-03-22

## 2025-03-25 ENCOUNTER — E-CONSULT (OUTPATIENT)
Dept: ENDOCRINOLOGY | Facility: CLINIC | Age: 75
End: 2025-03-25
Payer: MEDICARE

## 2025-03-25 PROCEDURE — 99451 NTRPROF PH1/NTRNET/EHR 5/>: CPT

## 2025-03-26 NOTE — PROGRESS NOTES
3/25/2025     E-Consult has been accepted.    Interprofessional consultation requested by:  Houston Menchaca MD      Clinical Question/Purpose: MY CLINICAL QUESTION IS: This is a patient who has osteoporosis and a previous hip fracture. She has been on Alendronate (adherent) for 2 years repeat Dexa showed small, relatively insignificant improvement in her BMD.  Would you suggest switching to Prolia or other therapy?    Patient assessment and information reviewed:   Past Medical History:   Diagnosis Date    Atrophic vaginitis     Left elbow pain 11/07/2023     10/31/22 Right femoral neck fracture  3/19/25 DXA lowest T-score LEFT Hip Femoral neck: BMD: 0.684 g/cm2. T-score: -2.5. Z-score: -0.4.   Compared with 1/3/23 1.5% increase in bilateral hip BMD.     12/13/22 TSh 1.61 ,phos 3.4   4/26/24 Ca 9.7, creatinine 1.06     Per the MAR alendronate started 6/6/2024     Current Outpatient Medications   Medication Sig Dispense Refill    alendronate (FOSAMAX) 70 MG tablet TAKE 1 TABLET(70 MG) BY MOUTH EVERY 7 DAYS 12 tablet 3    atorvastatin (LIPITOR) 10 MG tablet Take 1 tablet (10 mg) by mouth daily 90 tablet 3    calcium carbonate 750 MG CHEW Take 1 chew tab by mouth daily       Multiple Vitamins-Minerals (MULTIVITAMIN WOMEN 50+ PO) Take 1 tablet by mouth daily      Multiple Vitamins-Minerals (PRESERVISION AREDS 2 PO) Take 1 capsule by mouth daily       Recommendations:   I would not consider the stability of BMD at the hip a treatment failure .  The drugs shown below might be considered for treatment, taking into account drug insert prescribing instructions and warnings, insurance limitations and patient preferences.    As you can see on the table, none of the drugs cause a big increase in bone density at the hip.  What is most important is the fracture risk reduction.  Alendronate is an acceptable treatment and I probably wouldn't change treatment now unless there was another reason to do so.        strength 2-yr    Inc   Spine   BMD 2-yr  Inc  Hip  BMD RRR of spine   fracture RRR  non-spine fracture dosing Mechanism   Requires follow up antiresorptive?  Major side effects limitations   teriparatide 1 8-10% 1.5-2% 65-70% 35% Once/day sc  for 2 years Builds bone yes Osteosarcoma risk; high calcium/kidney stones High PTH; Radiation history   abaloparatide 1 10% 2-3% 70-80% 40% Once/day sc for 2 years Builds bone yes Same as teriparatide   Romosozumab 1 11% (1 yr) 4% (1 yr) 48%  vs alendronate 20% vs   alendronate Sc once/month for 12 months; Builds bone; reduces bone loss yes ONJ, atypical fracture; cardiovascular; low calcium Low calcium; cardiac history   Denosumab 2 6-8% 3-4% 68% 20% Every 6 months exactly; Reduces bone loss yes ONJ, atypical fracture'; infection; low calcium Low calcium; precise timing is crtitical     Zoledronic acid 3 5-6% 3-4% 79% 25% Once/yr IV Reduces bone loss  Acute aches; low calcium Low calcium; kidney function   Oral bisphosphonate 4 3-5% 2-3% 40-53% 0-20% Weekly  Reduces bone loss   Kidney function   raloxifene 5 2-3% 1% 50% --  SERM  Thromboembolism; hot flashes thrombosis           The recommendations provided in this E-Consult are based on a review of clinical data pertinent to the clinical question presented, without a review of the patient's complete medical record or, the benefit of a comprehensive in-person or virtual patient evaluation. This consultation should not replace the clinical judgement and evaluation of the provider ordering this E-Consult. Any new clinical issues, or changes in patient status since the filing of this E-Consult will need to be taken into account when assessing these recommendations. Please contact me if you have further questions.    My total time spent reviewing clinical information and formulating assessment was 10 minutes.        Syl Alegria MD

## 2025-04-27 DIAGNOSIS — E78.2 MIXED HYPERLIPIDEMIA: ICD-10-CM

## 2025-04-28 ENCOUNTER — OFFICE VISIT (OUTPATIENT)
Dept: FAMILY MEDICINE | Facility: CLINIC | Age: 75
End: 2025-04-28
Payer: COMMERCIAL

## 2025-04-28 VITALS
WEIGHT: 116.2 LBS | BODY MASS INDEX: 24.39 KG/M2 | HEIGHT: 58 IN | SYSTOLIC BLOOD PRESSURE: 146 MMHG | HEART RATE: 62 BPM | TEMPERATURE: 98 F | OXYGEN SATURATION: 98 % | RESPIRATION RATE: 16 BRPM | DIASTOLIC BLOOD PRESSURE: 74 MMHG

## 2025-04-28 DIAGNOSIS — Z00.00 ENCOUNTER FOR MEDICARE ANNUAL WELLNESS EXAM: Primary | ICD-10-CM

## 2025-04-28 DIAGNOSIS — E78.2 MIXED HYPERLIPIDEMIA: ICD-10-CM

## 2025-04-28 DIAGNOSIS — N18.31 CHRONIC KIDNEY DISEASE, STAGE 3A (H): ICD-10-CM

## 2025-04-28 DIAGNOSIS — M80.80XA LOCALIZED OSTEOPOROSIS WITH CURRENT PATHOLOGICAL FRACTURE, INITIAL ENCOUNTER: ICD-10-CM

## 2025-04-28 PROBLEM — S72.001A CLOSED FRACTURE OF NECK OF RIGHT FEMUR, INITIAL ENCOUNTER (H): Status: RESOLVED | Noted: 2022-11-01 | Resolved: 2025-04-28

## 2025-04-28 LAB
ANION GAP SERPL CALCULATED.3IONS-SCNC: 11 MMOL/L (ref 7–15)
BUN SERPL-MCNC: 18.1 MG/DL (ref 8–23)
CALCIUM SERPL-MCNC: 9.6 MG/DL (ref 8.8–10.4)
CHLORIDE SERPL-SCNC: 105 MMOL/L (ref 98–107)
CHOLEST SERPL-MCNC: 158 MG/DL
CREAT SERPL-MCNC: 1.22 MG/DL (ref 0.51–0.95)
EGFRCR SERPLBLD CKD-EPI 2021: 46 ML/MIN/1.73M2
FASTING STATUS PATIENT QL REPORTED: NO
FASTING STATUS PATIENT QL REPORTED: NO
GLUCOSE SERPL-MCNC: 93 MG/DL (ref 70–99)
HCO3 SERPL-SCNC: 26 MMOL/L (ref 22–29)
HDLC SERPL-MCNC: 101 MG/DL
LDLC SERPL CALC-MCNC: 47 MG/DL
NONHDLC SERPL-MCNC: 57 MG/DL
POTASSIUM SERPL-SCNC: 4.1 MMOL/L (ref 3.4–5.3)
SODIUM SERPL-SCNC: 142 MMOL/L (ref 135–145)
TRIGL SERPL-MCNC: 51 MG/DL

## 2025-04-28 RX ORDER — ATORVASTATIN CALCIUM 10 MG/1
10 TABLET, FILM COATED ORAL DAILY
Qty: 90 TABLET | Refills: 3 | Status: SHIPPED | OUTPATIENT
Start: 2025-04-28

## 2025-04-28 RX ORDER — ALENDRONATE SODIUM 70 MG/1
70 TABLET ORAL
Qty: 12 TABLET | Refills: 3 | Status: SHIPPED | OUTPATIENT
Start: 2025-04-28

## 2025-04-28 SDOH — HEALTH STABILITY: PHYSICAL HEALTH: ON AVERAGE, HOW MANY DAYS PER WEEK DO YOU ENGAGE IN MODERATE TO STRENUOUS EXERCISE (LIKE A BRISK WALK)?: 5 DAYS

## 2025-04-28 SDOH — HEALTH STABILITY: PHYSICAL HEALTH: ON AVERAGE, HOW MANY MINUTES DO YOU ENGAGE IN EXERCISE AT THIS LEVEL?: 60 MIN

## 2025-04-28 ASSESSMENT — SOCIAL DETERMINANTS OF HEALTH (SDOH): HOW OFTEN DO YOU GET TOGETHER WITH FRIENDS OR RELATIVES?: ONCE A WEEK

## 2025-04-28 NOTE — PATIENT INSTRUCTIONS
Patient Education     Patient Education   Carli was seen today for physical.    Diagnoses and all orders for this visit:    Encounter for Medicare annual wellness exam    Mixed hyperlipidemia  -     Lipid panel reflex to direct LDL Non-fasting  -     atorvastatin (LIPITOR) 10 MG tablet; Take 1 tablet (10 mg) by mouth daily.    Localized osteoporosis with current pathological fracture, initial encounter  -     alendronate (FOSAMAX) 70 MG tablet; Take 1 tablet (70 mg) by mouth every 7 days.    Chronic kidney disease, stage 3a (H)  -     BASIC METABOLIC PANEL         Based on our discussion, I have outlined the following instructions for you:      - Make sure to get more Lipitor from the pharmacy.  - Keep taking Fosamax as you have been. You might be able to take a break from it after 5 years.  - Go for the lab tests to check how your kidneys are working and to see your cholesterol levels.  - Try to exercise regularly, as much as you can.  - Your mammogram is current, so no need to worry about that right now.  - Remember, you have an eye doctor appointment next week.    Thank you again for your visit, and we look forward to supporting you in your journey to better health.         Please call or return to clinic if your symptoms don't go away.    Follow up plan  Return in about 1 year (around 4/28/2026) for CPE/Wellness Visit.    Thank you for coming to [facility] today.  Lab Testing:  **If you had lab testing today and your results are reassuring or normal they will be mailed to you or sent through [employer] within 7 days.   **If the lab tests need quick action we will call you with the results.  **If you are having labs done on a different day, please call [phone] to schedule at [facility] or [phone] for other [facility] locations. Labs do not offer walk-in appointments.  The phone number we will call with results is # [phone] (home) [phone] (work). If this is not the  best number please call our clinic and change the number.  Medication Refills:  If you need any refills please call your pharmacy and they will contact us.   If you need to  your refill at a new pharmacy, please contact the new pharmacy directly. The new pharmacy will help you get your medications transferred faster.   Scheduling:  If you have any concerns about today's visit or wish to schedule another appointment please call our office during normal business hours [phone] (8-5:00 M-F). If you can no longer make a scheduled visit, please cancel via [employer] or call us to cancel.   If a referral was made to an [facility] specialty provider and you do not get a call from central scheduling, please refer to directions on your visit summary or call our office during normal business hours for assistance.   If a Mammogram was ordered for you at the [facility] call [phone] to schedule or change your appointment.  If you had an XRay/CT/Ultrasound/MRI ordered the number is [phone] to schedule or change your radiology appointment.   [facility] has limited ultrasound appointments available on Wednesdays, if you would like your ultrasound at [facility], please call [phone] to schedule.   Medical Concerns:  If you have urgent medical concerns please call [phone] at any time of the day.    Houston Menchaca MD      Preventive Care Advice   This is general advice given by our system to help you stay healthy. However, your care team may have specific advice just for you. Please talk to your care team about your preventive care needs.  Nutrition  Eat 5 or more servings of fruits and vegetables each day.  Try wheat bread, brown rice and whole grain pasta (instead of white bread, rice, and pasta).  Get enough calcium and vitamin D. Check the label on foods and aim for 100% of the RDA (recommended daily allowance).  Lifestyle  Exercise at least 150 minutes each week  (30 minutes a day, 5  days a week).  Do muscle strengthening activities 2 days a week. These help control your weight and prevent disease.  No smoking.  Wear sunscreen to prevent skin cancer.  Have a dental exam and cleaning every 6 months.  Yearly exams  See your health care team every year to talk about:  Any changes in your health.  Any medicines your care team has prescribed.  Preventive care, family planning, and ways to prevent chronic diseases.  Shots (vaccines)   HPV shots (up to age 26), if you've never had them before.  Hepatitis B shots (up to age 59), if you've never had them before.  COVID-19 shot: Get this shot when it's due.  Flu shot: Get a flu shot every year.  Tetanus shot: Get a tetanus shot every 10 years.  Pneumococcal, hepatitis A, and RSV shots: Ask your care team if you need these based on your risk.  Shingles shot (for age 50 and up)  General health tests  Diabetes screening:  Starting at age 35, Get screened for diabetes at least every 3 years.  If you are younger than age 35, ask your care team if you should be screened for diabetes.  Cholesterol test: At age 39, start having a cholesterol test every 5 years, or more often if advised.  Bone density scan (DEXA): At age 50, ask your care team if you should have this scan for osteoporosis (brittle bones).  Hepatitis C: Get tested at least once in your life.  STIs (sexually transmitted infections)  Before age 24: Ask your care team if you should be screened for STIs.  After age 24: Get screened for STIs if you're at risk. You are at risk for STIs (including HIV) if:  You are sexually active with more than one person.  You don't use condoms every time.  You or a partner was diagnosed with a sexually transmitted infection.  If you are at risk for HIV, ask about PrEP medicine to prevent HIV.  Get tested for HIV at least once in your life, whether you are at risk for HIV or not.  Cancer screening tests  Cervical cancer screening: If you have a cervix, begin getting  regular cervical cancer screening tests starting at age 21.  Breast cancer scan (mammogram): If you've ever had breasts, begin having regular mammograms starting at age 40. This is a scan to check for breast cancer.  Colon cancer screening: It is important to start screening for colon cancer at age 45.  Have a colonoscopy test every 10 years (or more often if you're at risk) Or, ask your provider about stool tests like a FIT test every year or Cologuard test every 3 years.  To learn more about your testing options, visit:   .  For help making a decision, visit:   https://bit.ly/ko22099.  Prostate cancer screening test: If you have a prostate, ask your care team if a prostate cancer screening test (PSA) at age 55 is right for you.  Lung cancer screening: If you are a current or former smoker ages 50 to 80, ask your care team if ongoing lung cancer screenings are right for you.  For informational purposes only. Not to replace the advice of your health care provider. Copyright   2023 [employer]. All rights reserved. Clinically reviewed by the [facility]. "[employer] 394936 - REV 01/24.  Hearing Loss: Care Instructions  Overview     Hearing loss is a sudden or slow decrease in how well you hear. It can range from slight to profound. Permanent hearing loss can occur with aging. It also can happen when you are exposed long-term to loud noise. Examples include listening to loud music, riding motorcycles, or being around other loud machines.  Hearing loss can affect your work and home life. It can make you feel lonely or depressed. You may feel that you have lost your independence. But hearing aids and other devices can help you hear better and feel connected to others.  Follow-up care is a key part of your treatment and safety. Be sure to make and go to all appointments, and call your doctor if you are having problems. It's also a good idea to know your test results and keep a list  of the medicines you take.  How can you care for yourself at home?  Avoid loud noises whenever possible. This helps keep your hearing from getting worse.  Always wear hearing protection around loud noises.  Wear a hearing aid as directed.  A professional can help you pick a hearing aid that will work best for you.  You can also get hearing aids over the counter for mild to moderate hearing loss.  Have hearing tests as your doctor suggests. They can show whether your hearing has changed. Your hearing aid may need to be adjusted.  Use other devices as needed. These may include:  Telephone amplifiers and hearing aids that can connect to a television, stereo, radio, or microphone.  Devices that use lights or vibrations. These alert you to the doorbell, a ringing telephone, or a baby monitor.  Television closed-captioning. This shows the words at the bottom of the screen. Most new TVs can do this.  TTY (text telephone). This lets you type messages back and forth on the telephone instead of talking or listening. These devices are also called TDD. When messages are typed on the keyboard, they are sent over the phone line to a receiving TTY. The message is shown on a monitor.  Use text messaging, social media, and email if it is hard for you to communicate by telephone.  Try to learn a listening technique called speechreading. It is not lipreading. You pay attention to people's gestures, expressions, posture, and tone of voice. These clues can help you understand what a person is saying. Face the person you are talking to, and have them face you. Make sure the lighting is good. You need to see the other person's face clearly.  Think about counseling if you need help to adjust to your hearing loss.  When should you call for help?  Watch closely for changes in your health, and be sure to contact your doctor if:    You think your hearing is getting worse.     You have new symptoms, such as dizziness or nausea.   Where can you  "learn more?  Go to https://www.[employer].net/patiented  Enter R798 in the search box to learn more about \"Hearing Loss: Care Instructions.\"  Current as of: October 27, 2024  Content Version: 14.4    9456-8725 [employer].   Care instructions adapted under license by your healthcare professional. If you have questions about a medical condition or this instruction, always ask your healthcare professional. [employer] disclaims any warranty or liability for your use of this information.    9 Ways to Cut Back on Drinking  Maybe you've found yourself drinking more alcohol than you'd prefer. If you want to cut back, here are some ideas to try.    Think before you drink.  Do you really want a drink, or is it just a habit? If you're used to having a drink at a certain time, try doing something else then.     Look for substitutes.  Find some no-alcohol drinks that you enjoy, like flavored seltzer water, tea with honey, or tonic with a slice of lime. Or try alcohol-free beer or \"virgin\" cocktails (without the alcohol).     Drink more water.  Use water to quench your thirst. Drink a glass of water before you have any alcohol. Have another glass along with every drink or between drinks.     Shrink your drink.  For example, have a bottle of beer instead of a pint. Use a smaller glass for wine. Choose drinks with lower alcohol content (ABV%). Or use less liquor and more mixer in cocktails.     Slow down.  It's easy to drink quickly and without thinking about it. Pay attention, and make each drink last longer.     Do the math.  Total up how much you spend on alcohol each month. How much is that a year? If you cut back, what could you do with the money you save?     Take a break.  Choose a day or two each week when you won't drink at all. Notice how you feel on those days, physically and emotionally. How did you sleep? Do you feel better? Over time, add more break days.     Count calories.  Would you like to " "lose some weight? For some people that's a good motivator for cutting back. Figure out how many calories are in each drink. How many does that add up to in a day? In a week? In a month?     Practice saying no.  Be ready when someone offers you a drink. Try: \"Thanks, I've had enough.\" Or \"Thanks, but I'm cutting back.\" Or \"No, thanks. I feel better when I drink less.\"   Current as of: August 20, 2024  Content Version: 14.4    4337-1088 [employer].   Care instructions adapted under license by your healthcare professional. If you have questions about a medical condition or this instruction, always ask your healthcare professional. [employer] disclaims any warranty or liability for your use of this information.     "

## 2025-04-29 DIAGNOSIS — N18.31 CHRONIC KIDNEY DISEASE, STAGE 3A (H): Primary | ICD-10-CM

## 2025-04-29 LAB
CYSTATIN C (ROCHE): 1.1 MG/L (ref 0.6–1)
GFR/BSA.PRED SERPLBLD CYS-BASED-ARV: 60 ML/MIN/1.73M2

## 2025-05-20 ENCOUNTER — VIRTUAL VISIT (OUTPATIENT)
Dept: FAMILY MEDICINE | Facility: CLINIC | Age: 75
End: 2025-05-20
Payer: MEDICARE

## 2025-05-20 ENCOUNTER — NURSE TRIAGE (OUTPATIENT)
Dept: FAMILY MEDICINE | Facility: CLINIC | Age: 75
End: 2025-05-20
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
## 2025-05-20 PROCEDURE — 98005 SYNCH AUDIO-VIDEO EST LOW 20: CPT | Performed by: FAMILY MEDICINE

## 2025-05-20 NOTE — TELEPHONE ENCOUNTER
"1. APPEARANCE \"What does it look like?\"  (e.g., abrasion, bruise, puncture)       Puncture, swollen, red, tender. Was bleeding and now stopped.   2. SIZE: \"How big is the bite?\" (e.g., inches, cm; or compare to size of coin, pea, grape, ping pong ball)       Puncture wound. Several mm.   3. LOCATION: \"Where is the bite located?\"       Right hand palm and top of hand.   4. ONSET: \"When did the bite happen?\" (e.g., minutes, hours ago)       12:30 pm.   5. ANIMAL: \"What type of animal caused the bite?\" \"Is the injury from a bite or a claw?\" If the animal is a dog or a cat, ask: \"Was it a pet or a stray?\" \"Was it acting ill or behaving strangely?\"      Cat bite, cat from [employer] adoption floor. Not acting ill or strangely.   6. RABIES VACCINE: For dog or cat bites, ask: \"Do you know if the pet is vaccinated against rabies?\"  (e.g., yes, no, overdue for rabies shot, unknown)      Patient assumes all vaccines were given. She is very confident this was done, but she will call to check.   7. CIRCUMSTANCES: \"Tell me how this happened.\"       Patient was trying to  a cat.   8. TETANUS: \"When was your last tetanus booster?\"      Last given 5/11/23.     Virtual visit scheduled per protocol.     Reason for Disposition   Puncture wound (hole through the skin) from cat (teeth or claws)    Additional Information   Negative: Major bleeding (actively dripping or spurting) that can't be stopped   Negative: Sounds like a life-threatening emergency to the triager   Negative: Human bite   Negative: Any break in skin from BITE (e.g., cut, puncture, or scratch) and WILD animal at risk for RABIES (e.g., bat, raccoon, gama, skunk, coyote, other carnivores; see Background for list)   Negative: Any break in skin from BITE (e.g., cut, puncture, or scratch) and PET animal (e.g., dog, cat, or ferret) at risk for RABIES (e.g., sick, stray, unprovoked bite, developing country)   Negative: Any break in skin from " BITE (e.g., cut, puncture, or scratch) and monkey   Negative: Cut (length > 1/8 inch or 3 mm) or skin tear and any animal  (Exception: Superficial scratch that doesn't go through dermis.)   Negative: Bleeding won't stop after 10 minutes of direct pressure (using correct technique)   Negative: EXPOSURE of non-intact skin (e.g., exposed person has dermatitis, abrasion, wound) with animal BODY FLUID (e.g., licking, blood, brain, saliva) and animal at high-risk for RABIES (e.g., bat, raccoon, gama, skunk, coyote, other carnivores)   Negative: Sounds like a serious bite injury to the triager   Negative: Puncture wound or small cut on face  (Exception: Puncture from small pet, such as a gerbil, mouse, hamster, puppy; or shallow scratches.)   Negative: Puncture wound or small cut on hands or genitals  (Exception: Puncture from small pet, such as a gerbil, mouse, hamster, puppy; or shallow scratches.)   Negative: Puncture wound and weak immune system (e.g., HIV positive, cancer chemo, splenectomy, organ transplant, chronic steroids)   Negative: Looks infected (spreading redness, red streak, or pus)    Protocols used: Animal Bite-A-OH

## 2025-05-20 NOTE — PROGRESS NOTES
Carli is a 74 year old who is being evaluated via a billable video visit.    How would you like to obtain your AVS? MyChart  If the video visit is dropped, the invitation should be resent by: Text to cell phone: [phone]  Will anyone else be joining your video visit? No      A/P:      ICD-10-CM    1. Cat bite, initial encounter  W55.01XA amoxicillin-clavulanate (AUGMENTIN) 875-125 MG tablet        Reviewed importance of beginning antibiotic right away.  Reviewed signs and symptoms that should prompt reeval/emergent care.    Discussed that redness/puffiness should not be worsening after 24 hours of antibiotics.  She should be noticing improvement after 48 hours of antibiotics and symptoms should be completely resolved 1-2 days before completion of antibiotic course.    If wounds are not following this course she plans to seek care (will be out of town).  She will reach out for extension and antibiotic if symptoms not resolved after 5-6 days of antibiotic.    All questions answered      Subjective   Carli is a 74 year old, presenting for the following health issues:  Cat Bite        HPI      Cat bite on right hand   Initial wound happened at the [place] around 12:30 today.  Was caring for a cat when it bit her in the R hand.  2 punctures noted along the 5th metacarpal.  Washed the wound well immediately after the bite.    Pt is UTD on tetanus vaccine and cat had been vaccinated in mid April for rabies.    Initially had not pain from the wound but now has been tender and puffy since about 3 PM when she got home.      Painful to flex the fingers of her R hand.           Objective           Vitals:  No vitals were obtained today due to virtual visit.    Physical Exam   GENERAL: alert and no distress  EYES: Eyes grossly normal to inspection.  No discharge or erythema, or obvious scleral/conjunctival abnormalities.  RESP: No audible wheeze, cough, or visible cyanosis.    SKIN: view of R hand reveals 1 puncture  each on the dorsal and palmar aspects of the medial hand near the 5th metacarpal.  Mild surround erythema and swelling noted.  Punctures are distal from joints.  NEURO: Cranial nerves grossly intact.  Mentation and speech appropriate for age.  PSYCH: Appropriate affect, tone, and pace of words    Epic reviewed      Video-Visit Details    Type of service:  Video Visit   Video End Time:  Originating Location (pt. Location): Home    Distant Location (provider location):  On-site  Platform used for Video Visit: Alayna  Signed Electronically by: Nivia Helm DO

## 2025-05-27 ENCOUNTER — TELEPHONE (OUTPATIENT)
Dept: FAMILY MEDICINE | Facility: CLINIC | Age: 75
End: 2025-05-27
Payer: MEDICARE
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
## 2025-05-27 NOTE — TELEPHONE ENCOUNTER
Spoke with patient who reports that they continue to have redness, warmth, and swelling in her hand from a cat bite. The antibiotics she was given has helped but it continues to be a problem. She is unable to close her hand, has no  strength and cannot even turn a doorknob.     [place] of [place] and will not be back until Thursday. She is wondering if she could have more antibiotics or if she needs to be seen at an urgent care in [place]. Patient also requesting an appointment to be seen here in clinic on Friday. Patient scheduled for an appointment. She will cancel if the plans change based on what provider says.     Plan to check with preceptor and call patient back with final plan.   Emerald English RN

## 2025-05-27 NOTE — TELEPHONE ENCOUNTER
Pending Prescriptions:                       Disp   Refills    amoxicillin-clavulanate (AUGMENTIN) 875-1*14 tab*0            Sig: Take 1 tablet by mouth 2 times daily.    Pt wants to continue antibiotic.  Says she feels she needs to continue it for it to completely heal.  Call to [facility] in [facility]    Jnais Carranza on 5/27/2025 at 12:28 PM

## 2025-05-27 NOTE — TELEPHONE ENCOUNTER
Please call pt, if she feels the hand is not significantly improved at this point then additional evaluation in person would be recommended.  I would recommend she be seen in person in UC or ED today    Dr. Nivia Helm, DO

## 2025-05-27 NOTE — TELEPHONE ENCOUNTER
Call placed to Patient.  Relayed Dr Helm's message below.  Patient states that hand and fingers are significantly better.  Hand is still puffy, sore and red.  Is sensitive to pressure to top of hand.  Patient scheduled appointment with PCP for Friday as feels that there could be something more going on with her hand.  Recommended that Patient go to UC as advised by Dr Helm.  Verbalized understanding.  Lloyd BLAIR, Clinic RN   [facility]

## 2025-05-27 NOTE — TELEPHONE ENCOUNTER
Called patient back and informed to call the clinic number for the provider she saw on 5/20 as it looks like they may be willing to extend the antibiotic. Told her the other option is to go to the ED that is near her as there are no urgent cares. Patient was not happy with this answer but understands. She has the number to the other clinic and will call them. She said she will keep her Friday appointment here regardless.     Emerald English RN

## 2025-05-27 NOTE — TELEPHONE ENCOUNTER
[facility] phone call message- general phone call:    Reason for call: The patient recently experienced an animal bite on hand; got treatment (5/20/25), and now states the hand is still really sore and swollen.    The patient would like a call back to discuss next steps.    Return call needed: Yes    OK to leave a message on voice mail? Yes    Primary language: English      needed? No    Call taken on May 27, 2025 at 9:01 AM by Sophie De La Rosa    no

## 2025-05-28 ENCOUNTER — ANCILLARY PROCEDURE (OUTPATIENT)
Dept: GENERAL RADIOLOGY | Facility: CLINIC | Age: 75
End: 2025-05-28
Attending: NURSE PRACTITIONER
Payer: COMMERCIAL

## 2025-05-28 ENCOUNTER — OFFICE VISIT (OUTPATIENT)
Dept: URGENT CARE | Facility: URGENT CARE | Age: 75
End: 2025-05-28
Payer: COMMERCIAL

## 2025-05-28 VITALS
SYSTOLIC BLOOD PRESSURE: 158 MMHG | WEIGHT: 115.4 LBS | DIASTOLIC BLOOD PRESSURE: 73 MMHG | BODY MASS INDEX: 24.22 KG/M2 | RESPIRATION RATE: 15 BRPM | HEIGHT: 58 IN | OXYGEN SATURATION: 100 % | TEMPERATURE: 98.8 F | HEART RATE: 56 BPM

## 2025-05-28 DIAGNOSIS — M79.641 PAIN OF RIGHT HAND: ICD-10-CM

## 2025-05-28 DIAGNOSIS — M79.644 PAIN OF FINGER OF RIGHT HAND: ICD-10-CM
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
## 2025-05-28 DIAGNOSIS — S61.451S CAT BITE OF HAND, RIGHT, SEQUELA: ICD-10-CM

## 2025-05-28 DIAGNOSIS — L08.9 CAT BITE OF RIGHT HAND WITH INFECTION, SUBSEQUENT ENCOUNTER: Primary | ICD-10-CM

## 2025-05-28 DIAGNOSIS — S61.451D CAT BITE OF RIGHT HAND WITH INFECTION, SUBSEQUENT ENCOUNTER: Primary | ICD-10-CM
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
## 2025-05-28 PROCEDURE — 73130 X-RAY EXAM OF HAND: CPT | Mod: TC | Performed by: RADIOLOGY

## 2025-05-28 PROCEDURE — 99214 OFFICE O/P EST MOD 30 MIN: CPT | Mod: 25 | Performed by: NURSE PRACTITIONER

## 2025-05-28 PROCEDURE — 96372 THER/PROPH/DIAG INJ SC/IM: CPT | Performed by: NURSE PRACTITIONER

## 2025-05-28 PROCEDURE — 3077F SYST BP >= 140 MM HG: CPT | Performed by: NURSE PRACTITIONER

## 2025-05-28 PROCEDURE — 3078F DIAST BP <80 MM HG: CPT | Performed by: NURSE PRACTITIONER

## 2025-05-28 RX ORDER — CEFTRIAXONE SODIUM 1 G
1 VIAL (EA) INJECTION ONCE
Status: COMPLETED | OUTPATIENT
Start: 2025-05-28 | End: 2025-05-28

## 2025-05-28 RX ORDER — CEFUROXIME AXETIL 500 MG/1
500 TABLET ORAL 2 TIMES DAILY
Qty: 14 TABLET | Refills: 0 | Status: SHIPPED | OUTPATIENT
Start: 2025-05-28 | End: 2025-06-04

## 2025-05-28 RX ORDER — METRONIDAZOLE 500 MG/1
500 TABLET ORAL 3 TIMES DAILY
Qty: 21 TABLET | Refills: 0 | Status: SHIPPED | OUTPATIENT
Start: 2025-05-28 | End: 2025-06-04

## 2025-05-28 RX ADMIN — Medication 1 G: at 17:38

## 2025-05-28 NOTE — PROGRESS NOTES
Chief Complaint   Patient presents with    Urgent Care    Cat Bite     Virtual visit on 5/20/25 treated for cat bite Augmentin 875-125 MG   [facility] on 5/27/25 -  prescribed another 3 days course of Augmentin   Pt report cat bite on right hand. Pt visiting in [employer]   Pt reports swollen right hand, sore, hot and tender to touch and not getting better - concerned about if bite hit a nerve or tendon   Cat's vaccines is up to date   Pt Tdap is 5/11/2023           ICD-10-CM    1. Pain of finger of right hand  M79.644       2. Pain of right hand  M79.641 XR Hand Right G/E 3 Views      3. Cat bite of hand, right, sequela  S61.451S XR Hand Right G/E 3 Views    W55.01XS cefTRIAXone (ROCEPHIN) in lidocaine 1% for IM administration 1 g      4. Cat bite of right hand with infection, subsequent encounter  S61.451D metroNIDAZOLE (FLAGYL) 500 MG tablet    L08.9 cefuroxime (CEFTIN) 500 MG tablet    W55.01XD       After 10 days of Augmentin would expect the infection to be completely cleared but it appears to still be present although at a much lower degree.  Ceftriaxone injection was given and there was no allergic reaction after 20 minutes.  She will start Ceftin tomorrow and start metronidazole tonight.  If symptoms not fully resolved would follow-up with primary care or orthopedics.  If symptoms worsen she will go to the emergency room.     Reviewed potential adverse reactions to medications.    Xray - Reviewed and interpreted by me.  Right hand x-ray shows no acute fractures or dislocations, no evidence of osteomyelitis, minor arthritis is noted.    Subjective     Carli Michelle is an 74 year old female who presents to clinic today for pain redness and swelling in the right hand.  She was bit by a cat on 5/20/2025 while volunteering at the [employer].  She did a virtual visit and was given 7 days of Augmentin.  After 7 days the redness and swelling was still present so she had another  "visit and was given 3 more days of Augmentin.  After 2 days of this prescription she has not seen significant improvement.  She denies fever.      Objective    BP (!) 158/73   Pulse 56   Temp 98.8  F (37.1  C) (Tympanic)   Resp 15   Ht 1.473 m (4' 10\")   Wt 52.3 kg (115 lb 6.4 oz)   SpO2 100%   Breastfeeding No   BMI 24.12 kg/m    Nurses notes and VS have been reviewed.    Physical Exam   [facility]    GENERAL APPEARANCE: healthy appearing, alert     MS: extremities normal- no gross deformities noted; normal muscle tone.  Full range of motion and strength of right hand     SKIN: Slight erythema of right back of hand with minor swelling     NEURO: Normal strength and tone, mentation intact and speech normal, normal sensation of right hand     PSYCH: normal thought process; no significant mood disturbance    CATHLEEN Murphy, CNP  [facility]    The use of [employer] dictation services may have been used to construct the content in this note; any grammatical or spelling errors are non-intentional. Please contact the author of this note directly if you are in need of any clarification.     "

## 2025-05-28 NOTE — PROGRESS NOTES
Urgent Care Clinic Visit     Chief Complaint   Patient presents with    Urgent Care    Cat Bite     Virtual visit on 5/20/25 treated for cat bite Augmentin 875-125 MG   Pt report cat bite on right hand. Pt visiting in [employer]   Pt reports swollen right hand, sore, hot and tender to touch and not getting better - concerned about if bite hit a nerve or tendon   Cat's vaccines is up to date   Pt Tdap is 5/11/2023 5/28/2025     4:16 PM   Additional Questions   Roomed by Rachael   Accompanied by self

## 2025-05-28 NOTE — PATIENT INSTRUCTIONS
Start metronidazole tonight.    Start cefuroxime tomorrow morning.    Take these medications until completely gone.  If symptoms have not completely resolved by the end of this course suggest you follow-up with orthopedics.    If redness, swelling or pain worsens please go to the emergency room.

## 2025-06-06 ENCOUNTER — TRANSFERRED RECORDS (OUTPATIENT)
Dept: HEALTH INFORMATION MANAGEMENT | Facility: CLINIC | Age: 75
End: 2025-06-06
Payer: MEDICARE

## 2025-06-12 ENCOUNTER — DOCUMENTATION ONLY (OUTPATIENT)
Dept: FAMILY MEDICINE | Facility: CLINIC | Age: 75
End: 2025-06-12
Payer: MEDICARE

## 2025-06-12 NOTE — PROGRESS NOTES
"When opening a documentation only encounter, be sure to enter in \"Chief Complaint\" Forms and in \" Comments\" Title of form, description if needed.    Carli is a 74 year old  female  Form received via: Fax  Form now resides in: Provider Ready    [facility] orthopedics - Physician Orders    Mazin Sharif MA              "

## 2025-06-16 DIAGNOSIS — N18.31 CHRONIC KIDNEY DISEASE, STAGE 3A (H): ICD-10-CM

## 2025-06-18 ENCOUNTER — LAB (OUTPATIENT)
Dept: LAB | Facility: CLINIC | Age: 75
End: 2025-06-18
Payer: MEDICARE

## 2025-06-18 DIAGNOSIS — N18.31 CHRONIC KIDNEY DISEASE, STAGE 3A (H): ICD-10-CM

## 2025-06-18 LAB
ALBUMIN SERPL BCG-MCNC: 4.3 G/DL (ref 3.5–5.2)
ANION GAP SERPL CALCULATED.3IONS-SCNC: 12 MMOL/L (ref 7–15)
BUN SERPL-MCNC: 18.6 MG/DL (ref 8–23)
CALCIUM SERPL-MCNC: 9.7 MG/DL (ref 8.8–10.4)
CHLORIDE SERPL-SCNC: 106 MMOL/L (ref 98–107)
CREAT SERPL-MCNC: 1.08 MG/DL (ref 0.51–0.95)
EGFRCR SERPLBLD CKD-EPI 2021: 53 ML/MIN/1.73M2
GLUCOSE SERPL-MCNC: 92 MG/DL (ref 70–99)
HCO3 SERPL-SCNC: 25 MMOL/L (ref 22–29)
HGB BLD-MCNC: 14.3 G/DL (ref 11.7–15.7)
MCV RBC AUTO: 95 FL (ref 78–100)
PHOSPHATE SERPL-MCNC: 2.9 MG/DL (ref 2.5–4.5)
POTASSIUM SERPL-SCNC: 3.9 MMOL/L (ref 3.4–5.3)
SODIUM SERPL-SCNC: 143 MMOL/L (ref 135–145)

## 2025-06-19 LAB
CREAT UR-MCNC: 74.4 MG/DL
MICROALBUMIN UR-MCNC: <12 MG/L
MICROALBUMIN/CREAT UR: NORMAL MG/G{CREAT}

## 2025-06-23 ENCOUNTER — RESULTS FOLLOW-UP (OUTPATIENT)
Dept: FAMILY MEDICINE | Facility: CLINIC | Age: 75
End: 2025-06-23

## 2025-09-03 ENCOUNTER — HOSPITAL ENCOUNTER (OUTPATIENT)
Dept: MAMMOGRAPHY | Facility: CLINIC | Age: 75
Discharge: HOME | End: 2025-09-03
Attending: FAMILY MEDICINE
Payer: MEDICARE

## 2025-09-03 DIAGNOSIS — Z12.31 SCREENING MAMMOGRAM, ENCOUNTER FOR: ICD-10-CM

## 2025-09-03 PROCEDURE — 77067 SCR MAMMO BI INCL CAD: CPT

## (undated) DEVICE — SU VICRYL 0 CT-1 27" J340H

## (undated) DEVICE — DRSG ABDOMINAL 07 1/2X8" 7197D

## (undated) DEVICE — ESU GROUND PAD ADULT W/CORD E7507

## (undated) DEVICE — BASIN SET MAJOR

## (undated) DEVICE — SOL NACL 0.9% IRRIG 1000ML BOTTLE 2F7124

## (undated) DEVICE — LINEN TOWEL PACK X5 5464

## (undated) DEVICE — PREP CHLORAPREP 26ML TINTED HI-LITE ORANGE 930815

## (undated) DEVICE — SU MONOCRYL 3-0 PS-2 27" Y427H

## (undated) DEVICE — Device

## (undated) DEVICE — DRILL BIT QUICK COUPLING CAN 5.0X300MM 310.63

## (undated) DEVICE — DRSG XEROFORM 5X9" 8884431605

## (undated) DEVICE — WIRE GUIDE SYN 2.8X450MM THRD 900.726

## (undated) DEVICE — PACK HIP NAILING SOP15HNSB

## (undated) DEVICE — SPONGE LAP 18X18" X8435

## (undated) DEVICE — SOL WATER IRRIG 1000ML BOTTLE 2F7114

## (undated) DEVICE — BLADE CLIPPER 4406

## (undated) DEVICE — CAST PADDING 4" UNSTERILE 9044

## (undated) RX ORDER — CEFAZOLIN SODIUM/WATER 2 G/20 ML
SYRINGE (ML) INTRAVENOUS
Status: DISPENSED
Start: 2022-11-01

## (undated) RX ORDER — TRANEXAMIC ACID 10 MG/ML
INJECTION, SOLUTION INTRAVENOUS
Status: DISPENSED
Start: 2022-11-01

## (undated) RX ORDER — GLYCOPYRROLATE 0.2 MG/ML
INJECTION, SOLUTION INTRAMUSCULAR; INTRAVENOUS
Status: DISPENSED
Start: 2022-11-01

## (undated) RX ORDER — ONDANSETRON 2 MG/ML
INJECTION INTRAMUSCULAR; INTRAVENOUS
Status: DISPENSED
Start: 2022-11-01

## (undated) RX ORDER — DEXAMETHASONE SODIUM PHOSPHATE 4 MG/ML
INJECTION, SOLUTION INTRA-ARTICULAR; INTRALESIONAL; INTRAMUSCULAR; INTRAVENOUS; SOFT TISSUE
Status: DISPENSED
Start: 2022-11-01

## (undated) RX ORDER — LIDOCAINE HYDROCHLORIDE 20 MG/ML
INJECTION, SOLUTION EPIDURAL; INFILTRATION; INTRACAUDAL; PERINEURAL
Status: DISPENSED
Start: 2022-11-01

## (undated) RX ORDER — FENTANYL CITRATE 50 UG/ML
INJECTION, SOLUTION INTRAMUSCULAR; INTRAVENOUS
Status: DISPENSED
Start: 2022-11-01

## (undated) RX ORDER — FENTANYL CITRATE 0.05 MG/ML
INJECTION, SOLUTION INTRAMUSCULAR; INTRAVENOUS
Status: DISPENSED
Start: 2022-11-01